# Patient Record
Sex: FEMALE | Race: WHITE | Employment: OTHER | URBAN - NONMETROPOLITAN AREA
[De-identification: names, ages, dates, MRNs, and addresses within clinical notes are randomized per-mention and may not be internally consistent; named-entity substitution may affect disease eponyms.]

---

## 2000-01-01 LAB
BDY SITE: NORMAL
COLLECTED BY:: NORMAL
SAO2 % BLD: 96 % (ref 94–97)

## 2019-09-04 ENCOUNTER — HOSPITAL ENCOUNTER (OUTPATIENT)
Dept: CT IMAGING | Age: 64
Discharge: HOME | End: 2019-09-04

## 2019-09-04 DIAGNOSIS — Z00.6 ENCOUNTER FOR EXAMINATION FOR NORMAL COMPARISON AND CONTROL IN CLINICAL RESEARCH PROGRAM: ICD-10-CM

## 2019-09-04 PROCEDURE — 3209999900 CT COMPARISON OF OUTSIDE FILMS

## 2019-09-09 ENCOUNTER — OFFICE VISIT (OUTPATIENT)
Dept: ONCOLOGY | Age: 64
End: 2019-09-09
Payer: MEDICARE

## 2019-09-09 ENCOUNTER — HOSPITAL ENCOUNTER (OUTPATIENT)
Dept: INFUSION THERAPY | Age: 64
Discharge: HOME | End: 2019-09-09
Payer: MEDICARE

## 2019-09-09 VITALS
BODY MASS INDEX: 31.76 KG/M2 | HEART RATE: 73 BPM | SYSTOLIC BLOOD PRESSURE: 190 MMHG | RESPIRATION RATE: 18 BRPM | HEIGHT: 66 IN | DIASTOLIC BLOOD PRESSURE: 102 MMHG | WEIGHT: 197.6 LBS | TEMPERATURE: 97.8 F | OXYGEN SATURATION: 100 %

## 2019-09-09 DIAGNOSIS — Z51.11 ENCOUNTER FOR CHEMOTHERAPY MANAGEMENT: ICD-10-CM

## 2019-09-09 DIAGNOSIS — I10 HYPERTENSION, UNSPECIFIED TYPE: ICD-10-CM

## 2019-09-09 DIAGNOSIS — C53.9 MALIGNANT NEOPLASM OF CERVIX, UNSPECIFIED SITE (HCC): ICD-10-CM

## 2019-09-09 DIAGNOSIS — C53.9 MALIGNANT NEOPLASM OF CERVIX, UNSPECIFIED SITE (HCC): Primary | ICD-10-CM

## 2019-09-09 LAB
ALBUMIN SERPL-MCNC: 4.5 G/DL (ref 3.5–5.1)
ALP BLD-CCNC: 70 U/L (ref 38–126)
ALT SERPL-CCNC: 15 U/L (ref 11–66)
ANION GAP SERPL CALCULATED.3IONS-SCNC: 18 MEQ/L (ref 8–16)
AST SERPL-CCNC: 16 U/L (ref 5–40)
BASOPHILS # BLD: 0.4 %
BASOPHILS ABSOLUTE: 0 THOU/MM3 (ref 0–0.1)
BILIRUB SERPL-MCNC: 0.4 MG/DL (ref 0.3–1.2)
BILIRUBIN DIRECT: < 0.2 MG/DL (ref 0–0.3)
BUN BLDV-MCNC: 35 MG/DL (ref 7–22)
CALCIUM SERPL-MCNC: 11.3 MG/DL (ref 8.5–10.5)
CHLORIDE BLD-SCNC: 102 MEQ/L (ref 98–111)
CO2: 18 MEQ/L (ref 23–33)
CREAT SERPL-MCNC: 3.8 MG/DL (ref 0.4–1.2)
EOSINOPHIL # BLD: 2.3 %
EOSINOPHILS ABSOLUTE: 0.2 THOU/MM3 (ref 0–0.4)
ERYTHROCYTE [DISTWIDTH] IN BLOOD BY AUTOMATED COUNT: 13.7 % (ref 11.5–14.5)
ERYTHROCYTE [DISTWIDTH] IN BLOOD BY AUTOMATED COUNT: 44.5 FL (ref 35–45)
GFR SERPL CREATININE-BSD FRML MDRD: 12 ML/MIN/1.73M2
GLUCOSE BLD-MCNC: 107 MG/DL (ref 70–108)
HCT VFR BLD CALC: 38.2 % (ref 37–47)
HEMOGLOBIN: 12.4 GM/DL (ref 12–16)
IMMATURE GRANS (ABS): 0.06 THOU/MM3 (ref 0–0.07)
IMMATURE GRANULOCYTES: 1 %
LYMPHOCYTES # BLD: 17.3 %
LYMPHOCYTES ABSOLUTE: 1.9 THOU/MM3 (ref 1–4.8)
MCH RBC QN AUTO: 28.6 PG (ref 26–33)
MCHC RBC AUTO-ENTMCNC: 32.5 GM/DL (ref 32.2–35.5)
MCV RBC AUTO: 88 FL (ref 81–99)
MONOCYTES # BLD: 8.4 %
MONOCYTES ABSOLUTE: 0.9 THOU/MM3 (ref 0.4–1.3)
NUCLEATED RED BLOOD CELLS: 0 /100 WBC
PLATELET # BLD: 281 THOU/MM3 (ref 130–400)
PMV BLD AUTO: 10 FL (ref 9.4–12.4)
POTASSIUM SERPL-SCNC: 3.8 MEQ/L (ref 3.5–5.2)
RBC # BLD: 4.34 MILL/MM3 (ref 4.2–5.4)
SEG NEUTROPHILS: 71 %
SEGMENTED NEUTROPHILS ABSOLUTE COUNT: 7.7 THOU/MM3 (ref 1.8–7.7)
SODIUM BLD-SCNC: 138 MEQ/L (ref 135–145)
TOTAL PROTEIN: 7.6 G/DL (ref 6.1–8)
WBC # BLD: 10.8 THOU/MM3 (ref 4.8–10.8)

## 2019-09-09 PROCEDURE — 99211 OFF/OP EST MAY X REQ PHY/QHP: CPT

## 2019-09-09 PROCEDURE — 80053 COMPREHEN METABOLIC PANEL: CPT

## 2019-09-09 PROCEDURE — G8427 DOCREV CUR MEDS BY ELIG CLIN: HCPCS | Performed by: INTERNAL MEDICINE

## 2019-09-09 PROCEDURE — 1123F ACP DISCUSS/DSCN MKR DOCD: CPT | Performed by: INTERNAL MEDICINE

## 2019-09-09 PROCEDURE — G8400 PT W/DXA NO RESULTS DOC: HCPCS | Performed by: INTERNAL MEDICINE

## 2019-09-09 PROCEDURE — G8417 CALC BMI ABV UP PARAM F/U: HCPCS | Performed by: INTERNAL MEDICINE

## 2019-09-09 PROCEDURE — 85025 COMPLETE CBC W/AUTO DIFF WBC: CPT

## 2019-09-09 PROCEDURE — 3017F COLORECTAL CA SCREEN DOC REV: CPT | Performed by: INTERNAL MEDICINE

## 2019-09-09 PROCEDURE — 82248 BILIRUBIN DIRECT: CPT

## 2019-09-09 PROCEDURE — 1036F TOBACCO NON-USER: CPT | Performed by: INTERNAL MEDICINE

## 2019-09-09 PROCEDURE — 1090F PRES/ABSN URINE INCON ASSESS: CPT | Performed by: INTERNAL MEDICINE

## 2019-09-09 PROCEDURE — 36415 COLL VENOUS BLD VENIPUNCTURE: CPT

## 2019-09-09 PROCEDURE — 4040F PNEUMOC VAC/ADMIN/RCVD: CPT | Performed by: INTERNAL MEDICINE

## 2019-09-09 PROCEDURE — 99205 OFFICE O/P NEW HI 60 MIN: CPT | Performed by: INTERNAL MEDICINE

## 2019-09-09 RX ORDER — MEDROXYPROGESTERONE ACETATE 10 MG/1
20 TABLET ORAL
COMMUNITY
Start: 2019-08-15 | End: 2020-06-24 | Stop reason: ALTCHOICE

## 2019-09-09 RX ORDER — TEMAZEPAM 30 MG/1
CAPSULE ORAL
Refills: 2 | COMMUNITY
Start: 2019-08-20

## 2019-09-09 RX ORDER — ACETAMINOPHEN 325 MG/1
TABLET ORAL
COMMUNITY
Start: 2019-08-13 | End: 2022-06-14

## 2019-09-09 RX ORDER — NIFEDIPINE 60 MG/1
60 TABLET, FILM COATED, EXTENDED RELEASE ORAL 2 TIMES DAILY
Refills: 3 | COMMUNITY
Start: 2019-09-03

## 2019-09-09 RX ORDER — LANOLIN ALCOHOL/MO/W.PET/CERES
CREAM (GRAM) TOPICAL
Refills: 3 | COMMUNITY
Start: 2019-09-03 | End: 2020-06-24 | Stop reason: ALTCHOICE

## 2019-09-09 RX ORDER — SERTRALINE HYDROCHLORIDE 25 MG/1
25 TABLET, FILM COATED ORAL
COMMUNITY
Start: 2019-09-03 | End: 2020-06-17 | Stop reason: ALTCHOICE

## 2019-09-09 RX ORDER — LEVOTHYROXINE SODIUM 0.12 MG/1
125 TABLET ORAL DAILY
COMMUNITY
Start: 2019-08-16

## 2019-09-09 RX ORDER — TRAMADOL HYDROCHLORIDE 50 MG/1
50-100 TABLET ORAL
COMMUNITY
Start: 2019-08-16 | End: 2020-06-17 | Stop reason: ALTCHOICE

## 2019-09-09 NOTE — PROGRESS NOTES
recommendation of combined chemotherapy and radiation therapy treatment. The patient would like to receive treatment closer to home and therefore presents to the medical oncology clinic at the [facility] for further evaluation. She has previously met with radiation oncology here at the [facility]. The plan for the patient is to proceed with external beam radiation therapy followed by intracavitary radiotherapy treatment. She will receive concurrent cisplatin-based chemotherapy treatment during her radiation therapy treatments. She is here to establish medical oncology care and treatment with chemotherapy for her cervical cancer. Of note, the patient has a history of severe hypertension. She is undergoing management and therapy by Dr. Sarita Jones nephrologist at [facility].    Past Medical History  She has a past medical history of Anxiety, Diarrhea, Dryness of periwound skin, Gout, joint, Hypertension, Kidney disease, and Mouth dryness. Surgical History  She reports her surgical history as the gynecological surgery as noted in the history of present illness. Home Medications  She has a current medication list which includes the following prescription(s): acetaminophen, medroxyprogesterone, levothyroxine, sertraline, tramadol, ferrous sulfate, nifedipine, and temazepam.    Allergies  No Known Allergies    Social History  She reports that she has never smoked. She has never used smokeless tobacco. She reports that she drank alcohol. She reports that she does not use drugs. Family History  Her family history includes Cancer in her father; No Known Problems in her brother and sister. Review of Systems  Constitutional: Negative. HENT: Dry mouth. Eyes: Negative. Respiratory: Negative. Cardiovascular: Negative. Gastrointestinal: Diarrhea. Genitourinary: Pelvic pain, vaginal bleeding. Musculoskeletal: Negative. Skin: Negative.

## 2020-06-17 ENCOUNTER — OFFICE VISIT (OUTPATIENT)
Dept: ONCOLOGY | Age: 65
End: 2020-06-17
Payer: MEDICARE

## 2020-06-17 ENCOUNTER — HOSPITAL ENCOUNTER (OUTPATIENT)
Dept: INFUSION THERAPY | Age: 65
Discharge: HOME | End: 2020-06-17
Payer: MEDICARE

## 2020-06-17 VITALS
DIASTOLIC BLOOD PRESSURE: 71 MMHG | RESPIRATION RATE: 18 BRPM | SYSTOLIC BLOOD PRESSURE: 159 MMHG | TEMPERATURE: 99.5 F | HEIGHT: 66 IN | WEIGHT: 169.6 LBS | HEART RATE: 60 BPM | OXYGEN SATURATION: 100 % | BODY MASS INDEX: 27.26 KG/M2

## 2020-06-17 DIAGNOSIS — D64.9 CHRONIC ANEMIA: ICD-10-CM

## 2020-06-17 PROBLEM — N18.4 CHRONIC RENAL INSUFFICIENCY, STAGE 4 (SEVERE) (HCC): Status: ACTIVE | Noted: 2020-06-17

## 2020-06-17 LAB
ABSOLUTE IMMATURE GRANULOCYTE: 0.01 THOU/MM3 (ref 0–0.07)
BASINOPHIL, AUTOMATED: 1 % (ref 0–3)
BASOPHILS ABSOLUTE: 0 THOU/MM3 (ref 0–0.1)
EOSINOPHILS ABSOLUTE: 0.2 THOU/MM3 (ref 0–0.4)
EOSINOPHILS RELATIVE PERCENT: 2 % (ref 0–4)
HCT VFR BLD CALC: 23.3 % (ref 37–47)
HEMOGLOBIN: 7.6 GM/DL (ref 12–16)
IMMATURE GRANULOCYTES: 0 %
LYMPHOCYTES # BLD: 11 % (ref 15–47)
LYMPHOCYTES ABSOLUTE: 0.7 THOU/MM3 (ref 1–4.8)
MCH RBC QN AUTO: 28.9 PG (ref 26–33)
MCHC RBC AUTO-ENTMCNC: 32.6 GM/DL (ref 32.2–35.5)
MCV RBC AUTO: 89 FL (ref 81–99)
MONOCYTES ABSOLUTE: 0.5 THOU/MM3 (ref 0.4–1.3)
MONOCYTES: 7 % (ref 0–12)
PDW BLD-RTO: 13.8 % (ref 11.5–14.5)
PLATELET # BLD: 301 THOU/MM3 (ref 130–400)
PMV BLD AUTO: 9.2 FL (ref 9.4–12.4)
RBC # BLD: 2.63 MILL/MM3 (ref 4.2–5.4)
SEG NEUTROPHILS: 79 % (ref 43–75)
SEGMENTED NEUTROPHILS ABSOLUTE COUNT: 5.2 THOU/MM3 (ref 1.8–7.7)
WBC # BLD: 6.6 THOU/MM3 (ref 4.8–10.8)

## 2020-06-17 PROCEDURE — 3017F COLORECTAL CA SCREEN DOC REV: CPT | Performed by: INTERNAL MEDICINE

## 2020-06-17 PROCEDURE — 85025 COMPLETE CBC W/AUTO DIFF WBC: CPT

## 2020-06-17 PROCEDURE — 4040F PNEUMOC VAC/ADMIN/RCVD: CPT | Performed by: INTERNAL MEDICINE

## 2020-06-17 PROCEDURE — 99214 OFFICE O/P EST MOD 30 MIN: CPT | Performed by: INTERNAL MEDICINE

## 2020-06-17 PROCEDURE — 36415 COLL VENOUS BLD VENIPUNCTURE: CPT

## 2020-06-17 PROCEDURE — 82668 ASSAY OF ERYTHROPOIETIN: CPT

## 2020-06-17 PROCEDURE — G8427 DOCREV CUR MEDS BY ELIG CLIN: HCPCS | Performed by: INTERNAL MEDICINE

## 2020-06-17 PROCEDURE — 1123F ACP DISCUSS/DSCN MKR DOCD: CPT | Performed by: INTERNAL MEDICINE

## 2020-06-17 PROCEDURE — G8417 CALC BMI ABV UP PARAM F/U: HCPCS | Performed by: INTERNAL MEDICINE

## 2020-06-17 PROCEDURE — G8400 PT W/DXA NO RESULTS DOC: HCPCS | Performed by: INTERNAL MEDICINE

## 2020-06-17 PROCEDURE — 1036F TOBACCO NON-USER: CPT | Performed by: INTERNAL MEDICINE

## 2020-06-17 PROCEDURE — 99211 OFF/OP EST MAY X REQ PHY/QHP: CPT

## 2020-06-17 PROCEDURE — 1090F PRES/ABSN URINE INCON ASSESS: CPT | Performed by: INTERNAL MEDICINE

## 2020-06-17 PROCEDURE — 82728 ASSAY OF FERRITIN: CPT

## 2020-06-17 RX ORDER — DOCUSATE SODIUM 100 MG/1
100 CAPSULE, LIQUID FILLED ORAL
COMMUNITY
Start: 2019-08-23 | End: 2021-09-14 | Stop reason: ALTCHOICE

## 2020-06-17 RX ORDER — LABETALOL 200 MG/1
300 TABLET, FILM COATED ORAL 3 TIMES DAILY
COMMUNITY
End: 2021-01-04 | Stop reason: ALTCHOICE

## 2020-06-17 RX ORDER — SPIRONOLACTONE 25 MG/1
12.5 TABLET ORAL DAILY
COMMUNITY
End: 2021-01-04 | Stop reason: ALTCHOICE

## 2020-06-17 NOTE — PROGRESS NOTES
initial plan was to proceed with combined radiation and chemotherapy treatment however the patient was found to have significant renal insufficiency and chemotherapy was aborted. The patient is not a candidate for consideration of cisplatin based chemotherapy. She returns to the clinic today for follow-up regarding her history of anemia. Has had chronic anemia and has received intravenous iron therapy as treatment of her anemia. The patient reports that she has not received any form of Procrit type of therapy. Given her extent of renal insufficiency she likely has erythropoietin deficiency. Her erythropoietin level will be checked today. If her erythropoietin level is low then we would likely proceed with therapy with Procrit. The potential benefits as well as the Penta side effects and toxicity of Procrit therapy were reviewed with the patient today. The patient complains of generalized weakness and fatigue. She does not have evidence of blood loss. Her bowel and bladder habits have been fairly stable. Her ECOG performance status is level 1. PMH, SH, and FH:  I reviewed the patients medication list and allergy list as noted on the electronic medical record. The PMH, SH and FH were also reviewed as noted on the EMR. Review of Systems  Constitutional: Fatigue. HENT: Negative. Eyes: Negative. Respiratory: Negative. Cardiovascular: Negative. Gastrointestinal: Negative. Genitourinary: Negative. Musculoskeletal: Negative. Skin: Negative. Neurological: General weakness. Hematological: Negative. Psychiatric/Behavioral: Negative. Objective:   Physical Exam  Vitals:    06/17/20 1351   BP: (!) 159/71   Site: Left Upper Arm   Position: Sitting   Cuff Size: Medium Adult   Pulse: 60   Resp: 18   Temp: 99.5 °F (37.5 °C)   TempSrc: Oral   SpO2: 100%   Weight: 169 lb 9.6 oz (76.9 kg)   Height: 5' 6\" (1.676 m)   Vitals reviewed and are stable. Constitutional: Elderly, frail.  No

## 2020-06-18 LAB — FERRITIN: 102 NG/ML (ref 10–291)

## 2020-06-19 LAB — ERYTHROPOIETIN: 17 MU/ML (ref 4–27)

## 2020-06-24 ENCOUNTER — HOSPITAL ENCOUNTER (OUTPATIENT)
Dept: INFUSION THERAPY | Age: 65
Discharge: HOME | End: 2020-06-24
Payer: MEDICARE

## 2020-06-24 VITALS
BODY MASS INDEX: 27.28 KG/M2 | SYSTOLIC BLOOD PRESSURE: 140 MMHG | OXYGEN SATURATION: 99 % | HEART RATE: 46 BPM | WEIGHT: 169 LBS | DIASTOLIC BLOOD PRESSURE: 61 MMHG | RESPIRATION RATE: 18 BRPM | TEMPERATURE: 97.6 F

## 2020-06-24 DIAGNOSIS — N18.4 CHRONIC RENAL INSUFFICIENCY, STAGE 4 (SEVERE) (HCC): ICD-10-CM

## 2020-06-24 DIAGNOSIS — D64.9 CHRONIC ANEMIA: Primary | ICD-10-CM

## 2020-06-24 PROCEDURE — 96372 THER/PROPH/DIAG INJ SC/IM: CPT

## 2020-06-24 PROCEDURE — 6360000002 HC RX W HCPCS: Performed by: INTERNAL MEDICINE

## 2020-06-24 RX ADMIN — EPOETIN ALFA-EPBX 10000 UNITS: 10000 INJECTION, SOLUTION INTRAVENOUS; SUBCUTANEOUS at 12:09

## 2020-06-24 ASSESSMENT — PAIN DESCRIPTION - DESCRIPTORS: DESCRIPTORS: SHARP;CRAMPING;BURNING

## 2020-06-24 ASSESSMENT — PAIN DESCRIPTION - ORIENTATION: ORIENTATION: RIGHT;LEFT

## 2020-06-24 ASSESSMENT — PAIN DESCRIPTION - PROGRESSION: CLINICAL_PROGRESSION: GRADUALLY WORSENING

## 2020-06-24 ASSESSMENT — PAIN DESCRIPTION - PAIN TYPE: TYPE: CHRONIC PAIN

## 2020-06-24 ASSESSMENT — PAIN DESCRIPTION - LOCATION: LOCATION: LEG

## 2020-06-24 ASSESSMENT — PAIN DESCRIPTION - ONSET: ONSET: ON-GOING

## 2020-06-24 ASSESSMENT — PAIN DESCRIPTION - FREQUENCY: FREQUENCY: CONTINUOUS

## 2020-06-24 NOTE — PROGRESS NOTES
Pt discharged in stable condition with verbalization of discharge instructions all questions answered and all  belongings sent with patient. Patient tolerated retacrit injection  without any complications or signs of a reaction.

## 2020-06-24 NOTE — PLAN OF CARE
Problem: Musculor/Skeletal Functional Status  Goal: Absence of falls  Note: No falls this admission   Intervention: Fall precautions  Note: Patient aware of fall precautions for here and at home -call light in reach while here       Problem: Intellectual/Education/Knowledge Deficit  Goal: Teaching initiated upon admission  Note:   Reviewed retacrit with patient, patient offered no questions or concerns. Patient verbalized understanding of drug being administered. Goal: Written Disposition Instruction form completed  Note: Discharge instructions given and reviewed with patient. All questions answered. Patient verbalized understanding   Intervention: Verbal/written education provided  Note: Discharge instruction sheets     Problem: Discharge Planning  Goal: Knowledge of discharge instructions  Description: Knowledge of discharge instructions     Note: Patient  able to teach back follow up appointments and when to call the doctor. Patient offers no questions at this time   Intervention: Interaction with patient/family and care team  Note: All ;questions answered and support given  Intervention: Discharge to appropriate level of care  Note: Discharge home   Care plan reviewed with patient and she verbalized understanding of the plan of care and contributed to goal setting.

## 2020-07-01 ENCOUNTER — HOSPITAL ENCOUNTER (OUTPATIENT)
Dept: INFUSION THERAPY | Age: 65
Discharge: HOME | End: 2020-07-01
Payer: MEDICARE

## 2020-07-01 VITALS
BODY MASS INDEX: 27.16 KG/M2 | HEIGHT: 66 IN | OXYGEN SATURATION: 98 % | SYSTOLIC BLOOD PRESSURE: 155 MMHG | HEART RATE: 53 BPM | RESPIRATION RATE: 16 BRPM | WEIGHT: 169 LBS | TEMPERATURE: 97.7 F | DIASTOLIC BLOOD PRESSURE: 74 MMHG

## 2020-07-01 DIAGNOSIS — D64.9 CHRONIC ANEMIA: Primary | ICD-10-CM

## 2020-07-01 DIAGNOSIS — N18.4 CHRONIC RENAL INSUFFICIENCY, STAGE 4 (SEVERE) (HCC): ICD-10-CM

## 2020-07-01 LAB
HCT VFR BLD CALC: 23.1 % (ref 37–47)
HEMOGLOBIN: 7.4 GM/DL (ref 12–16)
MCH RBC QN AUTO: 28.6 PG (ref 26–33)
MCHC RBC AUTO-ENTMCNC: 32 GM/DL (ref 32.2–35.5)
MCV RBC AUTO: 89 FL (ref 81–99)
PDW BLD-RTO: 14.4 % (ref 11.5–14.5)
PLATELET # BLD: 319 THOU/MM3 (ref 130–400)
PMV BLD AUTO: 9.5 FL (ref 9.4–12.4)
RBC # BLD: 2.59 MILL/MM3 (ref 4.2–5.4)
WBC # BLD: 8 THOU/MM3 (ref 4.8–10.8)

## 2020-07-01 PROCEDURE — 96372 THER/PROPH/DIAG INJ SC/IM: CPT

## 2020-07-01 PROCEDURE — 85027 COMPLETE CBC AUTOMATED: CPT

## 2020-07-01 PROCEDURE — 36415 COLL VENOUS BLD VENIPUNCTURE: CPT

## 2020-07-01 PROCEDURE — 6360000002 HC RX W HCPCS: Performed by: INTERNAL MEDICINE

## 2020-07-01 RX ORDER — ASPIRIN 81 MG/1
81 TABLET ORAL DAILY
COMMUNITY

## 2020-07-01 RX ADMIN — EPOETIN ALFA-EPBX 10000 UNITS: 10000 INJECTION, SOLUTION INTRAVENOUS; SUBCUTANEOUS at 11:50

## 2020-07-01 NOTE — PLAN OF CARE
Care plan reviewed with patient. Patient  verbalized understanding of the plan of care and contribute to goal setting. Problem: Intellectual/Education/Knowledge Deficit  Goal: Teaching initiated upon admission  Outcome: Met This Shift  Note: Patient verbalizes understanding to verbal information given on retacrit,action and possible side effects. Aware to call MD if develop complications. Intervention: Verbal/written education provided  Note: Discuss Retacrit     Problem: Discharge Planning  Goal: Knowledge of discharge instructions  Description: Knowledge of discharge instructions     Outcome: Met This Shift  Note: Verbalized understanding of discharge instructions, follow ups and when to call doctor      Problem: Discharge Planning  Goal: Knowledge of discharge instructions  Description: Knowledge of discharge instructions     Outcome: Met This Shift  Note: Verbalized understanding of discharge instructions, follow ups and when to call doctor      Problem: Discharge Planning  Goal: Knowledge of discharge instructions  Description: Knowledge of discharge instructions     Outcome: Met This Shift  Note: Verbalized understanding of discharge instructions, follow ups and when to call doctor   Intervention: Discharge to appropriate level of care  Note: Discuss discharge instructions, follow ups and when to call doctor. Problem: Falls - Risk of:  Goal: Will remain free from falls  Description: Will remain free from falls  Outcome: Met This Shift  Note: Free from falls while in infusion center.  Verbalized understanding of fall prevention and to ask for assistance with ambulation   Intervention: Assess risk factors for falls  Description: Assess risk factors for falls  Note: Assess for fall risk, instruct to ask for assistance with ambulation

## 2020-07-01 NOTE — PROGRESS NOTES
Pt tolerated retacrit injection without any complications. Discharge instructions given to patient. Patient verbalizes understanding. Pt ambulated off unit per self with belongings.

## 2020-07-08 ENCOUNTER — HOSPITAL ENCOUNTER (OUTPATIENT)
Dept: INFUSION THERAPY | Age: 65
Discharge: HOME | End: 2020-07-08
Payer: MEDICARE

## 2020-07-08 ENCOUNTER — TELEPHONE (OUTPATIENT)
Dept: ONCOLOGY | Age: 65
End: 2020-07-08

## 2020-07-08 VITALS
BODY MASS INDEX: 27.64 KG/M2 | SYSTOLIC BLOOD PRESSURE: 158 MMHG | HEART RATE: 58 BPM | RESPIRATION RATE: 16 BRPM | OXYGEN SATURATION: 99 % | TEMPERATURE: 98.4 F | WEIGHT: 172 LBS | DIASTOLIC BLOOD PRESSURE: 73 MMHG | HEIGHT: 66 IN

## 2020-07-08 DIAGNOSIS — N18.4 CHRONIC RENAL INSUFFICIENCY, STAGE 4 (SEVERE) (HCC): ICD-10-CM

## 2020-07-08 DIAGNOSIS — D64.9 CHRONIC ANEMIA: Primary | ICD-10-CM

## 2020-07-08 LAB
ALBUMIN SERPL-MCNC: 4.1 G/DL (ref 3.5–5.1)
ALP BLD-CCNC: 62 U/L (ref 38–126)
ALT SERPL-CCNC: 11 U/L (ref 11–66)
ANION GAP SERPL CALCULATED.3IONS-SCNC: 16 MEQ/L (ref 8–16)
AST SERPL-CCNC: 14 U/L (ref 5–40)
BILIRUB SERPL-MCNC: 0.2 MG/DL (ref 0.3–1.2)
BUN BLDV-MCNC: 61 MG/DL (ref 7–22)
CALCIUM SERPL-MCNC: 10.5 MG/DL (ref 8.5–10.5)
CHLORIDE BLD-SCNC: 102 MEQ/L (ref 98–111)
CO2: 18 MEQ/L (ref 23–33)
CREAT SERPL-MCNC: 5.1 MG/DL (ref 0.4–1.2)
GFR SERPL CREATININE-BSD FRML MDRD: 8 ML/MIN/1.73M2
GLUCOSE BLD-MCNC: 87 MG/DL (ref 70–108)
HCT VFR BLD CALC: 23.1 % (ref 37–47)
HEMOGLOBIN: 7.3 GM/DL (ref 12–16)
MAGNESIUM: 2.6 MG/DL (ref 1.6–2.4)
MCH RBC QN AUTO: 28.2 PG (ref 26–33)
MCHC RBC AUTO-ENTMCNC: 31.6 GM/DL (ref 32.2–35.5)
MCV RBC AUTO: 89 FL (ref 81–99)
PDW BLD-RTO: 15 % (ref 11.5–14.5)
PHOSPHORUS: 8.1 MG/DL (ref 2.4–4.7)
PLATELET # BLD: 265 THOU/MM3 (ref 130–400)
PMV BLD AUTO: 9.5 FL (ref 9.4–12.4)
POTASSIUM SERPL-SCNC: 4.4 MEQ/L (ref 3.5–5.2)
PTH INTACT: 184.2 PG/ML (ref 15–65)
RBC # BLD: 2.59 MILL/MM3 (ref 4.2–5.4)
SODIUM BLD-SCNC: 136 MEQ/L (ref 135–145)
TOTAL PROTEIN: 6.1 G/DL (ref 6.1–8)
WBC # BLD: 6.7 THOU/MM3 (ref 4.8–10.8)

## 2020-07-08 PROCEDURE — 85027 COMPLETE CBC AUTOMATED: CPT

## 2020-07-08 PROCEDURE — 84100 ASSAY OF PHOSPHORUS: CPT

## 2020-07-08 PROCEDURE — 36415 COLL VENOUS BLD VENIPUNCTURE: CPT

## 2020-07-08 PROCEDURE — 83970 ASSAY OF PARATHORMONE: CPT

## 2020-07-08 PROCEDURE — 82652 VIT D 1 25-DIHYDROXY: CPT

## 2020-07-08 PROCEDURE — 6360000002 HC RX W HCPCS: Performed by: INTERNAL MEDICINE

## 2020-07-08 PROCEDURE — 80053 COMPREHEN METABOLIC PANEL: CPT

## 2020-07-08 PROCEDURE — 83735 ASSAY OF MAGNESIUM: CPT

## 2020-07-08 PROCEDURE — 96372 THER/PROPH/DIAG INJ SC/IM: CPT

## 2020-07-08 PROCEDURE — 99211 OFF/OP EST MAY X REQ PHY/QHP: CPT

## 2020-07-08 RX ORDER — CYCLOBENZAPRINE HCL 10 MG
10 TABLET ORAL 2 TIMES DAILY PRN
Qty: 60 TABLET | Refills: 0 | Status: SHIPPED | OUTPATIENT
Start: 2020-07-08 | End: 2020-08-07

## 2020-07-08 RX ADMIN — EPOETIN ALFA-EPBX 10000 UNITS: 10000 INJECTION, SOLUTION INTRAVENOUS; SUBCUTANEOUS at 11:55

## 2020-07-08 ASSESSMENT — PAIN DESCRIPTION - PROGRESSION: CLINICAL_PROGRESSION: GRADUALLY WORSENING

## 2020-07-08 ASSESSMENT — PAIN DESCRIPTION - FREQUENCY: FREQUENCY: CONTINUOUS

## 2020-07-08 ASSESSMENT — PAIN DESCRIPTION - DESCRIPTORS: DESCRIPTORS: ACHING;CRAMPING;SHARP

## 2020-07-08 ASSESSMENT — PAIN DESCRIPTION - ORIENTATION: ORIENTATION: RIGHT;LEFT

## 2020-07-08 ASSESSMENT — PAIN DESCRIPTION - ONSET: ONSET: ON-GOING

## 2020-07-08 ASSESSMENT — PAIN DESCRIPTION - LOCATION: LOCATION: LEG

## 2020-07-08 ASSESSMENT — PAIN DESCRIPTION - PAIN TYPE: TYPE: CHRONIC PAIN

## 2020-07-08 NOTE — PROGRESS NOTES
Patient tolerated retacrit without any complications. Patient verbalizes understanding of discharge instructions, ambulated off unit per self with belongings.

## 2020-07-08 NOTE — PLAN OF CARE
Problem: Pain:  Goal: Control of chronic pain  Description: Control of chronic pain  Outcome: Met This Shift  Note: Patient verbalizes understanding to verbal information given on home pain medication,action and possible side effects. Aware to call MD if develop complications. Problem: Pain:  Intervention: Assess barriers to pain control  Note: Discussed home pain medication and when to call the doctor. Problem: Musculor/Skeletal Functional Status  Goal: Absence of falls  Outcome: Met This Shift  Note: Patient verbalizes understanding of fall precautions. Patient free from falls this visit. Intervention: Fall precautions  Note: Discussed fall precautions, call light within reach. Problem: Intellectual/Education/Knowledge Deficit  Goal: Teaching initiated upon admission  Outcome: Met This Shift  Note: Patient verbalizes understanding to verbal information given on retacrit,action and possible side effects. Aware to call MD if develop complications. Intervention: Verbal/written education provided  Note: Discussed retacrit action, possible side effects and when to call the doctor. Problem: Discharge Planning  Goal: Knowledge of discharge instructions  Description: Knowledge of discharge instructions     Outcome: Met This Shift  Note: Verbalized understanding of discharge instructions, follow-up appointments, and when to call the physician. Intervention: Discharge to appropriate level of care  Note: Discuss discharge instructions, follow ups, and when to call the doctor. Care plan reviewed with patient. Patient verbalizes understanding of the plan of care and contribute to goal setting.

## 2020-07-08 NOTE — TELEPHONE ENCOUNTER
Patient said you were going to call a script in for a muscle relaxor and the pharmacy does not have anything.     Please advise

## 2020-07-10 ENCOUNTER — TELEPHONE (OUTPATIENT)
Dept: ONCOLOGY | Age: 65
End: 2020-07-10

## 2020-07-10 NOTE — TELEPHONE ENCOUNTER
She should not increase dose of muscle relaxant. Patient should see he PCP. This condition is not likely related to her anemia. May need further testing that her PCP can order.

## 2020-07-10 NOTE — TELEPHONE ENCOUNTER
Patient is still having cramping she wants to know if she should double up on the med you sent this week or try something else?     Please advise

## 2020-07-11 LAB — VITAMIN D 1,25-DIHYDROXY: 8.8 PG/ML (ref 19.9–79.3)

## 2020-07-15 ENCOUNTER — OFFICE VISIT (OUTPATIENT)
Dept: ONCOLOGY | Age: 65
End: 2020-07-15
Payer: MEDICARE

## 2020-07-15 ENCOUNTER — HOSPITAL ENCOUNTER (OUTPATIENT)
Dept: INFUSION THERAPY | Age: 65
Discharge: HOME | End: 2020-07-15
Payer: MEDICARE

## 2020-07-15 VITALS
TEMPERATURE: 98.9 F | HEART RATE: 56 BPM | RESPIRATION RATE: 18 BRPM | WEIGHT: 171 LBS | SYSTOLIC BLOOD PRESSURE: 159 MMHG | HEIGHT: 66 IN | DIASTOLIC BLOOD PRESSURE: 69 MMHG | OXYGEN SATURATION: 99 % | BODY MASS INDEX: 27.48 KG/M2

## 2020-07-15 VITALS
SYSTOLIC BLOOD PRESSURE: 159 MMHG | BODY MASS INDEX: 27.48 KG/M2 | DIASTOLIC BLOOD PRESSURE: 69 MMHG | HEART RATE: 56 BPM | RESPIRATION RATE: 18 BRPM | HEIGHT: 66 IN | WEIGHT: 171 LBS | OXYGEN SATURATION: 99 % | TEMPERATURE: 98.9 F

## 2020-07-15 DIAGNOSIS — N18.4 CHRONIC RENAL INSUFFICIENCY, STAGE 4 (SEVERE) (HCC): ICD-10-CM

## 2020-07-15 DIAGNOSIS — D64.9 CHRONIC ANEMIA: Primary | ICD-10-CM

## 2020-07-15 LAB
HCT VFR BLD CALC: 24.8 % (ref 37–47)
HEMOGLOBIN: 7.9 GM/DL (ref 12–16)
MCH RBC QN AUTO: 28.5 PG (ref 26–33)
MCHC RBC AUTO-ENTMCNC: 31.9 GM/DL (ref 32.2–35.5)
MCV RBC AUTO: 90 FL (ref 81–99)
PDW BLD-RTO: 15.1 % (ref 11.5–14.5)
PLATELET # BLD: 273 THOU/MM3 (ref 130–400)
PMV BLD AUTO: 9 FL (ref 9.4–12.4)
RBC # BLD: 2.77 MILL/MM3 (ref 4.2–5.4)
WBC # BLD: 5.3 THOU/MM3 (ref 4.8–10.8)

## 2020-07-15 PROCEDURE — 6360000002 HC RX W HCPCS: Performed by: INTERNAL MEDICINE

## 2020-07-15 PROCEDURE — 1036F TOBACCO NON-USER: CPT | Performed by: INTERNAL MEDICINE

## 2020-07-15 PROCEDURE — 1090F PRES/ABSN URINE INCON ASSESS: CPT | Performed by: INTERNAL MEDICINE

## 2020-07-15 PROCEDURE — 96372 THER/PROPH/DIAG INJ SC/IM: CPT

## 2020-07-15 PROCEDURE — 3017F COLORECTAL CA SCREEN DOC REV: CPT | Performed by: INTERNAL MEDICINE

## 2020-07-15 PROCEDURE — 4040F PNEUMOC VAC/ADMIN/RCVD: CPT | Performed by: INTERNAL MEDICINE

## 2020-07-15 PROCEDURE — G8427 DOCREV CUR MEDS BY ELIG CLIN: HCPCS | Performed by: INTERNAL MEDICINE

## 2020-07-15 PROCEDURE — G8400 PT W/DXA NO RESULTS DOC: HCPCS | Performed by: INTERNAL MEDICINE

## 2020-07-15 PROCEDURE — 1123F ACP DISCUSS/DSCN MKR DOCD: CPT | Performed by: INTERNAL MEDICINE

## 2020-07-15 PROCEDURE — 99211 OFF/OP EST MAY X REQ PHY/QHP: CPT

## 2020-07-15 PROCEDURE — 85027 COMPLETE CBC AUTOMATED: CPT

## 2020-07-15 PROCEDURE — G8417 CALC BMI ABV UP PARAM F/U: HCPCS | Performed by: INTERNAL MEDICINE

## 2020-07-15 PROCEDURE — 99215 OFFICE O/P EST HI 40 MIN: CPT | Performed by: INTERNAL MEDICINE

## 2020-07-15 PROCEDURE — 36415 COLL VENOUS BLD VENIPUNCTURE: CPT

## 2020-07-15 RX ADMIN — EPOETIN ALFA-EPBX 10000 UNITS: 10000 INJECTION, SOLUTION INTRAVENOUS; SUBCUTANEOUS at 14:43

## 2020-07-15 NOTE — PROGRESS NOTES
[facility]  CANCER NETWORK OF [facility]  [facility] [address] [facility]  [address]  [address]  Dept: [phone]  Dept Fax: [phone]  Loc: [phone]    Subjective:      Chief Complaint:  Curtis Mchugh is a 71 y.o. female with cervical cancer. In June 2019, the patient began to develop a vague lower abdominal pain. This is associated sign and symptom progressively became worse to the point that it was severe. In July 2019 the patient developed vaginal bleeding which she describes also severe. This prompted her to present to the emergency department at [facility] in [place]. A pelvic ultrasound was completed which found fullness of the lower uterine segment. A follow-up CT scan of the abdomen pelvis did not show any area of lymphadenopathy or other intra-abdominal or pelvic findings. Eventually an MRI scan of the pelvis was completed which displayed a 5 cm cervical neoplasm with invasion of the parametrial and upper vagina region. There was noted obstruction of the endocervical canal with dilatation of the endometrial cavity. In context to this condition, the patient was then referred to [facility] gynecological oncology for further evaluation. Modifying factor affecting this condition was that on October 26, 2019 the patient underwent biopsy of the exocervix, endocervix, and endometrium. Surgical pathology confirmed squamous cell carcinoma. A follow-up PET scan then was completed which found intense abnormal metabolic activity associated with the primary malignancy but no evidence of metastatic spread of malignancy. 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 HPI:   Mario Brown is here today for follow-up regarding her history of anemia. She also has a history of cervical cancer.   The patient is on therapy with reticulocyte crit which she is tolerating well. She has no side effects or toxicity related to this treatment. Her hemoglobin has modestly improved. The patient also has had a modest improvement in her clinical condition. She has less fatigue and has more energy. The patient has not had fever, cough, shortness of breath or other signs of infection. Her bowel and bladder habits have been stable. She does not have evidence of blood loss. She has not seen blood in her stool or urine. The patient continues to follow-up with her nephrologist regarding her chronic kidney disease. Her ECOG performance status remains level 1. PMH, SH, and FH:  I reviewed the patients medication list and allergy list as noted on the electronic medical record. The PMH, SH and FH were also reviewed as noted on the EMR. Review of Systems  Constitutional: Fatigue. HENT: Negative. Eyes: Negative. Respiratory: Negative. Cardiovascular: Negative. Gastrointestinal: Negative. Genitourinary: Negative. Musculoskeletal: Negative. Skin: Negative. Neurological: General weakness. Hematological: Negative. Psychiatric/Behavioral: Negative. Objective:   Physical Exam  Vitals:    07/15/20 1351   BP: (!) 159/69   Site: Left Upper Arm   Position: Sitting   Cuff Size: Medium Adult   Pulse: 56   Resp: 18   Temp: 98.9 °F (37.2 °C)   TempSrc: Oral   SpO2: 99%   Weight: 171 lb (77.6 kg)   Height: 5' 6\" (1.676 m)   Vitals reviewed and are stable. Constitutional: Well-developed and well-nourished. No acute distress. HENT: Normocephalic and atraumatic. Eyes: Pupils are equal and reactive. No scleral icterus. Neck: Overall appearance is symmetrical. No identifiable masses. Chest: Inspection and palpation of chest is normal.  Pulmonary: Effort normal. No respiratory distress. Cardiovascular: RRR. No edema in any of the four extremities. Abdominal: Soft. No hepatomegaly or splenomegaly.    Musculoskeletal: Gait is normal. Muscle strength and tone good. Neurological: Alert and oriented to person, place, and time. Judgment and thought content normal.  Skin: Skin is warm and dry. No rash. Psychiatric: Mood and affect appropriate for the clinical situation. Behavior is normal.      Data Analysis: The following studies were reviewed with the patient today:    Hematology 7/29/2020 7/22/2020 7/15/2020   WBC 8.1 7.9 5.3   RBC 3.06 (L) 2.96 (L) 2.77 (L)   HGB 8.8 (L) 8.4 (L) 7.9 (L)   HCT 27.6 (L) 26.9 (L) 24.8 (L)   MCV 90 91 90   RDW 15.3 (H) 15.7 (H) 15.1 (H)    302 273   Ferritin        Assessment:   1. 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 Hypertension. 3.  Renal failure. 4.  Cervical cancer. Plan:   1. Continue weekly Procrit therapy. 2.  Continue weekly CBC. 3.  Monitor hemoglobin/hematocrit and for any signs of blood loss. 4.  Monitor for side effects and toxicity from Procrit. 5.  Monitor for recurrence of malignancy. Angélica Carroll M.D. Medical Director: [facility]  Cancer Network [address] [facility], [address], [address] [place] [address], [address] [place] [address] of the [facility] at the [facility]      **This report has been created using voice recognition software. It may contain minor errors which are inherent in voice recognition technology. **

## 2020-07-15 NOTE — PLAN OF CARE
Problem: Musculor/Skeletal Functional Status  Goal: Absence of falls  Outcome: Met This Shift  Note: Patient verbalizes understanding of fall precautions. Patient free from falls this visit. Intervention: Fall precautions  Note: Discussed fall precautions, call light within reach. Problem: Intellectual/Education/Knowledge Deficit  Goal: Teaching initiated upon admission  Outcome: Met This Shift  Note: Patient verbalizes understanding to verbal information given on retacrit,action and possible side effects. Aware to call MD if develop complications. Intervention: Verbal/written education provided  Note: Discussed retacrit action, possible side effects and when to call the doctor. Problem: Discharge Planning  Goal: Knowledge of discharge instructions  Description: Knowledge of discharge instructions  Outcome: Met This Shift  Note: Verbalized understanding of discharge instructions, follow-up appointments, and when to call the physician. Intervention: Discharge to appropriate level of care  Note: Discuss discharge instructions, follow ups, and when to call the doctor. Care plan reviewed with patient. Patient verbalizes understanding of the plan of care and contribute to goal setting.

## 2020-07-22 ENCOUNTER — HOSPITAL ENCOUNTER (OUTPATIENT)
Dept: INFUSION THERAPY | Age: 65
Discharge: HOME | End: 2020-07-22
Payer: MEDICARE

## 2020-07-22 VITALS
OXYGEN SATURATION: 99 % | TEMPERATURE: 97.3 F | WEIGHT: 169.4 LBS | BODY MASS INDEX: 27.23 KG/M2 | RESPIRATION RATE: 18 BRPM | SYSTOLIC BLOOD PRESSURE: 194 MMHG | HEIGHT: 66 IN | HEART RATE: 54 BPM | DIASTOLIC BLOOD PRESSURE: 90 MMHG

## 2020-07-22 DIAGNOSIS — D64.9 CHRONIC ANEMIA: Primary | ICD-10-CM

## 2020-07-22 DIAGNOSIS — N18.4 CHRONIC RENAL INSUFFICIENCY, STAGE 4 (SEVERE) (HCC): ICD-10-CM

## 2020-07-22 LAB
HCT VFR BLD CALC: 26.9 % (ref 37–47)
HEMOGLOBIN: 8.4 GM/DL (ref 12–16)
MCH RBC QN AUTO: 28.4 PG (ref 26–33)
MCHC RBC AUTO-ENTMCNC: 31.2 GM/DL (ref 32.2–35.5)
MCV RBC AUTO: 91 FL (ref 81–99)
PDW BLD-RTO: 15.7 % (ref 11.5–14.5)
PLATELET # BLD: 302 THOU/MM3 (ref 130–400)
PMV BLD AUTO: 9 FL (ref 9.4–12.4)
RBC # BLD: 2.96 MILL/MM3 (ref 4.2–5.4)
WBC # BLD: 7.9 THOU/MM3 (ref 4.8–10.8)

## 2020-07-22 PROCEDURE — 85027 COMPLETE CBC AUTOMATED: CPT

## 2020-07-22 PROCEDURE — 96372 THER/PROPH/DIAG INJ SC/IM: CPT

## 2020-07-22 PROCEDURE — 36415 COLL VENOUS BLD VENIPUNCTURE: CPT

## 2020-07-22 PROCEDURE — 6360000002 HC RX W HCPCS: Performed by: INTERNAL MEDICINE

## 2020-07-22 RX ADMIN — EPOETIN ALFA-EPBX 10000 UNITS: 10000 INJECTION, SOLUTION INTRAVENOUS; SUBCUTANEOUS at 09:45

## 2020-07-22 ASSESSMENT — PAIN DESCRIPTION - DESCRIPTORS: DESCRIPTORS: ACHING;CRAMPING

## 2020-07-22 ASSESSMENT — PAIN SCALES - GENERAL: PAINLEVEL_OUTOF10: 6

## 2020-07-22 ASSESSMENT — PAIN DESCRIPTION - LOCATION: LOCATION: LEG

## 2020-07-22 ASSESSMENT — PAIN DESCRIPTION - PAIN TYPE: TYPE: CHRONIC PAIN

## 2020-07-22 NOTE — PLAN OF CARE
Care plan reviewed with patient. Patient verbalized understanding of the plan of care and contribute to goal setting. Problem: Intellectual/Education/Knowledge Deficit  Goal: Teaching initiated upon admission  Outcome: Met This Shift  Note: Patient verbalizes understanding to verbal information given on retacrit,action and possible side effects. Aware to call MD if develop complications. Intervention: Verbal/written education provided  Note: Discuss retacrit     Problem: Discharge Planning  Goal: Knowledge of discharge instructions  Description: Knowledge of discharge instructions  Outcome: Met This Shift  Note: Verbalized understanding of discharge instructions, follow ups and when to call doctor   Intervention: Discharge to appropriate level of care  Note: Discuss discharge instructions, follow ups and when to call doctor. Problem: Falls - Risk of:  Goal: Will remain free from falls  Description: Will remain free from falls  Outcome: Met This Shift  Note: Free from falls while in infusion center.  Verbalized understanding of fall prevention and to ask for assistance with ambulation   Intervention: Assess risk factors for falls  Description: Assess risk factors for falls  Note: Assess for fall risk, instruct to ask for assistance with ambulation

## 2020-07-22 NOTE — PROGRESS NOTES
Retacrit given as charted, tolerated well. discharged in satisfactory condition. Ambulated off unit per self.

## 2020-07-29 ENCOUNTER — HOSPITAL ENCOUNTER (OUTPATIENT)
Dept: INFUSION THERAPY | Age: 65
Discharge: HOME | End: 2020-07-29
Payer: MEDICARE

## 2020-07-29 VITALS
BODY MASS INDEX: 27 KG/M2 | WEIGHT: 168 LBS | HEART RATE: 57 BPM | SYSTOLIC BLOOD PRESSURE: 144 MMHG | TEMPERATURE: 97.3 F | DIASTOLIC BLOOD PRESSURE: 72 MMHG | RESPIRATION RATE: 18 BRPM | OXYGEN SATURATION: 100 % | HEIGHT: 66 IN

## 2020-07-29 DIAGNOSIS — D64.9 CHRONIC ANEMIA: Primary | ICD-10-CM

## 2020-07-29 DIAGNOSIS — N18.4 CHRONIC RENAL INSUFFICIENCY, STAGE 4 (SEVERE) (HCC): ICD-10-CM

## 2020-07-29 DIAGNOSIS — C53.9 MALIGNANT NEOPLASM OF CERVIX, UNSPECIFIED SITE (HCC): ICD-10-CM

## 2020-07-29 DIAGNOSIS — Z51.11 ENCOUNTER FOR CHEMOTHERAPY MANAGEMENT: ICD-10-CM

## 2020-07-29 LAB
HCT VFR BLD CALC: 27.6 % (ref 37–47)
HEMOGLOBIN: 8.8 GM/DL (ref 12–16)
MCH RBC QN AUTO: 28.8 PG (ref 26–33)
MCHC RBC AUTO-ENTMCNC: 31.9 GM/DL (ref 32.2–35.5)
MCV RBC AUTO: 90 FL (ref 81–99)
PDW BLD-RTO: 15.3 % (ref 11.5–14.5)
PLATELET # BLD: 292 THOU/MM3 (ref 130–400)
PMV BLD AUTO: 8.5 FL (ref 9.4–12.4)
RBC # BLD: 3.06 MILL/MM3 (ref 4.2–5.4)
WBC # BLD: 8.1 THOU/MM3 (ref 4.8–10.8)

## 2020-07-29 PROCEDURE — 6360000002 HC RX W HCPCS: Performed by: INTERNAL MEDICINE

## 2020-07-29 PROCEDURE — 85027 COMPLETE CBC AUTOMATED: CPT

## 2020-07-29 PROCEDURE — 36415 COLL VENOUS BLD VENIPUNCTURE: CPT

## 2020-07-29 PROCEDURE — 96372 THER/PROPH/DIAG INJ SC/IM: CPT

## 2020-07-29 RX ORDER — TRAMADOL HYDROCHLORIDE 50 MG/1
50 TABLET ORAL EVERY 6 HOURS PRN
COMMUNITY
End: 2022-06-14

## 2020-07-29 RX ADMIN — EPOETIN ALFA-EPBX 10000 UNITS: 10000 INJECTION, SOLUTION INTRAVENOUS; SUBCUTANEOUS at 10:06

## 2020-07-29 ASSESSMENT — PAIN DESCRIPTION - ONSET: ONSET: ON-GOING

## 2020-07-29 ASSESSMENT — PAIN DESCRIPTION - PAIN TYPE: TYPE: CHRONIC PAIN

## 2020-07-29 ASSESSMENT — PAIN DESCRIPTION - DESCRIPTORS: DESCRIPTORS: DULL

## 2020-07-29 ASSESSMENT — PAIN DESCRIPTION - PROGRESSION: CLINICAL_PROGRESSION: GRADUALLY WORSENING

## 2020-07-29 ASSESSMENT — PAIN DESCRIPTION - FREQUENCY: FREQUENCY: CONTINUOUS

## 2020-07-29 ASSESSMENT — PAIN - FUNCTIONAL ASSESSMENT: PAIN_FUNCTIONAL_ASSESSMENT: PREVENTS OR INTERFERES SOME ACTIVE ACTIVITIES AND ADLS

## 2020-07-29 ASSESSMENT — PAIN SCALES - GENERAL: PAINLEVEL_OUTOF10: 4

## 2020-07-29 NOTE — PLAN OF CARE
Problem: Musculor/Skeletal Functional Status  Goal: Absence of falls  Outcome: Met This Shift  Note: No falls this admission   Intervention: Fall precautions  Note: Patient aware of fall precautions for here and at home -call light in reach while here       Problem: Intellectual/Education/Knowledge Deficit  Goal: Teaching initiated upon admission  Outcome: Met This Shift  Note: Reviewed retacrit with patient, patient offered no questions or concerns. Patient verbalized understanding of drug being administered. Goal: Written Disposition Instruction form completed  Outcome: Met This Shift  Note: Discharge instructions given and reviewed with patient. All questions answered. Patient verbalized understanding   Intervention: Verbal/written education provided  Note: Discharge instruction sheets     Problem: Discharge Planning  Goal: Knowledge of discharge instructions  Description: Knowledge of discharge instructions  Outcome: Met This Shift  Note: Patient  able to teach back follow up appointments and when to call the doctor. Patient offers no questions at this time   Intervention: Interaction with patient/family and care team  Note: All questions answered and support given   Intervention: Discharge to appropriate level of care  Note: Discharge home    Care plan reviewed with patient and she verbalized understanding of the plan of care and contributed to goal setting.

## 2020-08-05 ENCOUNTER — HOSPITAL ENCOUNTER (OUTPATIENT)
Dept: INFUSION THERAPY | Age: 65
Discharge: HOME | End: 2020-08-05
Payer: MEDICARE

## 2020-08-05 VITALS
BODY MASS INDEX: 27.1 KG/M2 | WEIGHT: 168.6 LBS | OXYGEN SATURATION: 100 % | SYSTOLIC BLOOD PRESSURE: 176 MMHG | DIASTOLIC BLOOD PRESSURE: 77 MMHG | HEART RATE: 44 BPM | TEMPERATURE: 97.8 F | RESPIRATION RATE: 18 BRPM | HEIGHT: 66 IN

## 2020-08-05 DIAGNOSIS — N18.4 CHRONIC RENAL INSUFFICIENCY, STAGE 4 (SEVERE) (HCC): ICD-10-CM

## 2020-08-05 DIAGNOSIS — C53.9 MALIGNANT NEOPLASM OF CERVIX, UNSPECIFIED SITE (HCC): ICD-10-CM

## 2020-08-05 DIAGNOSIS — Z51.11 ENCOUNTER FOR CHEMOTHERAPY MANAGEMENT: ICD-10-CM

## 2020-08-05 DIAGNOSIS — D64.9 CHRONIC ANEMIA: Primary | ICD-10-CM

## 2020-08-05 LAB
ALBUMIN SERPL-MCNC: 3.7 G/DL (ref 3.5–5.1)
ANION GAP SERPL CALCULATED.3IONS-SCNC: 17 MEQ/L (ref 8–16)
BUN BLDV-MCNC: 64 MG/DL (ref 7–22)
CALCIUM SERPL-MCNC: 10 MG/DL (ref 8.5–10.5)
CHLORIDE BLD-SCNC: 107 MEQ/L (ref 98–111)
CHOLESTEROL, TOTAL: 190 MG/DL (ref 100–199)
CO2: 16 MEQ/L (ref 23–33)
CREAT SERPL-MCNC: 5.4 MG/DL (ref 0.4–1.2)
CREATININE URINE: 79.3 MG/DL
GFR SERPL CREATININE-BSD FRML MDRD: 8 ML/MIN/1.73M2
GLUCOSE BLD-MCNC: 93 MG/DL (ref 70–108)
HCT VFR BLD CALC: 26.1 % (ref 37–47)
HDLC SERPL-MCNC: 66 MG/DL
HEMOGLOBIN: 8.4 GM/DL (ref 12–16)
LDL CHOLESTEROL CALCULATED: 99 MG/DL
MCH RBC QN AUTO: 28.5 PG (ref 26–33)
MCHC RBC AUTO-ENTMCNC: 32.2 GM/DL (ref 32.2–35.5)
MCV RBC AUTO: 89 FL (ref 81–99)
PDW BLD-RTO: 15.4 % (ref 11.5–14.5)
PHOSPHORUS: 8 MG/DL (ref 2.4–4.7)
PLATELET # BLD: 296 THOU/MM3 (ref 130–400)
PMV BLD AUTO: 9 FL (ref 9.4–12.4)
POTASSIUM SERPL-SCNC: 4.4 MEQ/L (ref 3.5–5.2)
PROT/CREAT RATIO, UR: 5.65
PROTEIN, URINE: 448 MG/DL
RBC # BLD: 2.95 MILL/MM3 (ref 4.2–5.4)
SODIUM BLD-SCNC: 140 MEQ/L (ref 135–145)
TRIGL SERPL-MCNC: 123 MG/DL (ref 0–199)
WBC # BLD: 6.7 THOU/MM3 (ref 4.8–10.8)

## 2020-08-05 PROCEDURE — 80048 BASIC METABOLIC PNL TOTAL CA: CPT

## 2020-08-05 PROCEDURE — 96372 THER/PROPH/DIAG INJ SC/IM: CPT

## 2020-08-05 PROCEDURE — 80061 LIPID PANEL: CPT

## 2020-08-05 PROCEDURE — 82040 ASSAY OF SERUM ALBUMIN: CPT

## 2020-08-05 PROCEDURE — 36415 COLL VENOUS BLD VENIPUNCTURE: CPT

## 2020-08-05 PROCEDURE — 84156 ASSAY OF PROTEIN URINE: CPT

## 2020-08-05 PROCEDURE — 84100 ASSAY OF PHOSPHORUS: CPT

## 2020-08-05 PROCEDURE — 85027 COMPLETE CBC AUTOMATED: CPT

## 2020-08-05 PROCEDURE — 82570 ASSAY OF URINE CREATININE: CPT

## 2020-08-05 PROCEDURE — 6360000002 HC RX W HCPCS: Performed by: INTERNAL MEDICINE

## 2020-08-05 RX ADMIN — EPOETIN ALFA-EPBX 10000 UNITS: 10000 INJECTION, SOLUTION INTRAVENOUS; SUBCUTANEOUS at 10:25

## 2020-08-05 ASSESSMENT — PAIN DESCRIPTION - LOCATION: LOCATION: LEG

## 2020-08-05 ASSESSMENT — PAIN DESCRIPTION - PAIN TYPE: TYPE: CHRONIC PAIN

## 2020-08-05 ASSESSMENT — PAIN SCALES - GENERAL: PAINLEVEL_OUTOF10: 4

## 2020-08-05 ASSESSMENT — PAIN DESCRIPTION - ORIENTATION: ORIENTATION: RIGHT;LEFT

## 2020-08-05 NOTE — PLAN OF CARE
Problem: Musculor/Skeletal Functional Status  Goal: Absence of falls  Description: No falls this admission     Outcome: Met This Shift  Note: No falls this admission   Intervention: Fall precautions  Note: Patient aware of fall precautions for here and at home -call light in reach while here       Problem: Intellectual/Education/Knowledge Deficit  Goal: Teaching initiated upon admission  Outcome: Met This Shift  Note: Reviewed retacrit with patient, patient offered no questions or concerns. Patient verbalized understanding of drug being administered. Goal: Written Disposition Instruction form completed  Outcome: Met This Shift  Note: Discharge instructions given and reviewed with patient. All questions answered. Patient verbalized understanding   Intervention: Verbal/written education provided  Note: Discharge instruction sheets      Problem: Discharge Planning  Intervention: Interaction with patient/family and care team  Note: No concerns addressed -anxious about surgery tomorrow support given  Intervention: Discharge to appropriate level of care  Note: Discharge home   Care plan reviewed with patient and she verbalized understanding of the plan of care and contributed to goal setting.

## 2020-08-05 NOTE — PROGRESS NOTES
Pt discharged in stable condition with verbalization of discharge instructions all questions answered and all  belongings sent with patient. Patient tolerated retacrit without any complications or signs of a reaction.

## 2020-08-12 ENCOUNTER — HOSPITAL ENCOUNTER (OUTPATIENT)
Dept: INFUSION THERAPY | Age: 65
Discharge: HOME | End: 2020-08-12
Payer: MEDICARE

## 2020-08-12 VITALS
BODY MASS INDEX: 28.12 KG/M2 | SYSTOLIC BLOOD PRESSURE: 149 MMHG | OXYGEN SATURATION: 99 % | HEIGHT: 66 IN | WEIGHT: 175 LBS | DIASTOLIC BLOOD PRESSURE: 68 MMHG | HEART RATE: 84 BPM | RESPIRATION RATE: 18 BRPM | TEMPERATURE: 97.4 F

## 2020-08-12 DIAGNOSIS — N18.4 CHRONIC RENAL INSUFFICIENCY, STAGE 4 (SEVERE) (HCC): ICD-10-CM

## 2020-08-12 DIAGNOSIS — Z51.11 ENCOUNTER FOR CHEMOTHERAPY MANAGEMENT: ICD-10-CM

## 2020-08-12 DIAGNOSIS — D64.9 CHRONIC ANEMIA: Primary | ICD-10-CM

## 2020-08-12 DIAGNOSIS — C53.9 MALIGNANT NEOPLASM OF CERVIX, UNSPECIFIED SITE (HCC): ICD-10-CM

## 2020-08-12 LAB
HCT VFR BLD CALC: 24.9 % (ref 37–47)
HEMOGLOBIN: 7.8 GM/DL (ref 12–16)
MCH RBC QN AUTO: 28.6 PG (ref 26–33)
MCHC RBC AUTO-ENTMCNC: 31.3 GM/DL (ref 32.2–35.5)
MCV RBC AUTO: 91 FL (ref 81–99)
PDW BLD-RTO: 15.4 % (ref 11.5–14.5)
PLATELET # BLD: 278 THOU/MM3 (ref 130–400)
PMV BLD AUTO: 9.1 FL (ref 9.4–12.4)
RBC # BLD: 2.73 MILL/MM3 (ref 4.2–5.4)
WBC # BLD: 8.3 THOU/MM3 (ref 4.8–10.8)

## 2020-08-12 PROCEDURE — 36415 COLL VENOUS BLD VENIPUNCTURE: CPT

## 2020-08-12 PROCEDURE — 96372 THER/PROPH/DIAG INJ SC/IM: CPT

## 2020-08-12 PROCEDURE — 85027 COMPLETE CBC AUTOMATED: CPT

## 2020-08-12 PROCEDURE — 99211 OFF/OP EST MAY X REQ PHY/QHP: CPT

## 2020-08-12 PROCEDURE — 6360000002 HC RX W HCPCS: Performed by: INTERNAL MEDICINE

## 2020-08-12 RX ADMIN — EPOETIN ALFA-EPBX 10000 UNITS: 10000 INJECTION, SOLUTION INTRAVENOUS; SUBCUTANEOUS at 10:17

## 2020-08-12 NOTE — PLAN OF CARE
Problem: Musculor/Skeletal Functional Status  Goal: Absence of falls  Outcome: Met This Shift  Note: No falls this admission   Intervention: Fall precautions  Note: Patient aware of fall precautions for here and at home -call light in reach while here       Problem: Intellectual/Education/Knowledge Deficit  Goal: Teaching initiated upon admission  Outcome: Met This Shift  Note: Reviewed retacrit injection  Goal: Written Disposition Instruction form completed  Outcome: Met This Shift  Note: Discharge instructions given and reviewed with patient. All questions answered. Patient verbalized understanding   Intervention: Verbal/written education provided  Note: Discharge instructions sheets      Problem: Discharge Planning  Goal: Knowledge of discharge instructions  Description: Knowledge of discharge instructions  Outcome: Met This Shift  Note: Patient able to teach back follow up appointments and when to call the doctor.  Patient offers no questions at this time   Intervention: Interaction with patient/family and care team  Note: No concerns  Intervention: Discharge to appropriate level of care  Note: Discharge home

## 2020-08-12 NOTE — PROGRESS NOTES
Patient informed that doctor wants to wait and see what hgb is next week since had fistula was placed last week and that could account for blood loss.  Patient agreeable to this and verbalized understanding of signs and symptoms of increasing anemia and when to call the doctor

## 2020-08-19 ENCOUNTER — HOSPITAL ENCOUNTER (OUTPATIENT)
Dept: INFUSION THERAPY | Age: 65
Discharge: HOME | End: 2020-08-19
Payer: MEDICARE

## 2020-08-19 VITALS
SYSTOLIC BLOOD PRESSURE: 158 MMHG | RESPIRATION RATE: 18 BRPM | BODY MASS INDEX: 27.87 KG/M2 | TEMPERATURE: 97.5 F | OXYGEN SATURATION: 98 % | WEIGHT: 173.4 LBS | HEART RATE: 52 BPM | DIASTOLIC BLOOD PRESSURE: 70 MMHG | HEIGHT: 66 IN

## 2020-08-19 DIAGNOSIS — D64.9 CHRONIC ANEMIA: Primary | ICD-10-CM

## 2020-08-19 DIAGNOSIS — N18.4 CHRONIC RENAL INSUFFICIENCY, STAGE 4 (SEVERE) (HCC): ICD-10-CM

## 2020-08-19 LAB
HCT VFR BLD CALC: 23.2 % (ref 37–47)
HEMOGLOBIN: 7.4 GM/DL (ref 12–16)
MCH RBC QN AUTO: 28.8 PG (ref 26–33)
MCHC RBC AUTO-ENTMCNC: 31.9 GM/DL (ref 32.2–35.5)
MCV RBC AUTO: 90 FL (ref 81–99)
PDW BLD-RTO: 15.4 % (ref 11.5–14.5)
PLATELET # BLD: 295 THOU/MM3 (ref 130–400)
PMV BLD AUTO: 9.2 FL (ref 9.4–12.4)
RBC # BLD: 2.57 MILL/MM3 (ref 4.2–5.4)
WBC # BLD: 7.8 THOU/MM3 (ref 4.8–10.8)

## 2020-08-19 PROCEDURE — 36415 COLL VENOUS BLD VENIPUNCTURE: CPT

## 2020-08-19 PROCEDURE — 6360000002 HC RX W HCPCS: Performed by: INTERNAL MEDICINE

## 2020-08-19 PROCEDURE — 99211 OFF/OP EST MAY X REQ PHY/QHP: CPT

## 2020-08-19 PROCEDURE — 96372 THER/PROPH/DIAG INJ SC/IM: CPT

## 2020-08-19 PROCEDURE — 85027 COMPLETE CBC AUTOMATED: CPT

## 2020-08-19 RX ADMIN — EPOETIN ALFA-EPBX 20000 UNITS: 40000 INJECTION, SOLUTION INTRAVENOUS; SUBCUTANEOUS at 10:08

## 2020-08-19 NOTE — PLAN OF CARE
Care plan reviewed with patient. Patient  verbalized understanding of the plan of care and contribute to goal setting. Problem: Intellectual/Education/Knowledge Deficit  Goal: Teaching initiated upon admission  8/19/2020 0950 by Clementina Caicedo RN  Outcome: Met This Shift  8/19/2020 0949 by Clementina Caicedo RN  Outcome: Met This Shift  Note: Patient verbalizes understanding to verbal information given on Retacrit,action and possible side effects. Aware to call MD if develop complications. Intervention: Verbal/written education provided  Note: Discuss retacrit     Problem: Falls - Risk of:  Goal: Will remain free from falls  Description: Will remain free from falls  8/19/2020 0950 by Clementina Caicedo RN  Outcome: Met This Shift  8/19/2020 0949 by Clementina Caicedo RN  Outcome: Met This Shift  Note: Free from falls while in infusion center.  Verbalized understanding of fall prevention and to ask for assistance with ambulation   Intervention: Assess risk factors for falls  Description: Assess risk factors for falls  Note: Assess for fall risk, instruct to ask for assistance with ambulation

## 2020-08-19 NOTE — PROGRESS NOTES
Labs and orders discussed with patient, verbalized understanding. Tolerated Retacrit shot well. Discharged in satisfactory condition.  Ambulated off unit per self

## 2020-08-26 ENCOUNTER — HOSPITAL ENCOUNTER (OUTPATIENT)
Dept: INFUSION THERAPY | Age: 65
Discharge: HOME | End: 2020-08-26
Payer: MEDICARE

## 2020-08-26 VITALS
HEART RATE: 55 BPM | SYSTOLIC BLOOD PRESSURE: 159 MMHG | RESPIRATION RATE: 18 BRPM | BODY MASS INDEX: 27.97 KG/M2 | WEIGHT: 174 LBS | DIASTOLIC BLOOD PRESSURE: 73 MMHG | TEMPERATURE: 97.5 F | HEIGHT: 66 IN

## 2020-08-26 DIAGNOSIS — D64.9 CHRONIC ANEMIA: Primary | ICD-10-CM

## 2020-08-26 DIAGNOSIS — N18.4 CHRONIC RENAL INSUFFICIENCY, STAGE 4 (SEVERE) (HCC): ICD-10-CM

## 2020-08-26 LAB
HCT VFR BLD CALC: 25.2 % (ref 37–47)
HEMOGLOBIN: 7.8 GM/DL (ref 12–16)
MCH RBC QN AUTO: 28.5 PG (ref 26–33)
MCHC RBC AUTO-ENTMCNC: 31 GM/DL (ref 32.2–35.5)
MCV RBC AUTO: 92 FL (ref 81–99)
PDW BLD-RTO: 15.5 % (ref 11.5–14.5)
PLATELET # BLD: 289 THOU/MM3 (ref 130–400)
PMV BLD AUTO: 9.5 FL (ref 9.4–12.4)
RBC # BLD: 2.74 MILL/MM3 (ref 4.2–5.4)
WBC # BLD: 6.8 THOU/MM3 (ref 4.8–10.8)

## 2020-08-26 PROCEDURE — 6360000002 HC RX W HCPCS: Performed by: INTERNAL MEDICINE

## 2020-08-26 PROCEDURE — 96372 THER/PROPH/DIAG INJ SC/IM: CPT

## 2020-08-26 PROCEDURE — 36415 COLL VENOUS BLD VENIPUNCTURE: CPT

## 2020-08-26 PROCEDURE — 85027 COMPLETE CBC AUTOMATED: CPT

## 2020-08-26 RX ORDER — ERGOCALCIFEROL 1.25 MG/1
50000 CAPSULE ORAL WEEKLY
COMMUNITY
End: 2022-06-14

## 2020-08-26 RX ORDER — SODIUM BICARBONATE 325 MG/1
325 TABLET ORAL 2 TIMES DAILY
COMMUNITY
End: 2021-01-19 | Stop reason: ALTCHOICE

## 2020-08-26 RX ADMIN — EPOETIN ALFA-EPBX 20000 UNITS: 40000 INJECTION, SOLUTION INTRAVENOUS; SUBCUTANEOUS at 09:59

## 2020-08-26 NOTE — PLAN OF CARE
Care plan reviewed with patient. Patient verbalized understanding of the plan of care and contribute to goal setting. Problem: Intellectual/Education/Knowledge Deficit  Goal: Teaching initiated upon admission  Outcome: Met This Shift  Note: Patient verbalizes understanding to verbal information given on avastin,action and possible side effects. Aware to call MD if develop complications. Intervention: Verbal/written education provided  Note: Chemotherapy Teaching     What is Chemotherapy   Drug action [x]   Method of Administration [x]   Handouts given []     Side Effects  Nausea/vomiting [x]   Diarrhea [x]   Fatigue [x]   Signs / Symptoms of infection [x]   Neutropenia [x]   Thrombocytopenia [x]   Alopecia [x]   neuropathy [x]   Fleischmanns diet &  the importance of fluids [x]       Micellaneous  Importance of nutrition [x]   Importance of oral hygiene [x]   When to call the MD [x]   Monitoring labs [x]   Use of supportive services []     Explanation of Drug Regimen / Frequency  avastin     Comments  Verbalized understanding to drug,action,side effects and when to call MD         Problem: Discharge Planning  Goal: Knowledge of discharge instructions  Description: Knowledge of discharge instructions  Outcome: Met This Shift  Note: Verbalized understanding of discharge instructions, follow ups and when to call doctor   Intervention: Discharge to appropriate level of care  Note: Discuss discharge instructions, follow ups and when to call doctor. Problem: Falls - Risk of:  Goal: Will remain free from falls  Description: Will remain free from falls  Outcome: Met This Shift  Note: Free from falls while in infusion center.  Verbalized understanding of fall prevention and to ask for assistance with ambulation   Intervention: Assess risk factors for falls  Description: Assess risk factors for falls  Note: Assess for fall risk, instruct to ask for assistance with ambulation

## 2020-08-26 NOTE — PROGRESS NOTES
Retacrit given as charted. Tolerated well. discharged in satisfactory condition.  Ambulated off unit per self

## 2020-08-28 NOTE — PROGRESS NOTES
[facility] CANCER CENTER  CANCER NETWORK OF [facility]  [facility] [address] [facility]  [address]  [address]  Dept: [phone]  Dept Fax: [phone]  Loc: [phone]    Subjective:      Chief Complaint:  Ninoska Ocasio is a 71 y.o. female with cervical cancer. In June 2019, the patient began to develop a vague lower abdominal pain. This is associated sign and symptom progressively became worse to the point that it was severe. In July 2019 the patient developed vaginal bleeding which she describes also severe. This prompted her to present to the emergency department at [facility] in [place]. A pelvic ultrasound was completed which found fullness of the lower uterine segment. A follow-up CT scan of the abdomen pelvis did not show any area of lymphadenopathy or other intra-abdominal or pelvic findings. Eventually an MRI scan of the pelvis was completed which displayed a 5 cm cervical neoplasm with invasion of the parametrial and upper vagina region. There was noted obstruction of the endocervical canal with dilatation of the endometrial cavity. In context to this condition, the patient was then referred to [facility] gynecological oncology for further evaluation. Modifying factor affecting this condition was that on October 26, 2019 the patient underwent biopsy of the exocervix, endocervix, and endometrium. Surgical pathology confirmed squamous cell carcinoma. A follow-up PET scan then was completed which found intense abnormal metabolic activity associated with the primary malignancy but no evidence of metastatic spread of malignancy. 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 HPI:   The patient is here today for follow-up regarding her history of anemia. Her anemia secondary to a low erythropoietin production from renal failure.   She has been on Procrit therapy with modest improvement in her hemoglobin hematocrit. We are going to increase her dose of Procrit therapy to 40,000 units weekly to see if we can get a better response and help with the patient generally feel better. Her only specific complaint on review of systems today is fatigue. She continues to be monitored by her nephrologist for her renal disease. The patient does have a history of cervical cancer and is received radiation therapy treatment with surgical intervention. She did not receive chemotherapy treatment secondary to her kidney disease. The patient has no specific signs or symptoms that be suggestive recurrence of malignancy. She will continue to follow with her gynecologist for further evaluation of her cervical cancer. Patient's ECOG performance status today is level 1. PMH, SH, and FH:  I reviewed the patients medication list and allergy list as noted on the electronic medical record. The PMH, SH and FH were also reviewed as noted on the EMR. Review of Systems  Constitutional: Fatigue. HENT: Negative. Eyes: Negative. Respiratory: Negative. Cardiovascular: Negative. Gastrointestinal: Negative. Genitourinary: Negative. Musculoskeletal: Negative. Skin: Negative. Neurological: Negative. Hematological: Negative. Psychiatric/Behavioral: Negative. Objective:   Physical Exam  Vitals:    09/01/20 1323   BP: (!) 180/78   Pulse: 63   Resp: 18   Temp: 97.3 °F (36.3 °C)   SpO2: 98%   Vitals reviewed and are stable. Constitutional: Well-developed and well-nourished. No acute distress. HENT: Normocephalic and atraumatic. Eyes: Pupils are equal and reactive. No scleral icterus. Neck: Overall appearance is symmetrical. No identifiable masses. Pulmonary: Effort normal. No respiratory distress. Cardiovascular: RRR. No edema in any of the four extremities. Abdominal: Soft. No hepatomegaly or splenomegaly.    Musculoskeletal: Gait is normal. Muscle strength and tone good. Neurological: Alert and oriented to person, place, and time. Judgment and thought content normal.   Psychiatric: Mood and affect appropriate for the clinical situation. Behavior is normal.      Data Analysis: The following studies were reviewed with the patient today:    Hematology 9/1/2020 8/26/2020 8/19/2020   WBC 6.6 6.8 7.8   RBC 2.69 (L) 2.74 (L) 2.57 (L)   HGB 7.5 (L) 7.8 (L) 7.4 (L)   HCT 24.5 (L) 25.2 (L) 23.2 (L)   MCV 91 92 90   RDW 15.3 (H) 15.5 (H) 15.4 (H)    289 295     Assessment:   1. 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 Hypertension. 3.  Renal failure. 4.  Cervical cancer. Plan:   1. Increase dose of weekly Procrit therapy. 2.  Continue weekly CBC. 3.  Monitor hemoglobin/hematocrit and for any signs of blood loss. 4.  Monitor for side effects and toxicity from Procrit. 5.  Monitor for recurrence of malignancy. Shalini Fairchild M.D. Medical Director: [facility]  Cancer Network [address] [employer] [facility], [address] [place] [address] of the [facility] at the [facility]      **This report has been created using voice recognition software. It may contain minor errors which are inherent in voice recognition technology. **

## 2020-09-01 ENCOUNTER — HOSPITAL ENCOUNTER (OUTPATIENT)
Dept: INFUSION THERAPY | Age: 65
Discharge: HOME | End: 2020-09-01
Payer: MEDICARE

## 2020-09-01 ENCOUNTER — OFFICE VISIT (OUTPATIENT)
Dept: ONCOLOGY | Age: 65
End: 2020-09-01
Payer: MEDICARE

## 2020-09-01 VITALS
TEMPERATURE: 97.3 F | BODY MASS INDEX: 27.64 KG/M2 | HEART RATE: 63 BPM | RESPIRATION RATE: 18 BRPM | WEIGHT: 172 LBS | HEIGHT: 66 IN | SYSTOLIC BLOOD PRESSURE: 180 MMHG | DIASTOLIC BLOOD PRESSURE: 78 MMHG | OXYGEN SATURATION: 98 %

## 2020-09-01 VITALS
OXYGEN SATURATION: 99 % | RESPIRATION RATE: 16 BRPM | HEIGHT: 66 IN | SYSTOLIC BLOOD PRESSURE: 162 MMHG | WEIGHT: 172 LBS | BODY MASS INDEX: 27.64 KG/M2 | DIASTOLIC BLOOD PRESSURE: 76 MMHG | TEMPERATURE: 97.3 F | HEART RATE: 50 BPM

## 2020-09-01 DIAGNOSIS — D64.9 CHRONIC ANEMIA: Primary | ICD-10-CM

## 2020-09-01 DIAGNOSIS — N18.4 CHRONIC RENAL INSUFFICIENCY, STAGE 4 (SEVERE) (HCC): ICD-10-CM

## 2020-09-01 LAB
HCT VFR BLD CALC: 24.5 % (ref 37–47)
HEMOGLOBIN: 7.5 GM/DL (ref 12–16)
MCH RBC QN AUTO: 27.9 PG (ref 26–33)
MCHC RBC AUTO-ENTMCNC: 30.6 GM/DL (ref 32.2–35.5)
MCV RBC AUTO: 91 FL (ref 81–99)
PDW BLD-RTO: 15.3 % (ref 11.5–14.5)
PLATELET # BLD: 304 THOU/MM3 (ref 130–400)
PMV BLD AUTO: 9.6 FL (ref 9.4–12.4)
RBC # BLD: 2.69 MILL/MM3 (ref 4.2–5.4)
WBC # BLD: 6.6 THOU/MM3 (ref 4.8–10.8)

## 2020-09-01 PROCEDURE — G8417 CALC BMI ABV UP PARAM F/U: HCPCS | Performed by: INTERNAL MEDICINE

## 2020-09-01 PROCEDURE — G8400 PT W/DXA NO RESULTS DOC: HCPCS | Performed by: INTERNAL MEDICINE

## 2020-09-01 PROCEDURE — 4040F PNEUMOC VAC/ADMIN/RCVD: CPT | Performed by: INTERNAL MEDICINE

## 2020-09-01 PROCEDURE — 99214 OFFICE O/P EST MOD 30 MIN: CPT | Performed by: INTERNAL MEDICINE

## 2020-09-01 PROCEDURE — 96372 THER/PROPH/DIAG INJ SC/IM: CPT

## 2020-09-01 PROCEDURE — 3017F COLORECTAL CA SCREEN DOC REV: CPT | Performed by: INTERNAL MEDICINE

## 2020-09-01 PROCEDURE — G8427 DOCREV CUR MEDS BY ELIG CLIN: HCPCS | Performed by: INTERNAL MEDICINE

## 2020-09-01 PROCEDURE — 1090F PRES/ABSN URINE INCON ASSESS: CPT | Performed by: INTERNAL MEDICINE

## 2020-09-01 PROCEDURE — 1036F TOBACCO NON-USER: CPT | Performed by: INTERNAL MEDICINE

## 2020-09-01 PROCEDURE — 99211 OFF/OP EST MAY X REQ PHY/QHP: CPT

## 2020-09-01 PROCEDURE — 85027 COMPLETE CBC AUTOMATED: CPT

## 2020-09-01 PROCEDURE — 36415 COLL VENOUS BLD VENIPUNCTURE: CPT

## 2020-09-01 PROCEDURE — 1123F ACP DISCUSS/DSCN MKR DOCD: CPT | Performed by: INTERNAL MEDICINE

## 2020-09-01 PROCEDURE — 6360000002 HC RX W HCPCS: Performed by: INTERNAL MEDICINE

## 2020-09-01 RX ORDER — HYDROXYZINE 50 MG/1
50 TABLET, FILM COATED ORAL EVERY 8 HOURS PRN
Qty: 30 TABLET | Refills: 5 | Status: SHIPPED | OUTPATIENT
Start: 2020-09-01 | End: 2020-09-11

## 2020-09-01 RX ADMIN — EPOETIN ALFA-EPBX 40000 UNITS: 40000 INJECTION, SOLUTION INTRAVENOUS; SUBCUTANEOUS at 14:05

## 2020-09-01 ASSESSMENT — PAIN SCALES - GENERAL: PAINLEVEL_OUTOF10: 4

## 2020-09-01 ASSESSMENT — PAIN DESCRIPTION - LOCATION: LOCATION: GENERALIZED

## 2020-09-01 ASSESSMENT — PAIN DESCRIPTION - DESCRIPTORS: DESCRIPTORS: DULL;ACHING

## 2020-09-01 ASSESSMENT — PAIN DESCRIPTION - PAIN TYPE: TYPE: CHRONIC PAIN

## 2020-09-01 NOTE — PLAN OF CARE
Problem: Intellectual/Education/Knowledge Deficit  Goal: Teaching initiated upon admission  Outcome: Met This Shift  Note: Patient verbalizes understanding to verbal information given on retacrit, including action and possible side effects. Aware to call MD if develop complications. Intervention: Verbal/written education provided  Note: Verbal education provided on retacrit injection prior to administration. Problem: Discharge Planning  Goal: Knowledge of discharge instructions  Description: Knowledge of discharge instructions  Outcome: Met This Shift  Note: Patient verbalizes understanding of discharge instructions, follow up appointment, and when to call physician if needed   Intervention: Discharge to appropriate level of care  Note: Discharge instructions given to pt and reviewed. Follow up appointments discussed. Problem: Falls - Risk of:  Goal: Will remain free from falls  Description: Will remain free from falls  Outcome: Met This Shift  Note: Patient free of falls this visit. Intervention: Assess risk factors for falls  Note: Fall risks assessed. Precautions discussed. Call light within reach during visit. Care plan reviewed with patient. Patient verbalizes understanding of the plan of care and contributes to goal setting.

## 2020-09-09 ENCOUNTER — HOSPITAL ENCOUNTER (OUTPATIENT)
Dept: INFUSION THERAPY | Age: 65
Discharge: HOME | End: 2020-09-09
Payer: MEDICARE

## 2020-09-09 VITALS
SYSTOLIC BLOOD PRESSURE: 139 MMHG | OXYGEN SATURATION: 96 % | DIASTOLIC BLOOD PRESSURE: 65 MMHG | HEART RATE: 52 BPM | BODY MASS INDEX: 28.21 KG/M2 | WEIGHT: 174.8 LBS | RESPIRATION RATE: 18 BRPM | TEMPERATURE: 97.5 F

## 2020-09-09 DIAGNOSIS — N18.4 CHRONIC RENAL INSUFFICIENCY, STAGE 4 (SEVERE) (HCC): ICD-10-CM

## 2020-09-09 DIAGNOSIS — D64.9 CHRONIC ANEMIA: Primary | ICD-10-CM

## 2020-09-09 DIAGNOSIS — Z51.11 ENCOUNTER FOR CHEMOTHERAPY MANAGEMENT: ICD-10-CM

## 2020-09-09 DIAGNOSIS — C53.9 MALIGNANT NEOPLASM OF CERVIX, UNSPECIFIED SITE (HCC): ICD-10-CM

## 2020-09-09 LAB
HCT VFR BLD CALC: 25.1 % (ref 37–47)
HEMOGLOBIN: 7.8 GM/DL (ref 12–16)
MCH RBC QN AUTO: 28.1 PG (ref 26–33)
MCHC RBC AUTO-ENTMCNC: 31.1 GM/DL (ref 32.2–35.5)
MCV RBC AUTO: 90 FL (ref 81–99)
PDW BLD-RTO: 15 % (ref 11.5–14.5)
PLATELET # BLD: 337 THOU/MM3 (ref 130–400)
PMV BLD AUTO: 9.4 FL (ref 9.4–12.4)
RBC # BLD: 2.78 MILL/MM3 (ref 4.2–5.4)
WBC # BLD: 7.6 THOU/MM3 (ref 4.8–10.8)

## 2020-09-09 PROCEDURE — 6360000002 HC RX W HCPCS: Performed by: INTERNAL MEDICINE

## 2020-09-09 PROCEDURE — 96372 THER/PROPH/DIAG INJ SC/IM: CPT

## 2020-09-09 PROCEDURE — 99211 OFF/OP EST MAY X REQ PHY/QHP: CPT

## 2020-09-09 PROCEDURE — 85027 COMPLETE CBC AUTOMATED: CPT

## 2020-09-09 PROCEDURE — 36415 COLL VENOUS BLD VENIPUNCTURE: CPT

## 2020-09-09 RX ADMIN — EPOETIN ALFA-EPBX 40000 UNITS: 40000 INJECTION, SOLUTION INTRAVENOUS; SUBCUTANEOUS at 09:51

## 2020-09-09 NOTE — PROGRESS NOTES
Patient informed Dr Rohit Campo would like to continue retacrit and does not feel that that issues last week was due to retacrit since had before and didn't have issues . He would like patient to contact kidney doctor due high creatine levels that she has had recently (drawn other facility) and these issues could be from that. She is agreeable to plan of care for today. She will go to the ER if develops symptoms again .

## 2020-09-16 ENCOUNTER — HOSPITAL ENCOUNTER (OUTPATIENT)
Dept: INFUSION THERAPY | Age: 65
Discharge: HOME | End: 2020-09-16
Payer: MEDICARE

## 2020-09-16 VITALS
HEIGHT: 66 IN | DIASTOLIC BLOOD PRESSURE: 71 MMHG | RESPIRATION RATE: 18 BRPM | WEIGHT: 169.6 LBS | BODY MASS INDEX: 27.26 KG/M2 | SYSTOLIC BLOOD PRESSURE: 154 MMHG | HEART RATE: 57 BPM | OXYGEN SATURATION: 98 % | TEMPERATURE: 97.5 F

## 2020-09-16 DIAGNOSIS — N18.4 CHRONIC RENAL INSUFFICIENCY, STAGE 4 (SEVERE) (HCC): ICD-10-CM

## 2020-09-16 DIAGNOSIS — D64.9 CHRONIC ANEMIA: Primary | ICD-10-CM

## 2020-09-16 LAB
HCT VFR BLD CALC: 26.9 % (ref 37–47)
HEMOGLOBIN: 8.4 GM/DL (ref 12–16)
MCH RBC QN AUTO: 27.3 PG (ref 26–33)
MCHC RBC AUTO-ENTMCNC: 31.2 GM/DL (ref 32.2–35.5)
MCV RBC AUTO: 87 FL (ref 81–99)
PDW BLD-RTO: 15.2 % (ref 11.5–14.5)
PLATELET # BLD: 334 THOU/MM3 (ref 130–400)
PMV BLD AUTO: 8.6 FL (ref 9.4–12.4)
RBC # BLD: 3.08 MILL/MM3 (ref 4.2–5.4)
WBC # BLD: 7.8 THOU/MM3 (ref 4.8–10.8)

## 2020-09-16 PROCEDURE — 6360000002 HC RX W HCPCS: Performed by: INTERNAL MEDICINE

## 2020-09-16 PROCEDURE — 96372 THER/PROPH/DIAG INJ SC/IM: CPT

## 2020-09-16 PROCEDURE — 36415 COLL VENOUS BLD VENIPUNCTURE: CPT

## 2020-09-16 PROCEDURE — 85027 COMPLETE CBC AUTOMATED: CPT

## 2020-09-16 RX ADMIN — EPOETIN ALFA-EPBX 40000 UNITS: 40000 INJECTION, SOLUTION INTRAVENOUS; SUBCUTANEOUS at 09:11

## 2020-09-16 NOTE — PLAN OF CARE
Problem: Musculor/Skeletal Functional Status  Goal: Absence of falls  Outcome: Met This Shift  Note: Patient free of falls this visit. Intervention: Fall precautions  Note: Fall risks assessed. Precautions discussed. Call light within reach during visit. Problem: Intellectual/Education/Knowledge Deficit  Goal: Teaching initiated upon admission  Outcome: Met This Shift  Note: Patient verbalizes understanding to verbal information given on retacrit injection, including action and possible side effects. Aware to call MD if develop complications. Intervention: Verbal/written education provided  Note: Verbal education provided on retacrit injection prior to administration. Problem: Discharge Planning  Goal: Knowledge of discharge instructions  Description: Knowledge of discharge instructions  Outcome: Met This Shift  Note: Patient verbalizes understanding of discharge instructions, follow up appointment, and when to call physician if needed   Intervention: Interaction with patient/family and care team  Note: Discharge instructions given to pt and reviewed. Follow up appointments discussed. Care plan reviewed with patient. Patient verbalizes understanding of the plan of care and contributes to goal setting.

## 2020-09-23 ENCOUNTER — HOSPITAL ENCOUNTER (OUTPATIENT)
Dept: INFUSION THERAPY | Age: 65
Discharge: HOME | End: 2020-09-23
Payer: MEDICARE

## 2020-09-23 VITALS
SYSTOLIC BLOOD PRESSURE: 162 MMHG | RESPIRATION RATE: 18 BRPM | OXYGEN SATURATION: 99 % | BODY MASS INDEX: 27.15 KG/M2 | DIASTOLIC BLOOD PRESSURE: 76 MMHG | HEART RATE: 60 BPM | TEMPERATURE: 97.5 F | WEIGHT: 168.2 LBS

## 2020-09-23 DIAGNOSIS — D64.9 CHRONIC ANEMIA: Primary | ICD-10-CM

## 2020-09-23 DIAGNOSIS — N18.4 CHRONIC RENAL INSUFFICIENCY, STAGE 4 (SEVERE) (HCC): ICD-10-CM

## 2020-09-23 LAB
ANION GAP SERPL CALCULATED.3IONS-SCNC: 17 MEQ/L (ref 8–16)
BUN BLDV-MCNC: 70 MG/DL (ref 7–22)
CALCIUM SERPL-MCNC: 10.4 MG/DL (ref 8.5–10.5)
CHLORIDE BLD-SCNC: 104 MEQ/L (ref 98–111)
CO2: 17 MEQ/L (ref 23–33)
CREAT SERPL-MCNC: 6.8 MG/DL (ref 0.4–1.2)
GFR SERPL CREATININE-BSD FRML MDRD: 6 ML/MIN/1.73M2
GLUCOSE BLD-MCNC: 88 MG/DL (ref 70–108)
HCT VFR BLD CALC: 27.4 % (ref 37–47)
HEMOGLOBIN: 8.6 GM/DL (ref 12–16)
MCH RBC QN AUTO: 27 PG (ref 26–33)
MCHC RBC AUTO-ENTMCNC: 31.4 GM/DL (ref 32.2–35.5)
MCV RBC AUTO: 86 FL (ref 81–99)
PDW BLD-RTO: 15.4 % (ref 11.5–14.5)
PLATELET # BLD: 329 THOU/MM3 (ref 130–400)
PMV BLD AUTO: 9.4 FL (ref 9.4–12.4)
POTASSIUM SERPL-SCNC: 4.8 MEQ/L (ref 3.5–5.2)
RBC # BLD: 3.18 MILL/MM3 (ref 4.2–5.4)
SODIUM BLD-SCNC: 138 MEQ/L (ref 135–145)
WBC # BLD: 8.3 THOU/MM3 (ref 4.8–10.8)

## 2020-09-23 PROCEDURE — 36415 COLL VENOUS BLD VENIPUNCTURE: CPT

## 2020-09-23 PROCEDURE — 80048 BASIC METABOLIC PNL TOTAL CA: CPT

## 2020-09-23 PROCEDURE — 85027 COMPLETE CBC AUTOMATED: CPT

## 2020-09-23 PROCEDURE — 6360000002 HC RX W HCPCS: Performed by: INTERNAL MEDICINE

## 2020-09-23 PROCEDURE — 96372 THER/PROPH/DIAG INJ SC/IM: CPT

## 2020-09-23 RX ADMIN — EPOETIN ALFA-EPBX 40000 UNITS: 40000 INJECTION, SOLUTION INTRAVENOUS; SUBCUTANEOUS at 09:17

## 2020-09-23 NOTE — PLAN OF CARE
Problem: Musculor/Skeletal Functional Status  Goal: Absence of falls  Outcome: Met This Shift  Note: Patient free of falls this visit. Intervention: Fall precautions  Note: Fall risks assessed. Precautions discussed. Call light within reach during visit. Problem: Intellectual/Education/Knowledge Deficit  Goal: Teaching initiated upon admission  Outcome: Met This Shift  Note: Patient verbalizes understanding to verbal information given on retacrit injection, including action and possible side effects. Aware to call MD if develop complications. Intervention: Verbal/written education provided  Note: Verbal education provided on retacrit injection prior to administration. Problem: Discharge Planning  Goal: Knowledge of discharge instructions  Description: Knowledge of discharge instructions  Outcome: Met This Shift  Note: Patient verbalizes understanding of discharge instructions, follow up appointment and when to call physician if needed   Intervention: Interaction with patient/family and care team  Note: Discharge instructions given to pt and reviewed. Follow up appointments discussed. Care plan reviewed with patient. Patient verbalizes understanding of the plan of care and contributes to goal setting.

## 2020-09-30 ENCOUNTER — HOSPITAL ENCOUNTER (OUTPATIENT)
Dept: INFUSION THERAPY | Age: 65
Discharge: HOME | End: 2020-09-30
Payer: MEDICARE

## 2020-09-30 VITALS
DIASTOLIC BLOOD PRESSURE: 87 MMHG | OXYGEN SATURATION: 100 % | SYSTOLIC BLOOD PRESSURE: 176 MMHG | HEART RATE: 55 BPM | BODY MASS INDEX: 27 KG/M2 | RESPIRATION RATE: 18 BRPM | HEIGHT: 66 IN | WEIGHT: 168 LBS | TEMPERATURE: 97.5 F

## 2020-09-30 DIAGNOSIS — N18.4 CHRONIC RENAL INSUFFICIENCY, STAGE 4 (SEVERE) (HCC): ICD-10-CM

## 2020-09-30 DIAGNOSIS — D64.9 CHRONIC ANEMIA: Primary | ICD-10-CM

## 2020-09-30 LAB
HAV IGM SER IA-ACNC: NEGATIVE
HCT VFR BLD CALC: 28.1 % (ref 37–47)
HEMOGLOBIN: 8.6 GM/DL (ref 12–16)
HEPATITIS B CORE IGM ANTIBODY: NEGATIVE
HEPATITIS B SURFACE ANTIGEN: NEGATIVE
HEPATITIS C ANTIBODY: NEGATIVE
MCH RBC QN AUTO: 26.3 PG (ref 26–33)
MCHC RBC AUTO-ENTMCNC: 30.6 GM/DL (ref 32.2–35.5)
MCV RBC AUTO: 86 FL (ref 81–99)
PDW BLD-RTO: 15.6 % (ref 11.5–14.5)
PLATELET # BLD: 334 THOU/MM3 (ref 130–400)
PMV BLD AUTO: 9 FL (ref 9.4–12.4)
RBC # BLD: 3.27 MILL/MM3 (ref 4.2–5.4)
WBC # BLD: 6.7 THOU/MM3 (ref 4.8–10.8)

## 2020-09-30 PROCEDURE — 6360000002 HC RX W HCPCS: Performed by: INTERNAL MEDICINE

## 2020-09-30 PROCEDURE — 36415 COLL VENOUS BLD VENIPUNCTURE: CPT

## 2020-09-30 PROCEDURE — 96372 THER/PROPH/DIAG INJ SC/IM: CPT

## 2020-09-30 PROCEDURE — 80074 ACUTE HEPATITIS PANEL: CPT

## 2020-09-30 PROCEDURE — 85027 COMPLETE CBC AUTOMATED: CPT

## 2020-09-30 RX ADMIN — EPOETIN ALFA-EPBX 40000 UNITS: 40000 INJECTION, SOLUTION INTRAVENOUS; SUBCUTANEOUS at 09:17

## 2020-09-30 NOTE — PLAN OF CARE
Care plan reviewed with patient. Patient  verbalize understanding of the plan of care and contribute to goal setting. Problem: Intellectual/Education/Knowledge Deficit  Goal: Teaching initiated upon admission  Outcome: Met This Shift  Note: Patient verbalizes understanding to verbal information given on retacrit,action and possible side effects. Aware to call MD if develop complications. Intervention: Verbal/written education provided  Note: Discuss retacrit     Problem: Discharge Planning  Goal: Knowledge of discharge instructions  Description: Knowledge of discharge instructions  Outcome: Met This Shift  Note: Verbalized understanding of discharge instructions, follow ups and when to call doctor   Intervention: Discharge to appropriate level of care  Note: Discuss discharge instructions, follow ups and when to call doctor. Problem: Falls - Risk of:  Goal: Will remain free from falls  Description: Will remain free from falls  Outcome: Met This Shift  Note: Free from falls while in infusion center.  Verbalized understanding of fall prevention and to ask for assistance with ambulation   Intervention: Assess risk factors for falls  Description: Assess risk factors for falls  Note: Assess for fall risk, instruct to ask for assistance with ambulation

## 2020-10-07 ENCOUNTER — HOSPITAL ENCOUNTER (OUTPATIENT)
Dept: INFUSION THERAPY | Age: 65
Discharge: HOME | End: 2020-10-07
Payer: MEDICARE

## 2020-10-07 ENCOUNTER — TELEPHONE (OUTPATIENT)
Dept: ONCOLOGY | Age: 65
End: 2020-10-07

## 2020-10-07 VITALS
RESPIRATION RATE: 18 BRPM | HEART RATE: 57 BPM | BODY MASS INDEX: 27.03 KG/M2 | HEIGHT: 66 IN | SYSTOLIC BLOOD PRESSURE: 166 MMHG | OXYGEN SATURATION: 97 % | WEIGHT: 168.2 LBS | DIASTOLIC BLOOD PRESSURE: 79 MMHG | TEMPERATURE: 97.5 F

## 2020-10-07 DIAGNOSIS — D64.9 CHRONIC ANEMIA: Primary | ICD-10-CM

## 2020-10-07 DIAGNOSIS — N18.4 CHRONIC RENAL INSUFFICIENCY, STAGE 4 (SEVERE) (HCC): ICD-10-CM

## 2020-10-07 LAB
HCT VFR BLD CALC: 26.4 % (ref 37–47)
HEMOGLOBIN: 8.1 GM/DL (ref 12–16)
MCH RBC QN AUTO: 25.9 PG (ref 26–33)
MCHC RBC AUTO-ENTMCNC: 30.7 GM/DL (ref 32.2–35.5)
MCV RBC AUTO: 84 FL (ref 81–99)
PDW BLD-RTO: 15.4 % (ref 11.5–14.5)
PLATELET # BLD: 298 THOU/MM3 (ref 130–400)
PMV BLD AUTO: 9.1 FL (ref 9.4–12.4)
RBC # BLD: 3.13 MILL/MM3 (ref 4.2–5.4)
WBC # BLD: 7.8 THOU/MM3 (ref 4.8–10.8)

## 2020-10-07 PROCEDURE — 36415 COLL VENOUS BLD VENIPUNCTURE: CPT

## 2020-10-07 PROCEDURE — 96372 THER/PROPH/DIAG INJ SC/IM: CPT

## 2020-10-07 PROCEDURE — 85027 COMPLETE CBC AUTOMATED: CPT

## 2020-10-07 PROCEDURE — 6360000002 HC RX W HCPCS: Performed by: INTERNAL MEDICINE

## 2020-10-07 PROCEDURE — 99211 OFF/OP EST MAY X REQ PHY/QHP: CPT

## 2020-10-07 RX ADMIN — EPOETIN ALFA-EPBX 40000 UNITS: 40000 INJECTION, SOLUTION INTRAVENOUS; SUBCUTANEOUS at 09:27

## 2020-10-07 ASSESSMENT — PAIN DESCRIPTION - ORIENTATION: ORIENTATION: LEFT

## 2020-10-07 ASSESSMENT — PAIN DESCRIPTION - DESCRIPTORS: DESCRIPTORS: ACHING;DULL;SHARP

## 2020-10-07 ASSESSMENT — PAIN SCALES - GENERAL: PAINLEVEL_OUTOF10: 7

## 2020-10-07 ASSESSMENT — PAIN DESCRIPTION - LOCATION: LOCATION: ARM

## 2020-10-07 ASSESSMENT — PAIN DESCRIPTION - PAIN TYPE: TYPE: ACUTE PAIN

## 2020-10-07 NOTE — PROGRESS NOTES
Patient states she is to start dialysis later today after catheter placed this morning. They were unable to start last week because could not access fistula in left arm. Left arm with swelling and bruising. Called and spoke with Nohemy Mayfield at [address] in [place] regarding patient receives retacrit in this clinic, question if continue while on dialysis. Nohemy Mayfield states their office uses [facility] and will check with nephrologist to see whether their office will take over anemia care while on dialysis. Nohemy Mayfield states would be ok for retacrit today as patient has not started dialysis. Updated Dr Shaye Vallecillo who states ok for retacrit today.

## 2020-10-07 NOTE — TELEPHONE ENCOUNTER
Dr Kristina Carroll patients doctor for dialysis said they would like to manage the patient's anemia.

## 2020-10-07 NOTE — PLAN OF CARE
Problem: Pain:  Goal: Control of acute pain  Description: Control of acute pain  Outcome: Ongoing  Note: Patient having pain in left arm due to failed dialysis attempt with fistula - pain present using over the counter medication - not really controlling pain   Intervention: Assess barriers to pain control  Note: Patient to discuss with surgeon putting in central line catheter and dialysis center for additional pain medication      Problem: Musculor/Skeletal Functional Status  Goal: Absence of falls  Outcome: Met This Shift  Note: No falls this admission   Intervention: Fall precautions  Note: Patient aware of fall precautions for here and at home -call light in reach while here       Problem: Intellectual/Education/Knowledge Deficit  Goal: Teaching initiated upon admission  Outcome: Met This Shift  Note: Reviewed retacrit with patient, patient offered no questions or concerns. Patient verbalized understanding of drug being administered. Goal: Written Disposition Instruction form completed  Outcome: Met This Shift  Note: Discharge instructions given and reviewed with patient. All questions answered. Patient verbalized understanding   Intervention: Verbal/written education provided  Note: Discharge instructions given and reviewed with patient. All questions answered. Patient verbalized understanding      Problem: Discharge Planning  Goal: Knowledge of discharge instructions  Description: Knowledge of discharge instructions  Outcome: Met This Shift  Note: Patient  able to teach back follow up appointments and when to call the doctor. Patient offers no questions at this time  Intervention: Interaction with patient/family and care team  Note: All questions address  Intervention: Discharge to appropriate level of care  Note: Discharge home   Care plan reviewed with patient and she verbalized understanding of the plan of care and contributed to goal setting.

## 2020-10-14 ENCOUNTER — HOSPITAL ENCOUNTER (OUTPATIENT)
Dept: INFUSION THERAPY | Age: 65
Discharge: HOME | End: 2020-10-14
Payer: MEDICARE

## 2020-10-27 ENCOUNTER — TELEPHONE (OUTPATIENT)
Dept: ONCOLOGY | Age: 65
End: 2020-10-27

## 2020-12-15 ENCOUNTER — OFFICE VISIT (OUTPATIENT)
Dept: ONCOLOGY | Age: 65
End: 2020-12-15
Payer: MEDICARE

## 2020-12-15 ENCOUNTER — HOSPITAL ENCOUNTER (OUTPATIENT)
Dept: INFUSION THERAPY | Age: 65
Discharge: HOME | End: 2020-12-15
Payer: MEDICARE

## 2020-12-15 VITALS
TEMPERATURE: 97.3 F | RESPIRATION RATE: 18 BRPM | HEIGHT: 66 IN | HEART RATE: 68 BPM | BODY MASS INDEX: 26.2 KG/M2 | OXYGEN SATURATION: 100 % | DIASTOLIC BLOOD PRESSURE: 80 MMHG | SYSTOLIC BLOOD PRESSURE: 192 MMHG | WEIGHT: 163 LBS

## 2020-12-15 PROCEDURE — G8400 PT W/DXA NO RESULTS DOC: HCPCS | Performed by: INTERNAL MEDICINE

## 2020-12-15 PROCEDURE — 1090F PRES/ABSN URINE INCON ASSESS: CPT | Performed by: INTERNAL MEDICINE

## 2020-12-15 PROCEDURE — G8427 DOCREV CUR MEDS BY ELIG CLIN: HCPCS | Performed by: INTERNAL MEDICINE

## 2020-12-15 PROCEDURE — G8417 CALC BMI ABV UP PARAM F/U: HCPCS | Performed by: INTERNAL MEDICINE

## 2020-12-15 PROCEDURE — 99214 OFFICE O/P EST MOD 30 MIN: CPT | Performed by: INTERNAL MEDICINE

## 2020-12-15 PROCEDURE — 99211 OFF/OP EST MAY X REQ PHY/QHP: CPT

## 2020-12-15 PROCEDURE — 4040F PNEUMOC VAC/ADMIN/RCVD: CPT | Performed by: INTERNAL MEDICINE

## 2020-12-15 PROCEDURE — 1123F ACP DISCUSS/DSCN MKR DOCD: CPT | Performed by: INTERNAL MEDICINE

## 2020-12-15 PROCEDURE — G8484 FLU IMMUNIZE NO ADMIN: HCPCS | Performed by: INTERNAL MEDICINE

## 2020-12-15 PROCEDURE — 1036F TOBACCO NON-USER: CPT | Performed by: INTERNAL MEDICINE

## 2020-12-15 PROCEDURE — 3017F COLORECTAL CA SCREEN DOC REV: CPT | Performed by: INTERNAL MEDICINE

## 2020-12-15 RX ORDER — HYDROXYZINE 50 MG/1
50 TABLET, FILM COATED ORAL EVERY 8 HOURS PRN
Qty: 30 TABLET | Refills: 5 | Status: CANCELLED | OUTPATIENT
Start: 2020-12-15 | End: 2020-12-25

## 2020-12-15 RX ORDER — HYDRALAZINE HYDROCHLORIDE 100 MG/1
100 TABLET, FILM COATED ORAL 3 TIMES DAILY
COMMUNITY

## 2020-12-15 NOTE — PROGRESS NOTES
[facility] CANCER CENTER  CANCER NETWORK OF [facility]  [facility] [address] [facility]  [address]  [address]  Dept: [phone]  Dept Fax: [phone]  Loc: [phone]    Subjective:      Chief Complaint:  Luisito Oreilly is a 76 y.o. female with cervical cancer. In June 2019, the patient began to develop a vague lower abdominal pain. This is associated sign and symptom progressively became worse to the point that it was severe. In July 2019 the patient developed vaginal bleeding which she describes also severe. This prompted her to present to the emergency department at [facility] in [place]. A pelvic ultrasound was completed which found fullness of the lower uterine segment. A follow-up CT scan of the abdomen pelvis did not show any area of lymphadenopathy or other intra-abdominal or pelvic findings. Eventually an MRI scan of the pelvis was completed which displayed a 5 cm cervical neoplasm with invasion of the parametrial and upper vagina region. There was noted obstruction of the endocervical canal with dilatation of the endometrial cavity. In context to this condition, the patient was then referred to [facility] gynecological oncology for further evaluation. Modifying factor affecting this condition was that on October 26, 2019 the patient underwent biopsy of the exocervix, endocervix, and endometrium. Surgical pathology confirmed squamous cell carcinoma. A follow-up PET scan then was completed which found intense abnormal metabolic activity associated with the primary malignancy but no evidence of metastatic spread of malignancy. 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 HPI:   Thelma Rios returns to the hematology clinic today for evaluation. She has a history of anemia associated with chronic renal failure. The patient has been on Procrit therapy without significant improvement in her hemoglobin and/or hematocrit. Received Procrit therapy here at our office but now is receiving erythropoietin stimulation therapy with dialysis by her nephrologist.  The patient is also noted to have a mild hypercalcemia. Her most recent total calcium was 10.6 corrected. She complains of generalized weakness and fatigue but is unclear if this is related to her mild hypercalcemia. The patient does have a history of cervical cancer and has received radiation therapy and surgical evaluation. I recommended she continue to follow-up with her gynecologic oncologist for further evaluation of her malignancy. The patient's ECOG performance status today is level 1-2. She has not had fever, cough, shortness of breath or other signs of infection. The patient denies any recent history of COVID-19 exposure. Her bowel bladder habits have been normal.  She has not had any evidence of vaginal blood loss. PMH, SH, and FH:  I reviewed the patients medication list and allergy list as noted on the electronic medical record. The PMH, SH and FH were also reviewed as noted on the EMR. Review of Systems  Constitutional: Fatigue. HENT: Negative. Eyes: Negative. Respiratory: Negative. Cardiovascular: Negative. Gastrointestinal: Negative. Genitourinary: Negative. Musculoskeletal: Negative. Skin: Negative. Neurological: General weakness. Hematological: Negative. Psychiatric/Behavioral: Negative.       Objective:   Physical Exam  Vitals:    12/15/20 1357 12/15/20 1358   BP: (!) 194/90 (!) 192/80   Site: Right Lower Arm Right Upper Arm   Position: Sitting    Cuff Size: Medium Adult    Pulse: 68    Resp: 18    Temp: 97.3 °F (36.3 °C)    TempSrc: Infrared    SpO2: 100% Weight: 163 lb (73.9 kg)    Height: 5' 6\" (1.676 m)      Assessment:   1. 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 Hypertension. 3.  Renal failure. 4.  Hypercalcemia. Plan:   1. Monitor hemoglobin hematocrit and for any of the blood loss. 2.  Continue erythropoietin stimulation therapy as outlined by her nephrologist with dialysis. 3.  Follow-up with nephrology for continued management of renal failure. 4.  Monitor total calcium level. 5.  Follow-up with gynecology oncology for further evaluation regarding her history of malignancy. Amaury Boudreaux M.D. Medical Director: [facility]  Cancer Network [address] [employer] [facility], [address] [place] [address], [address] [place] [address], [address] [place] [address] of the [facility] at [facility]      **This report has been created using voice recognition software. It may contain minor errors which are inherent in voice recognition technology. **

## 2020-12-18 ENCOUNTER — TELEPHONE (OUTPATIENT)
Dept: ONCOLOGY | Age: 65
End: 2020-12-18

## 2020-12-18 NOTE — TELEPHONE ENCOUNTER
Dr. Rupa Caicedo from [facility] called in regards to patient. She said she needs to speak to you urgently. Please advise.   [phone]

## 2021-01-04 ENCOUNTER — HOSPITAL ENCOUNTER (OUTPATIENT)
Dept: INFUSION THERAPY | Age: 66
Discharge: HOME | End: 2021-01-04
Payer: MEDICARE

## 2021-01-04 ENCOUNTER — OFFICE VISIT (OUTPATIENT)
Dept: ONCOLOGY | Age: 66
End: 2021-01-04
Payer: MEDICARE

## 2021-01-04 VITALS
SYSTOLIC BLOOD PRESSURE: 181 MMHG | DIASTOLIC BLOOD PRESSURE: 71 MMHG | HEART RATE: 71 BPM | WEIGHT: 150 LBS | TEMPERATURE: 98.1 F | HEIGHT: 66 IN | OXYGEN SATURATION: 100 % | RESPIRATION RATE: 18 BRPM | BODY MASS INDEX: 24.11 KG/M2

## 2021-01-04 DIAGNOSIS — C53.9 MALIGNANT NEOPLASM OF CERVIX, UNSPECIFIED SITE (HCC): ICD-10-CM

## 2021-01-04 DIAGNOSIS — E83.52 HYPERCALCEMIA: ICD-10-CM

## 2021-01-04 DIAGNOSIS — N18.4 CHRONIC RENAL INSUFFICIENCY, STAGE 4 (SEVERE) (HCC): ICD-10-CM

## 2021-01-04 DIAGNOSIS — D64.9 CHRONIC ANEMIA: Primary | ICD-10-CM

## 2021-01-04 DIAGNOSIS — D64.9 CHRONIC ANEMIA: ICD-10-CM

## 2021-01-04 LAB
ABSOLUTE IMMATURE GRANULOCYTE: 0.04 THOU/MM3 (ref 0–0.07)
ABSOLUTE RETIC #: 66 THOU/MM3 (ref 20–115)
ALBUMIN SERPL-MCNC: 4.5 G/DL (ref 3.5–5.1)
ALP BLD-CCNC: 82 U/L (ref 38–126)
ALT SERPL-CCNC: 10 U/L (ref 11–66)
AST SERPL-CCNC: 17 U/L (ref 5–40)
BASINOPHIL, AUTOMATED: 1 % (ref 0–3)
BASOPHILS ABSOLUTE: 0 THOU/MM3 (ref 0–0.1)
BILIRUB SERPL-MCNC: 0.4 MG/DL (ref 0.3–1.2)
BILIRUBIN DIRECT: < 0.2 MG/DL (ref 0–0.3)
BUN, WHOLE BLOOD: 16 MG/DL (ref 8–26)
CALCIUM SERPL-MCNC: 13.1 MG/DL (ref 8.5–10.5)
CHLORIDE, WHOLE BLOOD: 98 MEQ/L (ref 98–109)
CREATININE, WHOLE BLOOD: 5.6 MG/DL (ref 0.5–1.2)
EOSINOPHILS ABSOLUTE: 0 THOU/MM3 (ref 0–0.4)
EOSINOPHILS RELATIVE PERCENT: 1 % (ref 0–4)
FERRITIN: 1466 NG/ML (ref 10–291)
FOLATE: > 20 NG/ML (ref 4.8–24.2)
GFR, ESTIMATED ,CON: 8 ML/MIN/1.73M2
GLUCOSE, WHOLE BLOOD: 123 MG/DL (ref 70–108)
HCT VFR BLD CALC: 27.2 % (ref 37–47)
HEMOGLOBIN: 9.2 GM/DL (ref 12–16)
IMMATURE GRANULOCYTES: 1 %
IMMATURE RETIC FRACT: 8 % (ref 3–15.9)
IONIZED CALCIUM, WHOLE BLOOD: 1.39 MMOL/L (ref 1.12–1.32)
IRON: 61 UG/DL (ref 50–170)
LD: 213 U/L (ref 100–190)
LYMPHOCYTES # BLD: 15 % (ref 15–47)
LYMPHOCYTES ABSOLUTE: 0.7 THOU/MM3 (ref 1–4.8)
MAGNESIUM: 2.1 MG/DL (ref 1.6–2.4)
MCH RBC QN AUTO: 29.6 PG (ref 26–33)
MCHC RBC AUTO-ENTMCNC: 33.8 GM/DL (ref 32.2–35.5)
MCV RBC AUTO: 88 FL (ref 81–99)
MONOCYTES ABSOLUTE: 0.5 THOU/MM3 (ref 0.4–1.3)
MONOCYTES: 11 % (ref 0–12)
PDW BLD-RTO: 15.4 % (ref 11.5–14.5)
PLATELET # BLD: 319 THOU/MM3 (ref 130–400)
PMV BLD AUTO: 9.6 FL (ref 9.4–12.4)
POTASSIUM, WHOLE BLOOD: 2.8 MEQ/L (ref 3.5–4.9)
PTH INTACT: 154.7 PG/ML (ref 15–65)
RBC # BLD: 3.11 MILL/MM3 (ref 4.2–5.4)
RETIC HEMOGLOBIN: 35 PG (ref 28.2–35.7)
RETICULOCYTE ABSOLUTE COUNT: 2.1 % (ref 0.5–2)
SEDIMENTATION RATE, ERYTHROCYTE: 26 MM/HR (ref 0–20)
SEG NEUTROPHILS: 72 % (ref 43–75)
SEGMENTED NEUTROPHILS ABSOLUTE COUNT: 3.1 THOU/MM3 (ref 1.8–7.7)
SODIUM, WHOLE BLOOD: 134 MEQ/L (ref 138–146)
TOTAL CO2, WHOLE BLOOD: 20 MEQ/L (ref 23–33)
TOTAL IRON BINDING CAPACITY: 224 UG/DL (ref 171–450)
TOTAL PROTEIN: 6.9 G/DL (ref 6.1–8)
URIC ACID: 4.3 MG/DL (ref 2.4–5.7)
VITAMIN B-12: 987 PG/ML (ref 211–911)
WBC # BLD: 4.3 THOU/MM3 (ref 4.8–10.8)

## 2021-01-04 PROCEDURE — 83540 ASSAY OF IRON: CPT

## 2021-01-04 PROCEDURE — 80047 BASIC METABLC PNL IONIZED CA: CPT

## 2021-01-04 PROCEDURE — 82607 VITAMIN B-12: CPT

## 2021-01-04 PROCEDURE — 86334 IMMUNOFIX E-PHORESIS SERUM: CPT

## 2021-01-04 PROCEDURE — 82784 ASSAY IGA/IGD/IGG/IGM EACH: CPT

## 2021-01-04 PROCEDURE — 80076 HEPATIC FUNCTION PANEL: CPT

## 2021-01-04 PROCEDURE — 83735 ASSAY OF MAGNESIUM: CPT

## 2021-01-04 PROCEDURE — 83970 ASSAY OF PARATHORMONE: CPT

## 2021-01-04 PROCEDURE — 83520 IMMUNOASSAY QUANT NOS NONAB: CPT

## 2021-01-04 PROCEDURE — 83883 ASSAY NEPHELOMETRY NOT SPEC: CPT

## 2021-01-04 PROCEDURE — 3017F COLORECTAL CA SCREEN DOC REV: CPT | Performed by: INTERNAL MEDICINE

## 2021-01-04 PROCEDURE — 99214 OFFICE O/P EST MOD 30 MIN: CPT | Performed by: INTERNAL MEDICINE

## 2021-01-04 PROCEDURE — 1036F TOBACCO NON-USER: CPT | Performed by: INTERNAL MEDICINE

## 2021-01-04 PROCEDURE — 1090F PRES/ABSN URINE INCON ASSESS: CPT | Performed by: INTERNAL MEDICINE

## 2021-01-04 PROCEDURE — 82746 ASSAY OF FOLIC ACID SERUM: CPT

## 2021-01-04 PROCEDURE — 99211 OFF/OP EST MAY X REQ PHY/QHP: CPT

## 2021-01-04 PROCEDURE — 83519 RIA NONANTIBODY: CPT

## 2021-01-04 PROCEDURE — G8420 CALC BMI NORM PARAMETERS: HCPCS | Performed by: INTERNAL MEDICINE

## 2021-01-04 PROCEDURE — 84165 PROTEIN E-PHORESIS SERUM: CPT

## 2021-01-04 PROCEDURE — 1123F ACP DISCUSS/DSCN MKR DOCD: CPT | Performed by: INTERNAL MEDICINE

## 2021-01-04 PROCEDURE — 82310 ASSAY OF CALCIUM: CPT

## 2021-01-04 PROCEDURE — 83550 IRON BINDING TEST: CPT

## 2021-01-04 PROCEDURE — 4040F PNEUMOC VAC/ADMIN/RCVD: CPT | Performed by: INTERNAL MEDICINE

## 2021-01-04 PROCEDURE — 82668 ASSAY OF ERYTHROPOIETIN: CPT

## 2021-01-04 PROCEDURE — G8427 DOCREV CUR MEDS BY ELIG CLIN: HCPCS | Performed by: INTERNAL MEDICINE

## 2021-01-04 PROCEDURE — 36415 COLL VENOUS BLD VENIPUNCTURE: CPT

## 2021-01-04 PROCEDURE — 85651 RBC SED RATE NONAUTOMATED: CPT

## 2021-01-04 PROCEDURE — 82728 ASSAY OF FERRITIN: CPT

## 2021-01-04 PROCEDURE — 84155 ASSAY OF PROTEIN SERUM: CPT

## 2021-01-04 PROCEDURE — 85046 RETICYTE/HGB CONCENTRATE: CPT

## 2021-01-04 PROCEDURE — G8484 FLU IMMUNIZE NO ADMIN: HCPCS | Performed by: INTERNAL MEDICINE

## 2021-01-04 PROCEDURE — G8400 PT W/DXA NO RESULTS DOC: HCPCS | Performed by: INTERNAL MEDICINE

## 2021-01-04 PROCEDURE — 83010 ASSAY OF HAPTOGLOBIN QUANT: CPT

## 2021-01-04 PROCEDURE — 85025 COMPLETE CBC W/AUTO DIFF WBC: CPT

## 2021-01-04 PROCEDURE — 84550 ASSAY OF BLOOD/URIC ACID: CPT

## 2021-01-04 PROCEDURE — 83615 LACTATE (LD) (LDH) ENZYME: CPT

## 2021-01-04 NOTE — PATIENT INSTRUCTIONS
1. Labs today. 2.  Schedule CT scans at [facility] in [facility]. 3.  Return to clinic to review the results of CT scans.

## 2021-01-06 LAB
ERYTHROPOIETIN: 478 MU/ML (ref 4–27)
HAPTOGLOBIN: 43 MG/DL (ref 30–200)

## 2021-01-07 LAB — KAPPA/LAMBDA FREE LIGHT CHAINS: NORMAL

## 2021-01-08 LAB — MISC. #1 REFERENCE GROUP TEST: ABNORMAL

## 2021-01-10 LAB — PTH RELATED PEPTIDE: 6.7 PMOL/L (ref 0–3.4)

## 2021-01-11 NOTE — PROGRESS NOTES
[facility] CANCER CENTER  CANCER NETWORK OF [facility]  [facility] [address] [facility] 98  [address]  [address]  Dept: [phone]  Dept Fax: [phone]  Loc: [phone]    Subjective:      Chief Complaint:  Stacy Dunn is a 76 y.o. female with cervical cancer. In June 2019, the patient began to develop a vague lower abdominal pain. This is associated sign and symptom progressively became worse to the point that it was severe. In July 2019 the patient developed vaginal bleeding which she describes also severe. This prompted her to present to the emergency department at [facility] in [place]. A pelvic ultrasound was completed which found fullness of the lower uterine segment. A follow-up CT scan of the abdomen pelvis did not show any area of lymphadenopathy or other intra-abdominal or pelvic findings. Eventually an MRI scan of the pelvis was completed which displayed a 5 cm cervical neoplasm with invasion of the parametrial and upper vagina region. There was noted obstruction of the endocervical canal with dilatation of the endometrial cavity. In context to this condition, the patient was then referred to [facility] gynecological oncology for further evaluation. Modifying factor affecting this condition was that on October 26, 2019 the patient underwent biopsy of the exocervix, endocervix, and endometrium. Surgical pathology confirmed squamous cell carcinoma. A follow-up PET scan then was completed which found intense abnormal metabolic activity associated with the primary malignancy but no evidence of metastatic spread of malignancy. 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 HPI:   The patient is here today for follow-up evaluation.   She has a history of multiple medical problems including cervical cancer, chronic renal failure, recent initiation of dialysis, chronic anemia, and hypercalcemia. The patient has previously received therapy with Procrit for erythropoietin deficiency with no specific response. The etiology of her hypercalcemia is not clearly identified. She complains of generalized weakness and fatigue but does not complain of any neurological complaints. The patient has not noticed any changes in her mentation. In regards to her past cervical cancer history she received radiation therapy treatment. She does not report any evidence of vaginal bleeding or blood loss. The patient has not follow-up with gynecological oncology as she has difficulty with transportation issues. She has not had fever, cough, shortness of breath or other signs of infection. The patient has an ECOG performance status of level 1. PMH, SH, and FH:  I reviewed the patients medication list and allergy list as noted on the electronic medical record. The PMH, SH and FH were also reviewed as noted on the EMR. Review of Systems  She complains of fatigue and generalized weakness. Objective:   Physical Exam  Vitals:    01/04/21 1154   BP: (!) 181/71   Site: Right Upper Arm   Position: Sitting   Cuff Size: Medium Adult   Pulse: 71   Resp: 18   Temp: 98.1 °F (36.7 °C)   TempSrc: Oral   SpO2: 100%   Weight: 150 lb (68 kg)   Height: 5' 6\" (1.676 m)   Vitals reviewed and are stable. Constitutional: Well-developed. No acute distress. HENT: Normocephalic and atraumatic. Eyes: Pupils appear equal and reactive. Neck: Overall appearance is symmetrical. No identifiable masses. Pulmonary: Effort normal. No respiratory distress. .  Neurological: Alert and oriented to person, place, and time. Judgment and thought content normal.  Skin: Skin is warm and dry. No rash. Psychiatric: Mood and affect appropriate for the clinical situation. Assessment:   1. 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 Hypertension. 3.  Renal failure.   4. Hypercalcemia. Plan:   1. Obtain the following laboratory studies today:   CBC Auto Differential    POC PANEL BMP W/IOCA    Hepatic Function Panel    [place]/Lambda Free Lt Chains, Serum Quant    Ferritin    Iron and TIBC    Vitamin B12 & Folate    Reticulocytes    Haptoglobin    Erythropoietin    Sedimentation Rate    Calcium    Magnesium    Uric Acid    Lactate Dehydrogenase    PTH, Intact    PTH-Related Peptide   2. Schedule CT scans of chest, abdomen and pelvis at [facility] in [place]. 3.  Continue erythropoietin stimulation therapy as outlined by her nephrologist with dialysis. 4.  Follow-up with nephrology for continued management of renal failure. .  Monitor total calcium level. 5.  Monitor hemoglobin/hematocrit and for any signs of blood loss. 6.  Monitor calcium level. Xena White M.D. Medical Director: [facility]  Cancer [address] [place] [facility], [address] [place] [address], [address] [place] [address], [address] [place] [address] of the [facility] at the [facility]      **This report has been created using voice recognition software. It may contain minor errors which are inherent in voice recognition technology. **

## 2021-01-13 ENCOUNTER — HOSPITAL ENCOUNTER (OUTPATIENT)
Dept: INFUSION THERAPY | Age: 66
Discharge: HOME | End: 2021-01-13
Payer: MEDICARE

## 2021-01-13 ENCOUNTER — OFFICE VISIT (OUTPATIENT)
Dept: ONCOLOGY | Age: 66
End: 2021-01-13
Payer: MEDICARE

## 2021-01-13 VITALS
HEIGHT: 66 IN | SYSTOLIC BLOOD PRESSURE: 226 MMHG | BODY MASS INDEX: 23.33 KG/M2 | OXYGEN SATURATION: 99 % | DIASTOLIC BLOOD PRESSURE: 94 MMHG | TEMPERATURE: 98.8 F | HEART RATE: 70 BPM | RESPIRATION RATE: 20 BRPM | WEIGHT: 145.2 LBS

## 2021-01-13 DIAGNOSIS — E83.52 HYPERCALCEMIA: ICD-10-CM

## 2021-01-13 DIAGNOSIS — N18.4 CHRONIC RENAL INSUFFICIENCY, STAGE 4 (SEVERE) (HCC): ICD-10-CM

## 2021-01-13 DIAGNOSIS — C53.9 MALIGNANT NEOPLASM OF CERVIX, UNSPECIFIED SITE (HCC): ICD-10-CM

## 2021-01-13 DIAGNOSIS — D64.9 CHRONIC ANEMIA: Primary | ICD-10-CM

## 2021-01-13 PROCEDURE — 99215 OFFICE O/P EST HI 40 MIN: CPT | Performed by: INTERNAL MEDICINE

## 2021-01-13 PROCEDURE — 4040F PNEUMOC VAC/ADMIN/RCVD: CPT | Performed by: INTERNAL MEDICINE

## 2021-01-13 PROCEDURE — G8427 DOCREV CUR MEDS BY ELIG CLIN: HCPCS | Performed by: INTERNAL MEDICINE

## 2021-01-13 PROCEDURE — G8484 FLU IMMUNIZE NO ADMIN: HCPCS | Performed by: INTERNAL MEDICINE

## 2021-01-13 PROCEDURE — 1036F TOBACCO NON-USER: CPT | Performed by: INTERNAL MEDICINE

## 2021-01-13 PROCEDURE — G8420 CALC BMI NORM PARAMETERS: HCPCS | Performed by: INTERNAL MEDICINE

## 2021-01-13 PROCEDURE — G8400 PT W/DXA NO RESULTS DOC: HCPCS | Performed by: INTERNAL MEDICINE

## 2021-01-13 PROCEDURE — 1090F PRES/ABSN URINE INCON ASSESS: CPT | Performed by: INTERNAL MEDICINE

## 2021-01-13 PROCEDURE — 1123F ACP DISCUSS/DSCN MKR DOCD: CPT | Performed by: INTERNAL MEDICINE

## 2021-01-13 PROCEDURE — 99211 OFF/OP EST MAY X REQ PHY/QHP: CPT

## 2021-01-13 PROCEDURE — 3017F COLORECTAL CA SCREEN DOC REV: CPT | Performed by: INTERNAL MEDICINE

## 2021-01-13 NOTE — PATIENT INSTRUCTIONS
1.  Schedule patient for 1 dose of therapy with Xgeva once pre-CERT has been obtained. 2.  Return to clinic to see me approximately 2 weeks after therapy with Xgeva  3. Labs on the day that she gets Xgeva as well as on day return to clinic.

## 2021-01-13 NOTE — PROGRESS NOTES
[facility] CANCER CENTER  CANCER NETWORK OF [facility]  [facility] [address] [facility] 98  [address]  [address]  Dept: [phone]  Dept Fax: [phone]  Loc: [phone]    Subjective:      Chief Complaint:  Miguel Ángel Gómez is a 76 y.o. female with cervical cancer. In June 2019, the patient began to develop a vague lower abdominal pain. This is associated sign and symptom progressively became worse to the point that it was severe. In July 2019 the patient developed vaginal bleeding which she describes also severe. This prompted her to present to the emergency department at [facility] in [place]. A pelvic ultrasound was completed which found fullness of the lower uterine segment. A follow-up CT scan of the abdomen pelvis did not show any area of lymphadenopathy or other intra-abdominal or pelvic findings. Eventually an MRI scan of the pelvis was completed which displayed a 5 cm cervical neoplasm with invasion of the parametrial and upper vagina region. There was noted obstruction of the endocervical canal with dilatation of the endometrial cavity. In context to this condition, the patient was then referred to [facility] gynecological oncology for further evaluation. Modifying factor affecting this condition was that on October 26, 2019 the patient underwent biopsy of the exocervix, endocervix, and endometrium. Surgical pathology confirmed squamous cell carcinoma. A follow-up PET scan then was completed which found intense abnormal metabolic activity associated with the primary malignancy but no evidence of metastatic spread of malignancy. 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     HPI:   Sarah Smith is here today for follow-up regarding multiple medical issues including a history of cervical cancer, chronic renal failure, chronic anemia, and hypercalcemia. The patient's hypercalcemia is not easily explained as there is no clear etiology. I have recommended to the patient that she obtain further radiographic studies to evaluate for malignancy given her history of cervical cancer and she does not want to proceed with that at this time. In addition, I have recommended therapy with Alessio Iglesias as a way to attempt to try to control her hypercalcemia that has become more elevated. She is agreeable to proceed with this plan of care. The patient complains of generalized weakness and fatigue. She continues on dialysis 3 times a week. Her hemoglobin hematocrit remain low but stable. She likely has at least in part anemia related to her hematological disease. Laboratory studies do not indicate any other clear etiology to her anemia. The patient denies seeing any evidence of blood loss. She has not had fever, cough, shortness of breath or other signs of infection. The patient's ECOG performance status is level 1-2. PMH, SH, and FH:  I reviewed the patients medication list and allergy list as noted on the electronic medical record. The PMH, SH and FH were also reviewed as noted on the EMR. Review of Syste ms  Constitutional: Fatigue. HENT: Negative. Eyes: Negative. Respiratory: Negative. Cardiovascular: Negative. Gastrointestinal: Negative. Genitourinary: Negative. Musculoskeletal: Negative. Skin: Negative. Neurological: General weakness. Hematological: Negative. Psychiatric/Behavioral: Negative. Objective:   Physical Exam  Vitals:    01/13/21 1136 01/13/21 1137   BP: (!) 224/105 (!) 226/94   Site: Right Upper Arm Right Upper Arm   Position: Sitting Sitting   Cuff Size: Medium Adult Small Adult   Pulse: 70    Resp: 20    Temp: 98.8 °F (37.1 °C)    TempSrc: Oral    SpO2: 99%    Weight: 145 lb 3.2 oz (65.9 kg)    Height: 5' 6\" (1.676 m)    Vitals reviewed and are stable. Constitutional: Appears older then stated age.  No acute distress. HENT: Normocephalic and atraumatic. Eyes: Pupils appear equal. No scleral icterus. Neck: Bilateral appearance is symmetrical. No identifiable masses. Chest: Inspection and palpation of chest is normal.  Pulmonary: Effort normal. No respiratory distress. Cardiovascular: Nn edema in any of the four extremities. Abdominal: Soft. Bowel sounds present. No hepatomegaly or splenomegaly. Musculoskeletal: Gait is abnormal. Muscle strength and tone grossly decreased. Neurological: Alert and oriented to person, place, and time. Judgment and thought content normal.  Skin: Scattered ecchymosis noted on bilateral upper forearms. Psychiatric: Mood and affect appropriate for the clinical situation. Data Analysis: The following laboratory studies were reviewed with the patient today:    Hematology 1/19/2021 1/4/2021   WBC 3.6 (L) 4.3 (L)   RBC 3.72 (L) 3.11 (L)   HGB 10.9 (L) 9.2 (L)   HCT 34.2 (L) 27.2 (L)   MCV 92 88   RDW 15.8 (H) 15.4 (H)    319   ESR  26 (H)   Ferritin  1,466 (H)   Iron  61   TIBC  224     Assessment:   1. 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 Hypertension. 3.  Renal failure. 4.  Hypercalcemia. Plan:   1.  Schedule patient for 1 dose of therapy with Xgeva once pre-CERT has been obtained. 2.  Return to clinic to see me approximately 2 weeks after therapy with Xgeva  3. Labs on the day that she gets Xgeva as well as on day return to clinic. 4.   Continue erythropoietin stimulation therapy as outlined by her nephrologist with dialysis. 5.  Follow-up with nephrology for continued management of renal failure. 6.  Monitor hemoglobin/hematocrit and for any signs of blood loss. 7.  Monitor calcium level. Louise Hurtado M.D.                                                                          Medical Director: [facility]  Cancer Network of [facility] [address] [place] [address], [address] [place] [address], [address] [place] [address], [address] of the [facility] Adriana DAN at the [facility]      **This report has been created using voice recognition software. It may contain minor errors which are inherent in voice recognition technology. **

## 2021-01-19 ENCOUNTER — HOSPITAL ENCOUNTER (OUTPATIENT)
Dept: INFUSION THERAPY | Age: 66
Discharge: HOME | End: 2021-01-19
Payer: MEDICARE

## 2021-01-19 VITALS
HEART RATE: 73 BPM | WEIGHT: 142.8 LBS | BODY MASS INDEX: 23.05 KG/M2 | OXYGEN SATURATION: 97 % | DIASTOLIC BLOOD PRESSURE: 76 MMHG | SYSTOLIC BLOOD PRESSURE: 167 MMHG | TEMPERATURE: 97.8 F | RESPIRATION RATE: 18 BRPM

## 2021-01-19 DIAGNOSIS — Z51.11 ENCOUNTER FOR CHEMOTHERAPY MANAGEMENT: ICD-10-CM

## 2021-01-19 DIAGNOSIS — E83.52 HYPERCALCEMIA: Primary | ICD-10-CM

## 2021-01-19 DIAGNOSIS — D64.9 CHRONIC ANEMIA: ICD-10-CM

## 2021-01-19 DIAGNOSIS — C53.9 MALIGNANT NEOPLASM OF CERVIX, UNSPECIFIED SITE (HCC): ICD-10-CM

## 2021-01-19 LAB
BUN, WHOLE BLOOD: 8 MG/DL (ref 8–26)
CHLORIDE, WHOLE BLOOD: 101 MEQ/L (ref 98–109)
CREATININE, WHOLE BLOOD: 4.2 MG/DL (ref 0.5–1.2)
GFR, ESTIMATED ,CON: 11 ML/MIN/1.73M2
GLUCOSE, WHOLE BLOOD: 91 MG/DL (ref 70–108)
HCT VFR BLD CALC: 34.2 % (ref 37–47)
HEMOGLOBIN: 10.9 GM/DL (ref 12–16)
IONIZED CALCIUM, WHOLE BLOOD: 1.01 MMOL/L (ref 1.12–1.32)
MCH RBC QN AUTO: 29.3 PG (ref 26–33)
MCHC RBC AUTO-ENTMCNC: 31.9 GM/DL (ref 32.2–35.5)
MCV RBC AUTO: 92 FL (ref 81–99)
PDW BLD-RTO: 15.8 % (ref 11.5–14.5)
PLATELET # BLD: 287 THOU/MM3 (ref 130–400)
PMV BLD AUTO: 9.1 FL (ref 9.4–12.4)
POTASSIUM, WHOLE BLOOD: 3.6 MEQ/L (ref 3.5–4.9)
RBC # BLD: 3.72 MILL/MM3 (ref 4.2–5.4)
SODIUM, WHOLE BLOOD: 133 MEQ/L (ref 138–146)
TOTAL CO2, WHOLE BLOOD: 22 MEQ/L (ref 23–33)
WBC # BLD: 3.6 THOU/MM3 (ref 4.8–10.8)

## 2021-01-19 PROCEDURE — 85027 COMPLETE CBC AUTOMATED: CPT

## 2021-01-19 PROCEDURE — 82306 VITAMIN D 25 HYDROXY: CPT

## 2021-01-19 PROCEDURE — 96372 THER/PROPH/DIAG INJ SC/IM: CPT

## 2021-01-19 PROCEDURE — 80047 BASIC METABLC PNL IONIZED CA: CPT

## 2021-01-19 PROCEDURE — 6360000002 HC RX W HCPCS: Performed by: INTERNAL MEDICINE

## 2021-01-19 PROCEDURE — 83735 ASSAY OF MAGNESIUM: CPT

## 2021-01-19 PROCEDURE — 36415 COLL VENOUS BLD VENIPUNCTURE: CPT

## 2021-01-19 PROCEDURE — 82310 ASSAY OF CALCIUM: CPT

## 2021-01-19 RX ORDER — HYDROXYZINE HYDROCHLORIDE 25 MG/1
50 TABLET, FILM COATED ORAL 3 TIMES DAILY PRN
COMMUNITY
End: 2022-06-14

## 2021-01-19 RX ORDER — VITAMIN B COMPLEX
1 CAPSULE ORAL DAILY
COMMUNITY
End: 2022-06-14

## 2021-01-19 RX ADMIN — DENOSUMAB 120 MG: 120 INJECTION SUBCUTANEOUS at 13:42

## 2021-01-19 NOTE — PLAN OF CARE
Problem: Musculor/Skeletal Functional Status  Goal: Absence of falls  Outcome: Met This Shift  Note: Patient verbalizes understanding of fall precautions. Patient free from falls this visit. Intervention: Fall precautions  Note: Discussed fall precautions, call light within reach. Problem: Intellectual/Education/Knowledge Deficit  Goal: Teaching initiated upon admission  Outcome: Met This Shift  Note: Patient verbalizes understanding to verbal information given on xgeva,action and possible side effects. Aware to call MD if develop complications. Intervention: Verbal/written education provided  Note: Discussed xgeva action, possible side effects and when to call the doctor. Problem: Discharge Planning  Goal: Knowledge of discharge instructions  Description: Knowledge of discharge instructions  Outcome: Met This Shift  Note: Verbalized understanding of discharge instructions, follow-up appointments, and when to call the physician. Intervention: Discharge to appropriate level of care  Note: Discuss discharge instructions, follow ups, and when to call the doctor. Care plan reviewed with patient. Patient verbalizes understanding of the plan of care and contribute to goal setting.

## 2021-01-20 LAB
CALCIUM SERPL-MCNC: 11.2 MG/DL (ref 8.5–10.5)
MAGNESIUM: 2.4 MG/DL (ref 1.6–2.4)
VITAMIN D 25-HYDROXY: 39 NG/ML (ref 30–100)

## 2021-02-02 ENCOUNTER — OFFICE VISIT (OUTPATIENT)
Dept: ONCOLOGY | Age: 66
End: 2021-02-02
Payer: MEDICARE

## 2021-02-02 ENCOUNTER — HOSPITAL ENCOUNTER (OUTPATIENT)
Dept: INFUSION THERAPY | Age: 66
Discharge: HOME | End: 2021-02-02
Payer: MEDICARE

## 2021-02-02 VITALS
SYSTOLIC BLOOD PRESSURE: 168 MMHG | HEIGHT: 66 IN | DIASTOLIC BLOOD PRESSURE: 90 MMHG | WEIGHT: 140.2 LBS | OXYGEN SATURATION: 98 % | TEMPERATURE: 98.4 F | RESPIRATION RATE: 18 BRPM | BODY MASS INDEX: 22.53 KG/M2 | HEART RATE: 82 BPM

## 2021-02-02 DIAGNOSIS — D64.9 CHRONIC ANEMIA: ICD-10-CM

## 2021-02-02 DIAGNOSIS — C53.9 MALIGNANT NEOPLASM OF CERVIX, UNSPECIFIED SITE (HCC): Primary | ICD-10-CM

## 2021-02-02 DIAGNOSIS — N18.4 CHRONIC RENAL INSUFFICIENCY, STAGE 4 (SEVERE) (HCC): ICD-10-CM

## 2021-02-02 DIAGNOSIS — E83.52 HYPERCALCEMIA: ICD-10-CM

## 2021-02-02 DIAGNOSIS — C53.9 MALIGNANT NEOPLASM OF CERVIX, UNSPECIFIED SITE (HCC): ICD-10-CM

## 2021-02-02 LAB
ABSOLUTE IMMATURE GRANULOCYTE: 0.06 THOU/MM3 (ref 0–0.07)
BASINOPHIL, AUTOMATED: 1 % (ref 0–3)
BASOPHILS ABSOLUTE: 0 THOU/MM3 (ref 0–0.1)
BUN, WHOLE BLOOD: 12 MG/DL (ref 8–26)
CHLORIDE, WHOLE BLOOD: 100 MEQ/L (ref 98–109)
CREATININE, WHOLE BLOOD: 3.9 MG/DL (ref 0.5–1.2)
EOSINOPHILS ABSOLUTE: 0.1 THOU/MM3 (ref 0–0.4)
EOSINOPHILS RELATIVE PERCENT: 2 % (ref 0–4)
GFR, ESTIMATED ,CON: 12 ML/MIN/1.73M2
GLUCOSE, WHOLE BLOOD: 121 MG/DL (ref 70–108)
HCT VFR BLD CALC: 37.7 % (ref 37–47)
HEMOGLOBIN: 12.1 GM/DL (ref 12–16)
IMMATURE GRANULOCYTES: 1 %
IONIZED CALCIUM, WHOLE BLOOD: 0.85 MMOL/L (ref 1.12–1.32)
LYMPHOCYTES # BLD: 11 % (ref 15–47)
LYMPHOCYTES ABSOLUTE: 0.5 THOU/MM3 (ref 1–4.8)
MCH RBC QN AUTO: 28.5 PG (ref 26–33)
MCHC RBC AUTO-ENTMCNC: 32.1 GM/DL (ref 32.2–35.5)
MCV RBC AUTO: 89 FL (ref 81–99)
MONOCYTES ABSOLUTE: 0.6 THOU/MM3 (ref 0.4–1.3)
MONOCYTES: 12 % (ref 0–12)
PDW BLD-RTO: 15.5 % (ref 11.5–14.5)
PLATELET # BLD: 304 THOU/MM3 (ref 130–400)
PMV BLD AUTO: 9.3 FL (ref 9.4–12.4)
POTASSIUM, WHOLE BLOOD: 3.3 MEQ/L (ref 3.5–4.9)
RBC # BLD: 4.25 MILL/MM3 (ref 4.2–5.4)
SEG NEUTROPHILS: 75 % (ref 43–75)
SEGMENTED NEUTROPHILS ABSOLUTE COUNT: 3.5 THOU/MM3 (ref 1.8–7.7)
SODIUM, WHOLE BLOOD: 136 MEQ/L (ref 138–146)
TOTAL CO2, WHOLE BLOOD: 26 MEQ/L (ref 23–33)
WBC # BLD: 4.7 THOU/MM3 (ref 4.8–10.8)

## 2021-02-02 PROCEDURE — 85025 COMPLETE CBC W/AUTO DIFF WBC: CPT

## 2021-02-02 PROCEDURE — G8400 PT W/DXA NO RESULTS DOC: HCPCS | Performed by: INTERNAL MEDICINE

## 2021-02-02 PROCEDURE — 82310 ASSAY OF CALCIUM: CPT

## 2021-02-02 PROCEDURE — 99211 OFF/OP EST MAY X REQ PHY/QHP: CPT

## 2021-02-02 PROCEDURE — 1036F TOBACCO NON-USER: CPT | Performed by: INTERNAL MEDICINE

## 2021-02-02 PROCEDURE — 1123F ACP DISCUSS/DSCN MKR DOCD: CPT | Performed by: INTERNAL MEDICINE

## 2021-02-02 PROCEDURE — 36415 COLL VENOUS BLD VENIPUNCTURE: CPT

## 2021-02-02 PROCEDURE — 99214 OFFICE O/P EST MOD 30 MIN: CPT | Performed by: INTERNAL MEDICINE

## 2021-02-02 PROCEDURE — 3017F COLORECTAL CA SCREEN DOC REV: CPT | Performed by: INTERNAL MEDICINE

## 2021-02-02 PROCEDURE — 4040F PNEUMOC VAC/ADMIN/RCVD: CPT | Performed by: INTERNAL MEDICINE

## 2021-02-02 PROCEDURE — 80076 HEPATIC FUNCTION PANEL: CPT

## 2021-02-02 PROCEDURE — 80047 BASIC METABLC PNL IONIZED CA: CPT

## 2021-02-02 PROCEDURE — G8420 CALC BMI NORM PARAMETERS: HCPCS | Performed by: INTERNAL MEDICINE

## 2021-02-02 PROCEDURE — G8484 FLU IMMUNIZE NO ADMIN: HCPCS | Performed by: INTERNAL MEDICINE

## 2021-02-02 PROCEDURE — G8427 DOCREV CUR MEDS BY ELIG CLIN: HCPCS | Performed by: INTERNAL MEDICINE

## 2021-02-02 PROCEDURE — 1090F PRES/ABSN URINE INCON ASSESS: CPT | Performed by: INTERNAL MEDICINE

## 2021-02-02 RX ORDER — CALCITRIOL 0.5 UG/1
1 CAPSULE, LIQUID FILLED ORAL DAILY
COMMUNITY
End: 2022-06-14

## 2021-02-03 LAB
ALBUMIN SERPL-MCNC: 4.2 G/DL (ref 3.5–5.1)
ALP BLD-CCNC: 82 U/L (ref 38–126)
ALT SERPL-CCNC: 11 U/L (ref 11–66)
AST SERPL-CCNC: 18 U/L (ref 5–40)
BILIRUB SERPL-MCNC: 0.3 MG/DL (ref 0.3–1.2)
BILIRUBIN DIRECT: < 0.2 MG/DL (ref 0–0.3)
CALCIUM SERPL-MCNC: 8 MG/DL (ref 8.5–10.5)
TOTAL PROTEIN: 6.9 G/DL (ref 6.1–8)

## 2021-03-23 ENCOUNTER — HOSPITAL ENCOUNTER (OUTPATIENT)
Dept: INFUSION THERAPY | Age: 66
Discharge: HOME | End: 2021-03-23
Payer: MEDICARE

## 2021-03-23 ENCOUNTER — OFFICE VISIT (OUTPATIENT)
Dept: ONCOLOGY | Age: 66
End: 2021-03-23
Payer: MEDICARE

## 2021-03-23 VITALS
SYSTOLIC BLOOD PRESSURE: 170 MMHG | OXYGEN SATURATION: 97 % | RESPIRATION RATE: 16 BRPM | BODY MASS INDEX: 21.89 KG/M2 | HEIGHT: 66 IN | HEART RATE: 81 BPM | DIASTOLIC BLOOD PRESSURE: 60 MMHG | WEIGHT: 136.2 LBS | TEMPERATURE: 97.3 F

## 2021-03-23 DIAGNOSIS — D64.9 CHRONIC ANEMIA: ICD-10-CM

## 2021-03-23 DIAGNOSIS — N18.4 CHRONIC RENAL INSUFFICIENCY, STAGE 4 (SEVERE) (HCC): ICD-10-CM

## 2021-03-23 DIAGNOSIS — C53.9 MALIGNANT NEOPLASM OF CERVIX, UNSPECIFIED SITE (HCC): Primary | ICD-10-CM

## 2021-03-23 DIAGNOSIS — E83.52 HYPERCALCEMIA: ICD-10-CM

## 2021-03-23 LAB
HCT VFR BLD CALC: 37.6 % (ref 37–47)
HEMOGLOBIN: 12 GM/DL (ref 12–16)
MCH RBC QN AUTO: 28.4 PG (ref 26–33)
MCHC RBC AUTO-ENTMCNC: 31.9 GM/DL (ref 32.2–35.5)
MCV RBC AUTO: 89 FL (ref 81–99)
PDW BLD-RTO: 17.2 % (ref 11.5–14.5)
PLATELET # BLD: 202 THOU/MM3 (ref 130–400)
PMV BLD AUTO: 8.6 FL (ref 9.4–12.4)
RBC # BLD: 4.23 MILL/MM3 (ref 4.2–5.4)
WBC # BLD: 5.4 THOU/MM3 (ref 4.8–10.8)

## 2021-03-23 PROCEDURE — 99211 OFF/OP EST MAY X REQ PHY/QHP: CPT

## 2021-03-23 PROCEDURE — 85027 COMPLETE CBC AUTOMATED: CPT

## 2021-03-23 PROCEDURE — 1090F PRES/ABSN URINE INCON ASSESS: CPT | Performed by: INTERNAL MEDICINE

## 2021-03-23 PROCEDURE — 36415 COLL VENOUS BLD VENIPUNCTURE: CPT

## 2021-03-23 PROCEDURE — G8427 DOCREV CUR MEDS BY ELIG CLIN: HCPCS | Performed by: INTERNAL MEDICINE

## 2021-03-23 PROCEDURE — G8484 FLU IMMUNIZE NO ADMIN: HCPCS | Performed by: INTERNAL MEDICINE

## 2021-03-23 PROCEDURE — 1036F TOBACCO NON-USER: CPT | Performed by: INTERNAL MEDICINE

## 2021-03-23 PROCEDURE — G8420 CALC BMI NORM PARAMETERS: HCPCS | Performed by: INTERNAL MEDICINE

## 2021-03-23 PROCEDURE — 3017F COLORECTAL CA SCREEN DOC REV: CPT | Performed by: INTERNAL MEDICINE

## 2021-03-23 PROCEDURE — 4040F PNEUMOC VAC/ADMIN/RCVD: CPT | Performed by: INTERNAL MEDICINE

## 2021-03-23 PROCEDURE — 99213 OFFICE O/P EST LOW 20 MIN: CPT | Performed by: INTERNAL MEDICINE

## 2021-03-23 PROCEDURE — 1123F ACP DISCUSS/DSCN MKR DOCD: CPT | Performed by: INTERNAL MEDICINE

## 2021-03-23 PROCEDURE — G8400 PT W/DXA NO RESULTS DOC: HCPCS | Performed by: INTERNAL MEDICINE

## 2021-03-23 NOTE — PROGRESS NOTES
[facility] CANCER CENTER  CANCER NETWORK OF [facility]  [facility] [address] [facility]  [address]  [address]  Dept: [phone]  Dept Fax: [phone]  Loc: [phone]    Subjective:      Chief Complaint:  René Dickey is a 76 y.o. female with cervical cancer. In June 2019, the patient began to develop a vague lower abdominal pain. This is associated sign and symptom progressively became worse to the point that it was severe. In July 2019 the patient developed vaginal bleeding which she describes also severe. This prompted her to present to the emergency department at [facility] in [place]. A pelvic ultrasound was completed which found fullness of the lower uterine segment. A follow-up CT scan of the abdomen pelvis did not show any area of lymphadenopathy or other intra-abdominal or pelvic findings. Eventually an MRI scan of the pelvis was completed which displayed a 5 cm cervical neoplasm with invasion of the parametrial and upper vagina region. There was noted obstruction of the endocervical canal with dilatation of the endometrial cavity. In context to this condition, the patient was then referred to [facility] gynecological oncology for further evaluation. Modifying factor affecting this condition was that on October 26, 2019 the patient underwent biopsy of the exocervix, endocervix, and endometrium. Surgical pathology confirmed squamous cell carcinoma. A follow-up PET scan then was completed which found intense abnormal metabolic activity associated with the primary malignancy but no evidence of metastatic spread of malignancy. 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 HPI:   Noemi Escobar is here today for follow-up regarding her history of hypercalcemia, cervical cancer, chronic renal failure, and chronic anemia.   On today's evaluation she states that she feels well and states that this is the best that she has felt for several months. Her anemia has improved and her hemoglobin is maintaining above 12. The patient has been receiving Procrit therapy at the time of her dialysis. In addition her hypercalcemia has remained normal since receiving a dose of Xgeva in January 2020. The patient has not had fever, cough, shortness of breath or other signs of infection. She complains of mild fatigue and generalized weakness that she relates to her dialysis. Her bowel habits have been normal without any blood in her stool. The patient is active with an ECOG performance status of level 0. PMH, SH, and FH:  I reviewed the patients medication list and allergy list as noted on the electronic medical record. The PMH, SH and FH were also reviewed as noted on the EMR. Review of Systems  Constitutional: Fatigue. HENT: Negative. Eyes: Negative. Respiratory: Negative. Cardiovascular: Negative. Gastrointestinal: Negative. Genitourinary: Negative. Musculoskeletal: Negative. Skin: Negative. Neurological: General weakness. Hematological: Negative. Psychiatric/Behavioral: Negative. Objective:   Physical Exam  Vitals:    03/23/21 1323   BP: (!) 170/60   Site: Right Upper Arm   Position: Sitting   Cuff Size: Medium Adult   Pulse: 81   Resp: 16   Temp: 97.3 °F (36.3 °C)   TempSrc: Tympanic   SpO2: 97%   Weight: 136 lb 3.2 oz (61.8 kg)   Height: 5' 5.98\" (1.676 m)   Vitals reviewed and are stable. Constitutional: Well-developed. No acute distress. HENT: Normocephalic and atraumatic. Eyes: PERRL. No scleral icterus. Neck: Overall appearance is symmetrical. No identifiable mass. Chest: Inspection and palpation of chest is normal.  Pulmonary: Effort normal. No respiratory distress. Cardiovascular: RRR. No edema in any of the four extremities. Abdominal: No hepatomegaly or splenomegaly.    Musculoskeletal: Gait is normal. Muscle strength and tone appear normal.  Neurological: Alert and oriented to person, place, and time. Judgment and thought content normal.  Skin: Skin appears warm and dry. Psychiatric: Mood and affect appropriate for the clinical situation. Data Analysis: The following laboratory studies were reviewed with the patient today:    Hematology 3/23/2021 2/2/2021 1/19/2021   WBC 5.4 4.7 (L) 3.6 (L)   RBC 4.23 4.25 3.72 (L)   HGB 12.0 12.1 10.9 (L)   HCT 37.6 37.7 34.2 (L)   MCV 89 89 92   RDW 17.2 (H) 15.5 (H) 15.8 (H)    304 287     Assessment:   1. 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 Hypercalcemia. 3.  Renal failure. 4.  Chronic anemia. Plan:   1. Monitor for recurrence of malignancy. 2.  Monitor hemoglobin/hematocrit and for any signs of blood loss. 3.  Monitor calcium level. 4.  Continue erythropoietin therapy as outlined by her nephrologist with dialysis. 5.  Follow-up with nephrology for continued management. Jocelyn Lane M.D. Medical Director: [facility]  Cancer Network [address] [facility], [address] [place] [address], [address] [place] [address], [address] [place] [address] of the [facility] at the [facility]      **This report has been created using voice recognition software. It may contain minor errors which are inherent in voice recognition technology. **

## 2021-07-13 ENCOUNTER — TELEPHONE (OUTPATIENT)
Dept: ONCOLOGY | Age: 66
End: 2021-07-13

## 2021-07-16 ENCOUNTER — HOSPITAL ENCOUNTER (OUTPATIENT)
Age: 66
Setting detail: OBSERVATION
LOS: 1 days | Discharge: HOSPICE | End: 2021-07-17
Attending: FAMILY MEDICINE | Admitting: FAMILY MEDICINE
Payer: MEDICARE

## 2021-07-16 DIAGNOSIS — R68.83 CHILLS: Primary | ICD-10-CM

## 2021-07-16 DIAGNOSIS — E83.52 HYPERCALCEMIA: ICD-10-CM

## 2021-07-16 PROBLEM — R73.9 HYPERGLYCEMIA: Status: ACTIVE | Noted: 2021-07-16

## 2021-07-16 LAB
ALBUMIN SERPL-MCNC: 4.1 G/DL (ref 3.5–5.1)
ALP BLD-CCNC: 79 U/L (ref 38–126)
ALT SERPL-CCNC: 12 U/L (ref 11–66)
ANION GAP SERPL CALCULATED.3IONS-SCNC: 10 MEQ/L (ref 8–16)
AST SERPL-CCNC: 19 U/L (ref 5–40)
BASOPHILS # BLD: 0.4 %
BASOPHILS ABSOLUTE: 0 THOU/MM3 (ref 0–0.1)
BILIRUB SERPL-MCNC: 0.5 MG/DL (ref 0.3–1.2)
BILIRUBIN DIRECT: < 0.2 MG/DL (ref 0–0.3)
BUN BLDV-MCNC: 7 MG/DL (ref 7–22)
CALCIUM SERPL-MCNC: 11.8 MG/DL (ref 8.5–10.5)
CHLORIDE BLD-SCNC: 96 MEQ/L (ref 98–111)
CO2: 29 MEQ/L (ref 23–33)
CREAT SERPL-MCNC: 3 MG/DL (ref 0.4–1.2)
EOSINOPHIL # BLD: 1.3 %
EOSINOPHILS ABSOLUTE: 0.1 THOU/MM3 (ref 0–0.4)
ERYTHROCYTE [DISTWIDTH] IN BLOOD BY AUTOMATED COUNT: 15.4 % (ref 11.5–14.5)
ERYTHROCYTE [DISTWIDTH] IN BLOOD BY AUTOMATED COUNT: 51.2 FL (ref 35–45)
GFR SERPL CREATININE-BSD FRML MDRD: 16 ML/MIN/1.73M2
GLUCOSE BLD-MCNC: 114 MG/DL (ref 70–108)
HCT VFR BLD CALC: 31.7 % (ref 37–47)
HEMOGLOBIN: 9.9 GM/DL (ref 12–16)
IMMATURE GRANS (ABS): 0.04 THOU/MM3 (ref 0–0.07)
IMMATURE GRANULOCYTES: 0.8 %
LYMPHOCYTES # BLD: 8.8 %
LYMPHOCYTES ABSOLUTE: 0.5 THOU/MM3 (ref 1–4.8)
MAGNESIUM: 2.1 MG/DL (ref 1.6–2.4)
MCH RBC QN AUTO: 29.3 PG (ref 26–33)
MCHC RBC AUTO-ENTMCNC: 31.2 GM/DL (ref 32.2–35.5)
MCV RBC AUTO: 93.8 FL (ref 81–99)
MONOCYTES # BLD: 13.1 %
MONOCYTES ABSOLUTE: 0.7 THOU/MM3 (ref 0.4–1.3)
NUCLEATED RED BLOOD CELLS: 0 /100 WBC
OSMOLALITY CALCULATION: 268.9 MOSMOL/KG (ref 275–300)
PHOSPHORUS: 3.4 MG/DL (ref 2.4–4.7)
PLATELET # BLD: 267 THOU/MM3 (ref 130–400)
PMV BLD AUTO: 9.9 FL (ref 9.4–12.4)
POTASSIUM SERPL-SCNC: 3.7 MEQ/L (ref 3.5–5.2)
PTH INTACT: 262.1 PG/ML (ref 15–65)
RBC # BLD: 3.38 MILL/MM3 (ref 4.2–5.4)
SEG NEUTROPHILS: 75.6 %
SEGMENTED NEUTROPHILS ABSOLUTE COUNT: 4 THOU/MM3 (ref 1.8–7.7)
SODIUM BLD-SCNC: 135 MEQ/L (ref 135–145)
TOTAL PROTEIN: 6.4 G/DL (ref 6.1–8)
TROPONIN T: 0.07 NG/ML
TROPONIN T: 0.08 NG/ML
TSH SERPL DL<=0.05 MIU/L-ACNC: 3.33 UIU/ML (ref 0.4–4.2)
URIC ACID: 3 MG/DL (ref 2.4–5.7)
VITAMIN D 25-HYDROXY: 46 NG/ML (ref 30–100)
WBC # BLD: 5.3 THOU/MM3 (ref 4.8–10.8)

## 2021-07-16 PROCEDURE — 85025 COMPLETE CBC W/AUTO DIFF WBC: CPT

## 2021-07-16 PROCEDURE — 93005 ELECTROCARDIOGRAM TRACING: CPT | Performed by: STUDENT IN AN ORGANIZED HEALTH CARE EDUCATION/TRAINING PROGRAM

## 2021-07-16 PROCEDURE — 82248 BILIRUBIN DIRECT: CPT

## 2021-07-16 PROCEDURE — G0378 HOSPITAL OBSERVATION PER HR: HCPCS

## 2021-07-16 PROCEDURE — 84550 ASSAY OF BLOOD/URIC ACID: CPT

## 2021-07-16 PROCEDURE — 99284 EMERGENCY DEPT VISIT MOD MDM: CPT

## 2021-07-16 PROCEDURE — 84443 ASSAY THYROID STIM HORMONE: CPT

## 2021-07-16 PROCEDURE — 84484 ASSAY OF TROPONIN QUANT: CPT

## 2021-07-16 PROCEDURE — 83970 ASSAY OF PARATHORMONE: CPT

## 2021-07-16 PROCEDURE — 83735 ASSAY OF MAGNESIUM: CPT

## 2021-07-16 PROCEDURE — 2580000003 HC RX 258: Performed by: STUDENT IN AN ORGANIZED HEALTH CARE EDUCATION/TRAINING PROGRAM

## 2021-07-16 PROCEDURE — 82306 VITAMIN D 25 HYDROXY: CPT

## 2021-07-16 PROCEDURE — 6370000000 HC RX 637 (ALT 250 FOR IP): Performed by: STUDENT IN AN ORGANIZED HEALTH CARE EDUCATION/TRAINING PROGRAM

## 2021-07-16 PROCEDURE — 80053 COMPREHEN METABOLIC PANEL: CPT

## 2021-07-16 PROCEDURE — 84100 ASSAY OF PHOSPHORUS: CPT

## 2021-07-16 PROCEDURE — 99219 PR INITIAL OBSERVATION CARE/DAY 50 MINUTES: CPT | Performed by: FAMILY MEDICINE

## 2021-07-16 PROCEDURE — 36415 COLL VENOUS BLD VENIPUNCTURE: CPT

## 2021-07-16 RX ORDER — M-VIT,TX,IRON,MINS/CALC/FOLIC 27MG-0.4MG
1 TABLET ORAL DAILY
Status: DISCONTINUED | OUTPATIENT
Start: 2021-07-17 | End: 2021-07-17 | Stop reason: HOSPADM

## 2021-07-16 RX ORDER — POLYETHYLENE GLYCOL 3350 17 G/17G
17 POWDER, FOR SOLUTION ORAL DAILY PRN
Status: DISCONTINUED | OUTPATIENT
Start: 2021-07-16 | End: 2021-07-17 | Stop reason: HOSPADM

## 2021-07-16 RX ORDER — TRAMADOL HYDROCHLORIDE 50 MG/1
50 TABLET ORAL EVERY 6 HOURS PRN
Status: DISCONTINUED | OUTPATIENT
Start: 2021-07-16 | End: 2021-07-17 | Stop reason: HOSPADM

## 2021-07-16 RX ORDER — HEPARIN SODIUM 5000 [USP'U]/ML
5000 INJECTION, SOLUTION INTRAVENOUS; SUBCUTANEOUS EVERY 8 HOURS
Status: DISCONTINUED | OUTPATIENT
Start: 2021-07-16 | End: 2021-07-17 | Stop reason: HOSPADM

## 2021-07-16 RX ORDER — ONDANSETRON 2 MG/ML
4 INJECTION INTRAMUSCULAR; INTRAVENOUS EVERY 6 HOURS PRN
Status: DISCONTINUED | OUTPATIENT
Start: 2021-07-16 | End: 2021-07-17 | Stop reason: HOSPADM

## 2021-07-16 RX ORDER — CINACALCET 30 MG/1
30 TABLET, FILM COATED ORAL DAILY
COMMUNITY
End: 2021-09-14 | Stop reason: ALTCHOICE

## 2021-07-16 RX ORDER — HYDRALAZINE HYDROCHLORIDE 50 MG/1
50 TABLET, FILM COATED ORAL 3 TIMES DAILY
Status: DISCONTINUED | OUTPATIENT
Start: 2021-07-16 | End: 2021-07-17 | Stop reason: HOSPADM

## 2021-07-16 RX ORDER — CALCITRIOL 0.25 UG/1
1 CAPSULE, LIQUID FILLED ORAL DAILY
Status: DISCONTINUED | OUTPATIENT
Start: 2021-07-17 | End: 2021-07-17

## 2021-07-16 RX ORDER — ALPRAZOLAM 0.5 MG/1
0.5 TABLET ORAL
COMMUNITY
End: 2022-06-14

## 2021-07-16 RX ORDER — ACETAMINOPHEN 650 MG/1
650 SUPPOSITORY RECTAL EVERY 6 HOURS PRN
Status: DISCONTINUED | OUTPATIENT
Start: 2021-07-16 | End: 2021-07-17 | Stop reason: HOSPADM

## 2021-07-16 RX ORDER — SODIUM CHLORIDE 0.9 % (FLUSH) 0.9 %
5-40 SYRINGE (ML) INJECTION EVERY 12 HOURS SCHEDULED
Status: DISCONTINUED | OUTPATIENT
Start: 2021-07-16 | End: 2021-07-17 | Stop reason: HOSPADM

## 2021-07-16 RX ORDER — MINOXIDIL 2.5 MG/1
2.5 TABLET ORAL 2 TIMES DAILY
COMMUNITY

## 2021-07-16 RX ORDER — ONDANSETRON 4 MG/1
4 TABLET, ORALLY DISINTEGRATING ORAL EVERY 8 HOURS PRN
Status: DISCONTINUED | OUTPATIENT
Start: 2021-07-16 | End: 2021-07-17 | Stop reason: HOSPADM

## 2021-07-16 RX ORDER — NIFEDIPINE 30 MG/1
30 TABLET, EXTENDED RELEASE ORAL DAILY
Status: DISCONTINUED | OUTPATIENT
Start: 2021-07-17 | End: 2021-07-17 | Stop reason: HOSPADM

## 2021-07-16 RX ORDER — ACETAMINOPHEN 325 MG/1
650 TABLET ORAL EVERY 6 HOURS PRN
Status: DISCONTINUED | OUTPATIENT
Start: 2021-07-16 | End: 2021-07-17 | Stop reason: HOSPADM

## 2021-07-16 RX ORDER — SODIUM CHLORIDE 9 MG/ML
25 INJECTION, SOLUTION INTRAVENOUS PRN
Status: DISCONTINUED | OUTPATIENT
Start: 2021-07-16 | End: 2021-07-17 | Stop reason: HOSPADM

## 2021-07-16 RX ORDER — HYDROXYZINE HYDROCHLORIDE 25 MG/1
25 TABLET, FILM COATED ORAL 3 TIMES DAILY PRN
Status: DISCONTINUED | OUTPATIENT
Start: 2021-07-16 | End: 2021-07-17 | Stop reason: HOSPADM

## 2021-07-16 RX ORDER — LEVOTHYROXINE SODIUM 0.12 MG/1
125 TABLET ORAL DAILY
Status: DISCONTINUED | OUTPATIENT
Start: 2021-07-17 | End: 2021-07-17 | Stop reason: HOSPADM

## 2021-07-16 RX ORDER — ASPIRIN 81 MG/1
81 TABLET ORAL DAILY
Status: DISCONTINUED | OUTPATIENT
Start: 2021-07-17 | End: 2021-07-17 | Stop reason: HOSPADM

## 2021-07-16 RX ORDER — SODIUM CHLORIDE 0.9 % (FLUSH) 0.9 %
5-40 SYRINGE (ML) INJECTION PRN
Status: DISCONTINUED | OUTPATIENT
Start: 2021-07-16 | End: 2021-07-17 | Stop reason: HOSPADM

## 2021-07-16 RX ORDER — 0.9 % SODIUM CHLORIDE 0.9 %
500 INTRAVENOUS SOLUTION INTRAVENOUS ONCE
Status: COMPLETED | OUTPATIENT
Start: 2021-07-16 | End: 2021-07-16

## 2021-07-16 RX ADMIN — TRAMADOL HYDROCHLORIDE 50 MG: 50 TABLET, FILM COATED ORAL at 23:29

## 2021-07-16 RX ADMIN — SODIUM CHLORIDE 500 ML: 9 INJECTION, SOLUTION INTRAVENOUS at 20:13

## 2021-07-16 ASSESSMENT — ENCOUNTER SYMPTOMS
ABDOMINAL PAIN: 1
SHORTNESS OF BREATH: 0
VOMITING: 1
NAUSEA: 1
DIARRHEA: 0
BACK PAIN: 1
CONSTIPATION: 1
COUGH: 0
EYE PAIN: 0

## 2021-07-16 ASSESSMENT — PAIN DESCRIPTION - PAIN TYPE
TYPE: CHRONIC PAIN
TYPE: CHRONIC PAIN

## 2021-07-16 ASSESSMENT — PAIN SCALES - GENERAL
PAINLEVEL_OUTOF10: 4
PAINLEVEL_OUTOF10: 9

## 2021-07-16 ASSESSMENT — PAIN DESCRIPTION - DESCRIPTORS
DESCRIPTORS: ACHING;CONSTANT
DESCRIPTORS: ACHING;CONSTANT

## 2021-07-16 ASSESSMENT — PAIN DESCRIPTION - LOCATION
LOCATION: GENERALIZED
LOCATION: GENERALIZED

## 2021-07-16 NOTE — Clinical Note
Patient Class: Inpatient [101]   REQUIRED: Diagnosis: Hyperglycemia [038727]   Estimated Length of Stay: Estimated stay of more than 2 midnights   Admitting Provider: Reji Navarrete [[id number]]   Telemetry/Cardiac Monitoring Required?: Yes

## 2021-07-16 NOTE — ED PROVIDER NOTES
[facility] ENCOUNTER          Pt Name: Jesus Hathaway  MRN: [id number]  [place] 1955  Date of evaluation: 7/16/2021  Treating Resident Physician: Deuce Samuel MD  Supervising Physician: Dr. Yoav Xiong       Chief Complaint   Patient presents with    Generalized Body Aches    Fatigue    Abnormal Test Results     high calcium     History obtained from chart review and the patient. HISTORY OF PRESENT ILLNESS    HPI  Jesus Hathaway is a 76 y.o. female who presents to the emergency department for evaluation of hypercalcemia. Was noted to be 12.6 on labs today and told to go to ER for further evaluation. Is dialysis patient  2/2 cervical cancer. MWF dialysis and did complete today. Does have fistula. Says fatigue, chills, and diffuse myalgias for 5 weeks now. Was taking oral medications, appears to be calcitriol, but had worsening of nausea from this. Did have daily vomiting. Denying any fevers, headaches, shortness of air, chest pain, or diarrhea or signs of infection around fistula. Per Dr. Shasha Galvan note, to get bisphosp injection (xgeva). Dr. Bhavna Samaniego reportedly asked her to come for further eval.  He has reportedly been managing and investigating the hypercalcemia. The patient has no other acute complaints at this time. REVIEW OF SYSTEMS   Review of Systems   Constitutional: Positive for appetite change. Negative for fever and unexpected weight change. HENT: Negative for congestion and hearing loss. Eyes: Negative for pain and visual disturbance. Respiratory: Negative for cough and shortness of breath. Cardiovascular: Negative for chest pain and leg swelling. Gastrointestinal: Positive for abdominal pain, constipation, nausea and vomiting. Negative for diarrhea. Genitourinary: Positive for difficulty urinating. Negative for flank pain.    Musculoskeletal: Positive for arthralgias, back pain and myalgias. Neurological: Positive for dizziness, weakness and headaches. Negative for numbness. Psychiatric/Behavioral: Positive for behavioral problems, decreased concentration and sleep disturbance. Negative for suicidal ideas.          PAST MEDICAL AND SURGICAL HISTORY     Past Medical History:   Diagnosis Date    Anxiety     Diarrhea     Dryness of periwound skin     Gout, joint     Hypertension     Kidney disease     Mouth dryness      Past Surgical History:   Procedure Laterality Date    AV FISTULA CREATION Left     URETER STENT PLACEMENT  11/2019         MEDICATIONS     Current Facility-Administered Medications:     0.9 % sodium chloride bolus, 500 mL, Intravenous, Once, Emily Erickson MD    Current Outpatient Medications:     calcitRIOL (ROCALTROL) 0.5 MCG capsule, Take 1 mcg by mouth daily, Disp: , Rfl:     b complex vitamins capsule, Take 1 capsule by mouth daily, Disp: , Rfl:     hydrOXYzine (ATARAX) 25 MG tablet, Take 25 mg by mouth 3 times daily as needed for Itching, Disp: , Rfl:     hydrALAZINE (APRESOLINE) 100 MG tablet, Take 50 mg by mouth 3 times daily , Disp: , Rfl:     vitamin D (ERGOCALCIFEROL) 1.25 MG (95408 UT) CAPS capsule, Take 50,000 Units by mouth once a week, Disp: , Rfl:     traMADol (ULTRAM) 50 MG tablet, Take 50 mg by mouth every 6 hours as needed for Pain., Disp: , Rfl:     aspirin 81 MG EC tablet, Take 81 mg by mouth daily, Disp: , Rfl:     docusate sodium (COLACE) 100 MG capsule, Take 100 mg by mouth, Disp: , Rfl:     acetaminophen (TYLENOL) 325 MG tablet, Take by mouth, Disp: , Rfl:     NIFEdipine (ADALAT CC) 30 MG extended release tablet, TAKE 1 TABLET BY MOUTH ONCE DAILY FOR 2 DAYS ON AN EMPTY STOMACH THEN INCREASE TO TWICE DAILY, Disp: , Rfl: 3    levothyroxine (SYNTHROID) 125 MCG tablet, Take 125 mcg by mouth, Disp: , Rfl:     temazepam (RESTORIL) 30 MG capsule, TAKE 1 CAPSULE BY MOUTH ONCE DAILY AT BEDTIME AS NEEDED FOR SLEEP, Disp: , Rfl: 2      SOCIAL HISTORY     Social History     Social History Narrative    Not on file     Social History     Tobacco Use    Smoking status: Never Smoker    Smokeless tobacco: Never Used   Substance Use Topics    Alcohol use: Not Currently    Drug use: Never         ALLERGIES     Allergies   Allergen Reactions    Dye [Iodides] Swelling and Rash         FAMILY HISTORY     Family History   Problem Relation Age of Onset    Cancer Father     No Known Problems Sister     No Known Problems Brother          PREVIOUS RECORDS   Previous records reviewed: reviewed and relevant as in HPI. PHYSICAL EXAM     ED Triage Vitals   BP Temp Temp src Pulse Resp SpO2 Height Weight   07/16/21 1730 -- -- 07/16/21 1728 07/16/21 1728 07/16/21 1728 07/16/21 1728 07/16/21 1728   (!) 178/72   70 16 97 % 5' 6\" (1.676 m) 130 lb (59 kg)     Initial vital signs and nursing assessment reviewed and abnormal from hypertensive to 654 systolic, otherwise normal. Pulsoximetry is normal per my interpretation. Additional Vital Signs:  Vitals:    07/16/21 1939   BP: (!) 174/67   Pulse: 66   Resp: 16   SpO2: 94%       Physical Exam  Vitals and nursing note reviewed. Constitutional:       General: She is not in acute distress. HENT:      Head: Normocephalic and atraumatic. Nose: Nose normal.      Mouth/Throat:      Mouth: Mucous membranes are moist.      Pharynx: Oropharynx is clear. Eyes:      Extraocular Movements: Extraocular movements intact. Pupils: Pupils are equal, round, and reactive to light. Cardiovascular:      Rate and Rhythm: Normal rate and regular rhythm. Pulses: Normal pulses. Heart sounds: Normal heart sounds. Pulmonary:      Effort: Pulmonary effort is normal.      Breath sounds: Normal breath sounds. Abdominal:      Palpations: Abdomen is soft. Tenderness: There is no abdominal tenderness. Musculoskeletal:         General: No signs of injury. Normal range of motion.       Cervical back: Normal range of motion and neck supple. Right lower leg: Edema present. Left lower leg: Edema present. Skin:     General: Skin is warm and dry. Capillary Refill: Capillary refill takes less than 2 seconds. Neurological:      General: No focal deficit present. Mental Status: She is alert and oriented to person, place, and time. Mental status is at baseline. Motor: Weakness present. Psychiatric:         Mood and Affect: Mood normal.         Behavior: Behavior normal.             MEDICAL DECISION MAKING   Initial Assessment: Seen and evaluate in ER for diffuse muscle aches and elevated calcium. Was in no acute distress and vitals were stable. Was interacting well, but was slightly slow to respond. AOx3. With history of hypercalcemia and dialysis patient with these symptoms, will likely need admission for further treatment. No current signs of ACS, infection, or stroke.     Plan: CBC, CMP, ionized calcium, EKG, troponin, PTH, vit D, mag, phos, and give small fluid bolus and then reevaluate         ED RESULTS   Laboratory results:  Labs Reviewed   CBC WITH AUTO DIFFERENTIAL - Abnormal; Notable for the following components:       Result Value    RBC 3.38 (*)     Hemoglobin 9.9 (*)     Hematocrit 31.7 (*)     MCHC 31.2 (*)     RDW-CV 15.4 (*)     RDW-SD 51.2 (*)     Lymphocytes Absolute 0.5 (*)     All other components within normal limits   BASIC METABOLIC PANEL - Abnormal; Notable for the following components:    Chloride 96 (*)     Glucose 114 (*)     CREATININE 3.0 (*)     Calcium 11.8 (*)     All other components within normal limits   GLOMERULAR FILTRATION RATE, ESTIMATED - Abnormal; Notable for the following components:    Est, Glom Filt Rate 16 (*)     All other components within normal limits   OSMOLALITY - Abnormal; Notable for the following components:    Osmolality Calc 268.9 (*)     All other components within normal limits   ANION GAP   TROPONIN   CALCIUM, IONIZED   PTH, INTACT   VITAMIN D 25 HYDROXY   MAGNESIUM   PHOSPHORUS   URIC ACID   HEPATIC FUNCTION PANEL       Radiologic studies results:  No orders to display       ED Medications administered this visit:   Medications   0.9 % sodium chloride bolus (has no administration in time range)         ED COURSE          Seen and evaluated in ED in stable condition. Ordered above labs which confirmed hypercalcemia. Discussed case with Dr. Danny Zheng who agreed with plan of care. EKG did show new afib, discussed with patient who is not on blood thinners. Is okay with starting heparin for this. Consulted hospitalist for admission, who agreed. MEDICATION CHANGES     New Prescriptions    No medications on file         FINAL DISPOSITION     Final diagnoses:   Chills   Hypercalcemia     Condition: condition: stable  Dispo: Admit to telemetry      This transcription was electronically signed. Parts of this transcriptions may have been dictated by use of voice recognition software and electronically transcribed, and parts may have been transcribed with the assistance of an ED scribe. The transcription may contain errors not detected in proofreading. Please refer to my supervising physician's documentation if my documentation differs.     Electronically Signed: Kishor Blackwell MD, 07/16/21, 7:57 PM       Kishor Blackwell MD  Resident  07/16/21 2261       Kishor Blackwell MD  Resident  07/16/21 2045

## 2021-07-16 NOTE — ED TRIAGE NOTES
Pt presents to the ED via ED lobby with c/o generalized body aches and weakness. Pt is a dialysis pt.  Pt also states she had blood work today which showed elevated calcium

## 2021-07-17 VITALS
BODY MASS INDEX: 20.51 KG/M2 | HEIGHT: 66 IN | OXYGEN SATURATION: 90 % | TEMPERATURE: 97.7 F | HEART RATE: 72 BPM | SYSTOLIC BLOOD PRESSURE: 187 MMHG | RESPIRATION RATE: 19 BRPM | WEIGHT: 127.65 LBS | DIASTOLIC BLOOD PRESSURE: 81 MMHG

## 2021-07-17 LAB
ANION GAP SERPL CALCULATED.3IONS-SCNC: 11 MEQ/L (ref 8–16)
BUN BLDV-MCNC: 9 MG/DL (ref 7–22)
CALCIUM IONIZED: 1.45 MMOL/L (ref 1.12–1.32)
CALCIUM IONIZED: 1.5 MMOL/L (ref 1.12–1.32)
CALCIUM SERPL-MCNC: 11.1 MG/DL (ref 8.5–10.5)
CALCIUM SERPL-MCNC: 11.9 MG/DL (ref 8.5–10.5)
CHLORIDE BLD-SCNC: 98 MEQ/L (ref 98–111)
CO2: 26 MEQ/L (ref 23–33)
CREAT SERPL-MCNC: 3.5 MG/DL (ref 0.4–1.2)
EKG Q-T INTERVAL: 378 MS
EKG QRS DURATION: 86 MS
EKG QTC CALCULATION (BAZETT): 399 MS
EKG R AXIS: 1 DEGREES
EKG T AXIS: 71 DEGREES
EKG VENTRICULAR RATE: 67 BPM
ERYTHROCYTE [DISTWIDTH] IN BLOOD BY AUTOMATED COUNT: 15.3 % (ref 11.5–14.5)
ERYTHROCYTE [DISTWIDTH] IN BLOOD BY AUTOMATED COUNT: 51.5 FL (ref 35–45)
FLU A ANTIGEN: NEGATIVE
FLU B ANTIGEN: NEGATIVE
GFR SERPL CREATININE-BSD FRML MDRD: 13 ML/MIN/1.73M2
GLUCOSE BLD-MCNC: 82 MG/DL (ref 70–108)
HCT VFR BLD CALC: 28.6 % (ref 37–47)
HEMOGLOBIN: 8.9 GM/DL (ref 12–16)
HEPATITIS B SURFACE ANTIGEN: NEGATIVE
MAGNESIUM: 2.2 MG/DL (ref 1.6–2.4)
MCH RBC QN AUTO: 29.7 PG (ref 26–33)
MCHC RBC AUTO-ENTMCNC: 31.1 GM/DL (ref 32.2–35.5)
MCV RBC AUTO: 95.3 FL (ref 81–99)
PLATELET # BLD: 222 THOU/MM3 (ref 130–400)
PMV BLD AUTO: 9.9 FL (ref 9.4–12.4)
POTASSIUM REFLEX MAGNESIUM: 3.4 MEQ/L (ref 3.5–5.2)
RBC # BLD: 3 MILL/MM3 (ref 4.2–5.4)
SARS-COV-2, NAAT: NOT DETECTED
SODIUM BLD-SCNC: 135 MEQ/L (ref 135–145)
WBC # BLD: 4.3 THOU/MM3 (ref 4.8–10.8)

## 2021-07-17 PROCEDURE — 90935 HEMODIALYSIS ONE EVALUATION: CPT

## 2021-07-17 PROCEDURE — 82330 ASSAY OF CALCIUM: CPT

## 2021-07-17 PROCEDURE — G0378 HOSPITAL OBSERVATION PER HR: HCPCS

## 2021-07-17 PROCEDURE — 99203 OFFICE O/P NEW LOW 30 MIN: CPT | Performed by: INTERNAL MEDICINE

## 2021-07-17 PROCEDURE — 96374 THER/PROPH/DIAG INJ IV PUSH: CPT

## 2021-07-17 PROCEDURE — 2580000003 HC RX 258: Performed by: INTERNAL MEDICINE

## 2021-07-17 PROCEDURE — 82310 ASSAY OF CALCIUM: CPT

## 2021-07-17 PROCEDURE — 2580000003 HC RX 258: Performed by: STUDENT IN AN ORGANIZED HEALTH CARE EDUCATION/TRAINING PROGRAM

## 2021-07-17 PROCEDURE — 6370000000 HC RX 637 (ALT 250 FOR IP): Performed by: STUDENT IN AN ORGANIZED HEALTH CARE EDUCATION/TRAINING PROGRAM

## 2021-07-17 PROCEDURE — 6360000002 HC RX W HCPCS: Performed by: STUDENT IN AN ORGANIZED HEALTH CARE EDUCATION/TRAINING PROGRAM

## 2021-07-17 PROCEDURE — 36415 COLL VENOUS BLD VENIPUNCTURE: CPT

## 2021-07-17 PROCEDURE — 96372 THER/PROPH/DIAG INJ SC/IM: CPT

## 2021-07-17 PROCEDURE — 80048 BASIC METABOLIC PNL TOTAL CA: CPT

## 2021-07-17 PROCEDURE — 87804 INFLUENZA ASSAY W/OPTIC: CPT

## 2021-07-17 PROCEDURE — 87340 HEPATITIS B SURFACE AG IA: CPT

## 2021-07-17 PROCEDURE — 96375 TX/PRO/DX INJ NEW DRUG ADDON: CPT

## 2021-07-17 PROCEDURE — 6370000000 HC RX 637 (ALT 250 FOR IP): Performed by: INTERNAL MEDICINE

## 2021-07-17 PROCEDURE — 87635 SARS-COV-2 COVID-19 AMP PRB: CPT

## 2021-07-17 PROCEDURE — 85027 COMPLETE CBC AUTOMATED: CPT

## 2021-07-17 PROCEDURE — 99217 PR OBSERVATION CARE DISCHARGE MANAGEMENT: CPT | Performed by: INTERNAL MEDICINE

## 2021-07-17 PROCEDURE — 93010 ELECTROCARDIOGRAM REPORT: CPT | Performed by: NUCLEAR MEDICINE

## 2021-07-17 PROCEDURE — 6360000002 HC RX W HCPCS: Performed by: INTERNAL MEDICINE

## 2021-07-17 PROCEDURE — 83735 ASSAY OF MAGNESIUM: CPT

## 2021-07-17 RX ORDER — MINOXIDIL 2.5 MG/1
2.5 TABLET ORAL 2 TIMES DAILY
Status: DISCONTINUED | OUTPATIENT
Start: 2021-07-17 | End: 2021-07-17 | Stop reason: HOSPADM

## 2021-07-17 RX ORDER — HYDRALAZINE HYDROCHLORIDE 20 MG/ML
20 INJECTION INTRAMUSCULAR; INTRAVENOUS EVERY 4 HOURS PRN
Status: DISCONTINUED | OUTPATIENT
Start: 2021-07-17 | End: 2021-07-17 | Stop reason: HOSPADM

## 2021-07-17 RX ORDER — SODIUM CHLORIDE 9 MG/ML
INJECTION, SOLUTION INTRAVENOUS CONTINUOUS
Status: DISCONTINUED | OUTPATIENT
Start: 2021-07-17 | End: 2021-07-17 | Stop reason: HOSPADM

## 2021-07-17 RX ORDER — LORAZEPAM 0.5 MG/1
0.5 TABLET ORAL NIGHTLY PRN
Refills: 2 | Status: DISCONTINUED | OUTPATIENT
Start: 2021-07-17 | End: 2021-07-17 | Stop reason: HOSPADM

## 2021-07-17 RX ORDER — CINACALCET 30 MG/1
180 TABLET, FILM COATED ORAL ONCE
Status: COMPLETED | OUTPATIENT
Start: 2021-07-17 | End: 2021-07-17

## 2021-07-17 RX ORDER — SODIUM CHLORIDE 9 MG/ML
INJECTION, SOLUTION INTRAVENOUS CONTINUOUS
Status: DISCONTINUED | OUTPATIENT
Start: 2021-07-17 | End: 2021-07-17

## 2021-07-17 RX ORDER — CINACALCET 30 MG/1
30 TABLET, FILM COATED ORAL DAILY
Status: DISCONTINUED | OUTPATIENT
Start: 2021-07-17 | End: 2021-07-17

## 2021-07-17 RX ADMIN — Medication 1 TABLET: at 09:20

## 2021-07-17 RX ADMIN — HYDRALAZINE HYDROCHLORIDE 20 MG: 20 INJECTION INTRAMUSCULAR; INTRAVENOUS at 18:36

## 2021-07-17 RX ADMIN — LEVOTHYROXINE SODIUM 125 MCG: 0.12 TABLET ORAL at 06:28

## 2021-07-17 RX ADMIN — ASPIRIN 81 MG: 81 TABLET, COATED ORAL at 09:20

## 2021-07-17 RX ADMIN — SODIUM CHLORIDE: 9 INJECTION, SOLUTION INTRAVENOUS at 14:16

## 2021-07-17 RX ADMIN — TRAMADOL HYDROCHLORIDE 50 MG: 50 TABLET, FILM COATED ORAL at 14:28

## 2021-07-17 RX ADMIN — HEPARIN SODIUM 5000 UNITS: 5000 INJECTION INTRAVENOUS; SUBCUTANEOUS at 00:06

## 2021-07-17 RX ADMIN — CINACALCET HYDROCHLORIDE 180 MG: 30 TABLET, FILM COATED ORAL at 14:17

## 2021-07-17 RX ADMIN — HEPARIN SODIUM 5000 UNITS: 5000 INJECTION INTRAVENOUS; SUBCUTANEOUS at 06:28

## 2021-07-17 RX ADMIN — HYDRALAZINE HYDROCHLORIDE 50 MG: 50 TABLET, FILM COATED ORAL at 09:20

## 2021-07-17 RX ADMIN — HYDRALAZINE HYDROCHLORIDE 50 MG: 50 TABLET, FILM COATED ORAL at 00:06

## 2021-07-17 RX ADMIN — TRAMADOL HYDROCHLORIDE 50 MG: 50 TABLET, FILM COATED ORAL at 06:33

## 2021-07-17 RX ADMIN — NIFEDIPINE 30 MG: 30 TABLET, FILM COATED, EXTENDED RELEASE ORAL at 09:20

## 2021-07-17 RX ADMIN — MINOXIDIL 2.5 MG: 2.5 TABLET ORAL at 09:26

## 2021-07-17 RX ADMIN — SODIUM CHLORIDE, PRESERVATIVE FREE 10 ML: 5 INJECTION INTRAVENOUS at 00:07

## 2021-07-17 RX ADMIN — ONDANSETRON 4 MG: 2 INJECTION INTRAMUSCULAR; INTRAVENOUS at 16:27

## 2021-07-17 RX ADMIN — HYDRALAZINE HYDROCHLORIDE 50 MG: 50 TABLET, FILM COATED ORAL at 15:44

## 2021-07-17 RX ADMIN — CINACALCET HYDROCHLORIDE 30 MG: 30 TABLET, COATED ORAL at 09:29

## 2021-07-17 ASSESSMENT — PAIN DESCRIPTION - FREQUENCY: FREQUENCY: CONTINUOUS

## 2021-07-17 ASSESSMENT — PAIN SCALES - GENERAL
PAINLEVEL_OUTOF10: 7
PAINLEVEL_OUTOF10: 8
PAINLEVEL_OUTOF10: 7
PAINLEVEL_OUTOF10: 7

## 2021-07-17 ASSESSMENT — ENCOUNTER SYMPTOMS
NAUSEA: 0
BACK PAIN: 0
SHORTNESS OF BREATH: 0
COUGH: 0
ABDOMINAL PAIN: 0
VOMITING: 0

## 2021-07-17 ASSESSMENT — PAIN DESCRIPTION - PAIN TYPE: TYPE: CHRONIC PAIN

## 2021-07-17 ASSESSMENT — PAIN DESCRIPTION - PROGRESSION: CLINICAL_PROGRESSION: NOT CHANGED

## 2021-07-17 ASSESSMENT — PAIN DESCRIPTION - LOCATION: LOCATION: GENERALIZED

## 2021-07-17 ASSESSMENT — PAIN DESCRIPTION - ONSET: ONSET: ON-GOING

## 2021-07-17 ASSESSMENT — PAIN DESCRIPTION - DESCRIPTORS: DESCRIPTORS: ACHING

## 2021-07-17 NOTE — FLOWSHEET NOTE
07/17/21 1654   Provider Notification   Reason for Communication Critical Value (comment)   Provider Name Dr. Abelardo Garzon   Provider Notification Physician   Method of Communication Secure Message   Notification Time [phone]     I have her the 180 of sensipar @ [phone] and she became nauseous and started vomitting. BP now higher than it has been: ~215/90 consistently for past 30 minutes. Do you want anything PRN? Dr. Abelardo Garzon responded: 20 IV Hydralazine q4 hr for SBP >160.

## 2021-07-17 NOTE — H&P
History & Physical        Patient:  Zain Vergara  YOB: 1955    MRN: [id number]     Acct: [de-identified]    PCP: Mindy Dao    Date of Admission: 7/16/2021    Date of Service: Pt seen/examined on 07/16/21  and Admitted to Observation with expected LOS less than two midnights due to medical therapy. 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 Patient states that over the past month she has noticed that her calcium level has gradually increased and she has began having her usual symptoms with hypercalcemia including weakness, fatigue, poor appetite, and palpitations. Patient does have elevated PTH which has been elevated since 7/2020. She also has an elevated PTH related peptide. There are relatively few cases of described PTH secretion from squamous cell cancer. However due to ESRD patient is unable to take bisphosphonates. She does very well with Xgeva outpatient and does take cinacalcet on a daily basis. -Patient arrived for [employer] administration    -Xgeva not on formulary, obtaining medication from local cancer center   -Nephrology consulted, question if she sees Dr. Tay Plascencia   -Patient placed in observation with BMP to run tomorrow   -IVF running   -Patient did receive dialysis yesterday   -Patient does take cinacalcet outpatient, continue   -Patient set to see Dr. Rohit Campo on Tuesday   -Continuing patient's Ultram inpatient for muscle aches and pains    2.)  Squamous cell cancer of the cervix, stage IIb: Sees Dr. Rohit Campo outpatient for continued medical management and therapy. Set to see on Tuesday 7/20. Continue with home pain medications of Ultram.    3.)  Acute on chronic normocytic anemia 2/2 ESRD/cancer: Hgb on arrival 9.9, baseline around 11.0.   Patient does receive EPO injections outpatient as needed based on nephrology recommendations. Continue follow-up outpatient. 4.)  ESRD on HD, M/W/F: Patient received dialysis on Thursday this week due to outpatient clinic rescheduling. Patient states no current issues with HD, has a permanent fistula in place with no port. Continue following outpatient with nephrologist.    5.)  Palpitations 2/2 junctional rhythm with PACs: Patient reports palpitations occurring with hypercalcemia, likely related to junctional rhythm while hypercalcemic. No clinical signs of A. Fib, read on EKG. If patient continues to have palpitations, consider a Holter monitor outpatient to evaluate for true A. fib movements. At this moment, no indication for anticoagulation. 6.)  Elevated troponin 2/2 demand ischemia: Troponin elevated to 0.079 on admission, no prior troponin to compare to. EKG does not show any ST changes and the patient does not state any chest pain. Repeat troponin 0.068    7.)  Hypothyroidism: Continue patient's levothyroxine 125 mcg daily. Since patient was having muscle weakness, TSH was checked, normal at 3.33.    8.)  Primary HTN: Patient mildly hypertensive on arrival, likely secondary due to pain. Continuing patient's aspirin, hydralazine, minoxidil, and nifedipine inpatient. 9.)  History of anxiety: Continue patient's Atarax and Restoril inpatient. Patient only takes Xanax before HD.    10.)  Heart Murmur: Patient does have a heart murmur on exam and she has been told she has a heart murmur in the past.  No echo in chart to review potential stenosis or regurgitation of heart valves. Heart murmur sounds similar to aortic stenosis, continue to follow-up out patient. DVT prophylaxis: Patient on SQ heparin while inpatient. History Of Present Illness:       Ms. Karrie Kapadia is a 54-year-old female with a past medical history of cervical cancer, HTN, ESRD on HD, and anxiety.  She presented to the ER at the request of her nephrologist in order to receive a dose of Xgeva due to hypercalcemia. The patient states that over the past year her calcium levels have been gradually increasing and while she was first on a pill to decrease calcium, that gradually became ineffective. In January 2021, she was given a dose of Xgeva with good response in her calcium level. The patient states that other injectable medications do not work well for lowering her calcium level. Patient states that in the past month her calcium levels have began rising again with the usual presenting symptoms for her being weakness, muscle fatigue, shortness of breath, low appetite, as well as palpitations. She states that her nephrologist wanted her to come in for an overnight stay just received the shot so she could be released in the morning. She also states that due to appointment changes, she actually had to receive dialysis on Thursday and not on Friday. Her normal dialysis routine is Monday, Wednesday, and Friday. She does have a fistula in place. She does see Dr. Hayley Julio outpatient to follow-up with her cervical cancer. He is also recommending that she receives Xgeva for her hypercalcemia. The patient currently denies other issues such as nausea, vomiting, diarrhea, or syncope. In the ED /67, HR 66, RR 16, SPO2 94% on room air, temperature 98 °F. Sodium 135, potassium 3.7, chloride 96, creatinine 3.0, magnesium 2.1, calcium 11.8 (reported as 12.8 just yesterday), troponin 0.079, LFTs in normal limits, .1, vitamin D 46, WBC 5.3, Hgb 9.9, , EKG shows junctional rhythm without A. fib.     Past Medical History:          Diagnosis Date    Anxiety     Diarrhea     Dryness of periwound skin     Gout, joint     Hypertension     Kidney disease     Mouth dryness        Past Surgical History:          Procedure Laterality Date    AV FISTULA CREATION Left     URETER STENT PLACEMENT  11/2019       Medications Prior to Admission:      Prior to Admission medications    Medication Sig Start Date End Date Taking? Authorizing Provider   cinacalcet (SENSIPAR) 30 MG tablet Take 30 mg by mouth daily   Yes Historical Provider, MD   minoxidil (LONITEN) 2.5 MG tablet Take 2.5 mg by mouth 2 times daily   Yes Historical Provider, MD   ALPRAZolam (XANAX) 0.5 MG tablet Take 0.5 mg by mouth three times a week. Before dialysis   Yes Historical Provider, MD   bisacodyl (DULCOLAX) 5 MG EC tablet Take 5 mg by mouth daily as needed for Constipation   Yes Historical Provider, MD   b complex vitamins capsule Take 1 capsule by mouth daily   Yes Historical Provider, MD   hydrOXYzine (ATARAX) 25 MG tablet Take 50 mg by mouth 3 times daily as needed for Itching    Yes Historical Provider, MD   hydrALAZINE (APRESOLINE) 100 MG tablet Take 50 mg by mouth 3 times daily    Yes Historical Provider, MD   vitamin D (ERGOCALCIFEROL) 1.25 MG (25570 UT) CAPS capsule Take 50,000 Units by mouth once a week   Yes Historical Provider, MD   aspirin 81 MG EC tablet Take 81 mg by mouth daily   Yes Historical Provider, MD   acetaminophen (TYLENOL) 325 MG tablet Take by mouth 8/13/19  Yes Historical Provider, MD   levothyroxine (SYNTHROID) 125 MCG tablet Take 125 mcg by mouth 8/16/19  Yes Historical Provider, MD   temazepam (RESTORIL) 30 MG capsule TAKE 1 CAPSULE BY MOUTH ONCE DAILY AT BEDTIME AS NEEDED FOR SLEEP 8/20/19  Yes Historical Provider, MD   calcitRIOL (ROCALTROL) 0.5 MCG capsule Take 1 mcg by mouth daily    Historical Provider, MD   traMADol (ULTRAM) 50 MG tablet Take 50 mg by mouth every 6 hours as needed for Pain.     Historical Provider, MD   docusate sodium (COLACE) 100 MG capsule Take 100 mg by mouth 8/23/19   Historical Provider, MD   NIFEdipine (ADALAT CC) 30 MG extended release tablet TAKE 1 TABLET BY MOUTH ONCE DAILY FOR 2 DAYS ON AN EMPTY STOMACH THEN INCREASE TO TWICE DAILY 9/3/19   Historical Provider, MD       Allergies:  Dye [iodides]    Social History:      The patient currently lives at home with . TOBACCO:   reports that she has never smoked. She has never used smokeless tobacco.  ETOH:   reports previous alcohol use. Family History:      Reviewed in detail and negative for DM, CAD, Cancer, CVA. Positive as follows:        Problem Relation Age of Onset    Cancer Father     No Known Problems Sister     No Known Problems Brother        Diet:  ADULT DIET; Regular; Low Sodium (2 gm)    REVIEW OF SYSTEMS:   Pertinent positives as noted in the HPI. All other systems reviewed and negative. PHYSICAL EXAM:    BP (!) 180/81   Pulse 70   Temp 98 °F (36.7 °C) (Oral)   Resp 16   Ht 5' 6\" (1.676 m)   Wt 133 lb (60.3 kg)   SpO2 95%   BMI 21.47 kg/m²     General appearance: Appears weak and fatigued, appears stated age and cooperative. HEENT:  Normal cephalic, atraumatic without obvious deformity. Pupils equal, round, and reactive to light. Extra ocular muscles intact. Conjunctivae/corneas clear. Pale conjunctive a and mucosa. Neck: Supple, with full range of motion. No jugular venous distention. Trachea midline. Respiratory:  Normal respiratory effort. Clear to auscultation, bilaterally without Rales/Wheezes/Rhonchi. Cardiovascular:  Regular rate and rhythm with normal S1/S2 with grade 3/6 systolic murmur, rubs or gallops. Abdomen: Soft, non-tender, non-distended with normal bowel sounds. Musculoskeletal:  No clubbing or cyanosis, trace edema bilaterally in lower limbs. .  Full range of motion without deformity. Patient has 4/5 strength night in limbs bilaterally  Skin: Skin color, texture, turgor normal.  No rashes or lesions. Neurologic:  Neurovascularly intact without any focal sensory/motor deficits.  Cranial nerves: II-XII intact, grossly non-focal.  Psychiatric:  Alert and oriented, thought content appropriate, normal insight  Capillary Refill: Brisk,< 3 seconds   Peripheral Pulses: +2 palpable, equal bilaterally       Labs:     Recent Labs

## 2021-07-17 NOTE — DISCHARGE SUMMARY
Discharge Summary    Name:  Kristen Means /Age/Sex:   (76 y.o. female)   MRN & CSN:  [id number] & [id number] Admission Date/Time: 2021  5:32 PM   Attending:  Melia Caruso MD Discharging Physician: Melia Caruso MD     HPI:     Please, see admission HPI in [address] and patient's hospital course below    Hospital Course:   Kristen Means is a 76 y.o.  female  who presents with Hypercalcemia  and the following assessments below, reflect the patient's hospital course     Possible hypercalcemia of malignancy    Patient also has elevated PTH related peptide  Calcium level has dropped from 12.8 to 11.9 >11.1  Per nephrology receiving Sensipar in the K bath during dialysis  Had a follow-up appointment in 3 days with oncology    ESRD on HD -HD on MWF -evaluated by nephrology - follow-up as an outpatient    Hypokalemia -replacement per nephrology    Squamous cell CA of the cervix-stage IIb -follow-up with oncology as an outpatient, pain control    Hypertension -resume home medications when discharge    Chronic medical conditions -resume home medications unless contraindicated    Hypothyroidism  Anxiety  Anemia of chronic disease    Discussed plan of care with nephrology and her bedside nurse    Patient is hemodynamically stable for DC to home    The patient expressed appropriate understanding of and agreement with the discharge recommendations, medications, and plan.      Consults this admission:  [address]    Discharge Instruction:   Follow up appointments: Nephrology, oncology  Primary care physician:  within 1 to 2 weeks    Diet:  renal diet   Activity: activity as tolerated  Disposition: Discharged to:   [x]Home, []HHC, []SNF, []Acute Rehab, []Hospice   Condition on discharge: Stable    Discharge Medications:      Veldon Feathers   Home Medication Instructions [id number]    Printed on:21 1526   Medication Information                      acetaminophen (TYLENOL) 325 MG tablet  Take by mouth             ALPRAZolam (XANAX) 0.5 MG tablet  Take 0.5 mg by mouth three times a week. Before dialysis             aspirin 81 MG EC tablet  Take 81 mg by mouth daily             b complex vitamins capsule  Take 1 capsule by mouth daily             bisacodyl (DULCOLAX) 5 MG EC tablet  Take 5 mg by mouth daily as needed for Constipation             calcitRIOL (ROCALTROL) 0.5 MCG capsule  Take 1 mcg by mouth daily             cinacalcet (SENSIPAR) 30 MG tablet  Take 30 mg by mouth daily             docusate sodium (COLACE) 100 MG capsule  Take 100 mg by mouth             hydrALAZINE (APRESOLINE) 100 MG tablet  Take 50 mg by mouth 3 times daily              hydrOXYzine (ATARAX) 25 MG tablet  Take 50 mg by mouth 3 times daily as needed for Itching              levothyroxine (SYNTHROID) 125 MCG tablet  Take 125 mcg by mouth             minoxidil (LONITEN) 2.5 MG tablet  Take 2.5 mg by mouth 2 times daily             NIFEdipine (ADALAT CC) 30 MG extended release tablet  TAKE 1 TABLET BY MOUTH ONCE DAILY FOR 2 DAYS ON AN EMPTY STOMACH THEN INCREASE TO TWICE DAILY             temazepam (RESTORIL) 30 MG capsule  TAKE 1 CAPSULE BY MOUTH ONCE DAILY AT BEDTIME AS NEEDED FOR SLEEP             traMADol (ULTRAM) 50 MG tablet  Take 50 mg by mouth every 6 hours as needed for Pain.             vitamin D (ERGOCALCIFEROL) 1.25 MG (90658 UT) CAPS capsule  Take 50,000 Units by mouth once a week                 Subjective _patient is resting in bed with no distress while on room air -complains of weakness, fatigue, aching everywhere    Objective Findings at Discharge:   BP (!) 172/71   Pulse 64   Temp 97.8 °F (36.6 °C)   Resp 20   Ht 5' 6\" (1.676 m)   Wt 127 lb 10.3 oz (57.9 kg)   SpO2 97%   BMI 20.60 kg/m²            PHYSICAL EXAM   GEN Awake female, resting in bed in no apparent distress. Appears given age. RESP Clear to auscultation, no wheezes, rales or rhonchi. CARDIO/VAS - S1/S2 auscultated.  Regular rate without appreciable murmurs, rubs, or gallops. Peripheral pulses equal bilaterally and palpable. No peripheral edema. GI Abdomen is soft without significant tenderness, masses, or guarding. Bowel sounds are normoactive  MSK No gross joint deformities. Spontaneous movement of all extremities  SKIN Normal coloration, warm, dry. NEURO Cranial nerves appear grossly intact, normal speech, no lateralizing weakness.     BMP/CBC  Recent Labs     07/16/21  1859 07/17/21  0350    135   K 3.7 3.4*   CL 96* 98   CO2 29 26   BUN 7 9   CREATININE 3.0* 3.5*   WBC 5.3 4.3*   HCT 31.7* 28.6*    222         Discharge Time of 31 minutes    Electronically signed by Nacho Peterson MD on 7/17/2021 at 3:26 PM

## 2021-07-17 NOTE — CONSULTS
[facility]          [facility]        [address]        [place] [facility]        [phone]           Inpatient Initial consult note         7/17/2021 10:37 AM    Patient Name:   Tim Haro  Birthdate:    [birth year]  Primary Care Physician:  Crow Dao     History Obtained From:  patient     Consultation requested by : Neftali Hays MD    requested for  : Evaluation of  ESRD and hypercalcemia     History of presentingillness   Tim Haro is a 76 y.o.   female with Past Medical History as stated below presented with a chief complaint of Generalized Body Aches, Fatigue, and Abnormal Test Results (high calcium)   on 7/16/2021 . Patient is an ESRD patient on hemodialysis Monday Wednesday Friday. She has been running high calcium. Her outpatient calcium from the dialysis unit is 13.6 , and she was feeling very weak and tired and ill looking yesterday so she was sent to ER for further evaluation and management. She was dialyzed on a low calcium bath yesterday and her PTH also was slightly elevated compared to the degree of hypercalcemia so the patient has also received some Sensipar yesterday. She did have a history of cervical cancer in the past and also some hyperkalemia for which she has received Xgeva by Dr. Valdemar Sims sometime in January. She is supposed to see Dr. Valdemar Sims next week and was also scheduled to receive Xgeva on Tuesday. The patient's calcium upon arrival was 11.8.   The patient is also very eager to go home     Past History      Past Medical History:   Diagnosis Date    Anxiety     Diarrhea     Dryness of periwound skin     Gout, joint     Hypertension     Kidney disease     Mouth dryness      Past Surgical History:   Procedure Laterality Date    AV FISTULA CREATION Left     URETER STENT PLACEMENT  11/2019     Social History     Socioeconomic History    Marital status:      Spouse name: Not on file    Number of children: Not on file    Years of education: Not on file    Highest education level: Not on file   Occupational History    Not on file   Tobacco Use    Smoking status: Never Smoker    Smokeless tobacco: Never Used   Substance and Sexual Activity    Alcohol use: Not Currently    Drug use: Never    Sexual activity: Not Currently   Other Topics Concern    Not on file   Social History Narrative    Not on file     Social Determinants of Health     Financial Resource Strain:     Difficulty of Paying Living Expenses:    Food Insecurity:     Worried About Running Out of Food in the Last Year:     [address] in the Last Year:    Transportation Needs:     Lack of Transportation (Medical):  Lack of Transportation (Non-Medical):    Physical Activity:     Days of Exercise per Week:     Minutes of Exercise per Session:    Stress:     Feeling of Stress :    Social Connections:     Frequency of Communication with Friends and Family:     Frequency of Social Gatherings with Friends and Family:     Attends Christian Services:     Active Member of Clubs or Organizations:     Attends Club or Organization Meetings:     Marital Status:    Intimate Partner Violence:     Fear of Current or Ex-Partner:     Emotionally Abused:     Physically Abused:     Sexually Abused:      Family History   Problem Relation Age of Onset    Cancer Father     No Known Problems Sister     No Known Problems Brother      Medications & Allergies      Prior to Admission medications    Medication Sig Start Date End Date Taking? Authorizing Provider   cinacalcet (SENSIPAR) 30 MG tablet Take 30 mg by mouth daily   Yes Historical Provider, MD   minoxidil (LONITEN) 2.5 MG tablet Take 2.5 mg by mouth 2 times daily   Yes Historical Provider, MD   ALPRAZolam (XANAX) 0.5 MG tablet Take 0.5 mg by mouth three times a week.  Before dialysis   Yes Historical Provider, MD   bisacodyl (DULCOLAX) 5 MG EC tablet Take 5 mg by mouth daily as needed for Constipation   Yes Historical Provider, MD   b complex vitamins capsule Take 1 capsule by mouth daily   Yes Historical Provider, MD   hydrOXYzine (ATARAX) 25 MG tablet Take 50 mg by mouth 3 times daily as needed for Itching    Yes Historical Provider, MD   hydrALAZINE (APRESOLINE) 100 MG tablet Take 50 mg by mouth 3 times daily    Yes Historical Provider, MD   vitamin D (ERGOCALCIFEROL) 1.25 MG (36853 UT) CAPS capsule Take 50,000 Units by mouth once a week   Yes Historical Provider, MD   aspirin 81 MG EC tablet Take 81 mg by mouth daily   Yes Historical Provider, MD   acetaminophen (TYLENOL) 325 MG tablet Take by mouth 8/13/19  Yes Historical Provider, MD   levothyroxine (SYNTHROID) 125 MCG tablet Take 125 mcg by mouth 8/16/19  Yes Historical Provider, MD   temazepam (RESTORIL) 30 MG capsule TAKE 1 CAPSULE BY MOUTH ONCE DAILY AT BEDTIME AS NEEDED FOR SLEEP 8/20/19  Yes Historical Provider, MD   calcitRIOL (ROCALTROL) 0.5 MCG capsule Take 1 mcg by mouth daily    Historical Provider, MD   traMADol (ULTRAM) 50 MG tablet Take 50 mg by mouth every 6 hours as needed for Pain.     Historical Provider, MD   docusate sodium (COLACE) 100 MG capsule Take 100 mg by mouth 8/23/19   Historical Provider, MD   NIFEdipine (ADALAT CC) 30 MG extended release tablet TAKE 1 TABLET BY MOUTH ONCE DAILY FOR 2 DAYS ON AN EMPTY STOMACH THEN INCREASE TO TWICE DAILY 9/3/19   Historical Provider, MD     Allergies: Dye [iodides]  IP meds : Scheduled Meds:   cinacalcet  30 mg Oral Daily    minoxidil  2.5 mg Oral BID    aspirin  81 mg Oral Daily    therapeutic multivitamin-minerals  1 tablet Oral Daily    hydrALAZINE  50 mg Oral TID    levothyroxine  125 mcg Oral Daily    NIFEdipine  30 mg Oral Daily    sodium chloride flush  5-40 mL Intravenous 2 times per day    heparin (porcine)  5,000 Units Subcutaneous Q8H    denosumab  120 mg Subcutaneous Once     Continuous Infusions:   sodium chloride      sodium chloride       Review of Systems Physical Exam   Review of Systems   Constitutional: Positive for activity change and appetite change. Negative for fever. HENT: Negative. Respiratory: Negative for cough and shortness of breath. Cardiovascular: Negative for chest pain and leg swelling. Gastrointestinal: Negative for abdominal pain, nausea and vomiting. Genitourinary: Negative for dysuria, hematuria and urgency. Musculoskeletal: Negative for back pain and neck pain. Skin: Negative for rash and wound. Neurological: Negative for dizziness, light-headedness, numbness and headaches. Psychiatric/Behavioral: Negative for agitation and confusion. Physical Exam  Vitals reviewed. Constitutional:       General: She is not in acute distress. Appearance: She is ill-appearing. HENT:      Head: Normocephalic and atraumatic. Right Ear: External ear normal.      Left Ear: External ear normal.      Nose: Nose normal.      Mouth/Throat:      Mouth: Mucous membranes are dry. Eyes:      Comments: Lids normal appearence   Neck:      Thyroid: No thyromegaly. Vascular: No JVD. Cardiovascular:      Rate and Rhythm: Normal rate and regular rhythm. Heart sounds: Normal heart sounds. No murmur heard. No friction rub. No gallop. Pulmonary:      Effort: Pulmonary effort is normal.      Breath sounds: Normal breath sounds. Chest:      Chest wall: No tenderness. Abdominal:      General: Bowel sounds are normal. There is no distension. Palpations: Abdomen is soft. Tenderness: There is no abdominal tenderness. Musculoskeletal:      Cervical back: Normal range of motion and neck supple. Skin:     General: Skin is warm and dry. Findings: No erythema or rash. Neurological:      General: No focal deficit present. Mental Status: She is alert and oriented to person, place, and time.    Psychiatric:         Mood and Affect: Mood normal.         Behavior: Behavior normal.           Vitals: 07/17/21 0900   BP: (!) 199/84   Pulse: 61   Resp: 16   Temp: 97.8 °F (36.6 °C)   SpO2: 96%     Labs, Radiology and Tests       Recent Labs     07/16/21 1859 07/17/21  0350   WBC 5.3 4.3*   RBC 3.38* 3.00*   HGB 9.9* 8.9*   HCT 31.7* 28.6*   MCV 93.8 95.3   MCH 29.3 29.7   MCHC 31.2* 31.1*    222     Recent Labs     07/16/21 1859 07/17/21  0350    135   K 3.7 3.4*   CL 96* 98   CO2 29 26   BUN 7 9   CREATININE 3.0* 3.5*   CALCIUM 11.8* 11.9*   PROT 6.4  --    LABALBU 4.1  --    BILITOT 0.5  --    ALKPHOS 79  --    AST 19  --    ALT 12  --      Assessment    1 Renal -ESRD on hemodialysis Monday Wednesday Friday  ? Had HD yesterday  ? However we will get the patient dialyzed today due to hypercalcemia  ? Patient agreed to have a short treatment of 2-1/2 hours    2 Electrolytes -mild hypokalemia dialyze on a 4K bath  3 Hypercalcemia-not exactly clear appears to be having slightly higher PTH and apparently the PTH RP was also slightly elevated as per the record. Seeing oncology on Tuesday and was scheduled to receive Xgeva as an outpatient. For now on dialyzing the patient on 3K bath today and I will also give another 180 of Sensipar today. 4 Essential hypertension running high give a.m. medications and monitor  5 Meds reviewed and discussed with patient at this time for nursing staff      **This report has been created using voice recognition software. It maycontain minor  errors which are inherent in voice recognition technology. **    Polo Reynaga MD,M.D  [facility].

## 2021-07-17 NOTE — FLOWSHEET NOTE
07/17/21 1055 07/17/21 1222   Vital Signs   BP (!) 190/76 (!) 172/71   Temp 97.8 °F (36.6 °C) 97.8 °F (36.6 °C)   Pulse 63 64   Resp 20 20   SpO2 97 %  --    Weight 127 lb 10.3 oz (57.9 kg) 127 lb 10.3 oz (57.9 kg)   Post-Hemodialysis Assessment   Post-Treatment Procedures  --  Blood returned; Access bleeding time < 10 minutes   Machine Disinfection Process  --  Acid/Vinegar Clean;Heat Disinfect; Exterior Machine Disinfection   Total Liters Processed (l/min)  --  29.4 l/min   Dialyzer Clearance  --  Clotted   Duration of Treatment (minutes)  --  45 minutes   Heparin amount administered during treatment (units)  --  0 units   NET Removed (ml)  --  0 ml   Tolerated Treatment  --  Poor   Machine alarming venous pressure elevated arm on pillow, pt yelling out. BFR stopped and attempted to confirm needle placement. Venous needle not in place and site infiltrated. Lines placed in recirc, flushed arterial needle without issues. Venous needle pulled, and attempted to restick venous needle x3 with no success. Attempted to return blood but unable to secondary to clotted dialysis lines. Dr. Kvng Fine notified Pressure held to access.  Patient transported back to room with stable VS.

## 2021-07-17 NOTE — ED NOTES
ED to inpatient nurses report    Chief Complaint   Patient presents with    Generalized Body Aches    Fatigue    Abnormal Test Results     high calcium      Present to ED from home  LOC: alert and orientated to name, place, date  Vital signs   Vitals:    07/16/21 1730 07/16/21 1853 07/16/21 1939 07/16/21 2120   BP: (!) 178/72 (!) 188/72 (!) 174/67 (!) 166/44   Pulse:   66 67   Resp:   16 16   SpO2:  98% 94% 94%   Weight:       Height:          Oxygen Baseline room air     Current needs required none Bipap/Cpap No  LDAs:   Peripheral IV 07/16/21 Right Antecubital (Active)     Mobility: Requires assistance * 2  Pending ED orders: None  Present condition: Stable     Electronically signed by YARA Mason on 7/16/2021 at 9:20 PM       N[facility] MasonRehabilitation [facility] of [facility]  07/16/21 2122

## 2021-07-19 NOTE — PROGRESS NOTES
[facility] CANCER CENTER  C[facility]ER NETWORK OF [facility]  [facility] [address] [facility]  [address] [facility] [place] [facility] [address]  Dept: [phone]  Dept Fax: [phone]  Loc: [phone]    Subjective:      Chief Complaint:  Felipa Banks is a 76 y.o. female with cervical cancer. In June 2019, the patient began to develop a vague lower abdominal pain. This is associated sign and symptom progressively became worse to the point that it was severe. In July 2019 the patient developed vaginal bleeding which she describes also severe. This prompted her to present to the emergency department at [facility] in [place]. A pelvic ultrasound was completed which found fullness of the lower uterine segment. A follow-up CT scan of the abdomen pelvis did not show any area of lymphadenopathy or other intra-abdominal or pelvic findings. Eventually an MRI scan of the pelvis was completed which displayed a 5 cm cervical neoplasm with invasion of the parametrial and upper vagina region. There was noted obstruction of the endocervical canal with dilatation of the endometrial cavity. In context to this condition, the patient was then referred to [facility] gynecological oncology for further evaluation. Modifying factor affecting this condition was that on October 26, 2019 the patient underwent biopsy of the exocervix, endocervix, and endometrium. Surgical pathology confirmed squamous cell carcinoma. A follow-up PET scan then was completed which found intense abnormal metabolic activity associated with the primary malignancy but no evidence of metastatic spread of malignancy. 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 HPI:   Denver Putnam is here today for follow-up regarding history of hypercalcemia. She also has history of cervical cancer, chronic renal failure and chronic anemia. content normal.  Skin: Skin is warm and dry. No rash. Psychiatric: Mood and affect appropriate for the clinical situation. Data Analysis: The following laboratory studies were reviewed with the patient today:    Hematology 7/20/2021 7/17/2021 7/16/2021   WBC 5.2 4.3 (L) 5.3   RBC 3.48 (L) 3.00 (L) 3.38 (L)   HGB 10.3 (L) 8.9 (L) 9.9 (L)   HCT 32.1 (L) 28.6 (L) 31.7 (L)   MCV 92 95.3 93.8   RDW 15.2 (H)      222 267        Ref. Range 2/2/2021 13:00 7/16/2021 18:59 7/17/2021 03:50 7/17/2021 14:42 7/20/2021 09:41   Calcium Latest Ref Range: 8.5 - 10.5 mg/dL 8.0 (L) 11.8 (H) 11.9 (H) 11.1 (H) 12.9 (H)     Assessment:   1. 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 Hypercalcemia. 3.  Renal failure. 4.  Chronic anemia. Plan:   1. Monitor for recurrence of malignancy. 2.  Monitor hemoglobin/hematocrit and for any signs of blood loss. 3.  Proceed with therapy with Rosezena Din today for treatment of hypercalcemia. 4.  Continue erythropoietin therapy as outlined by her nephrologist with dialysis. 5.  Follow-up with nephrology for continued management. 6.  Monitor calcium level and response to therapy. 7.  Monitor for side effects or toxicity related to Rosezena Din therapy. Multiple questions were answered throughout the course the consultation. By the end of the consultation, all the patient's questions had been answered. The patient was asked to call if there were any questions or concerns prior to the next scheduled clinic visit. Michaela Norris M.D.                                                                          Medical Director: [facility]  Cancer Network [place] [address] [address] [facility] [facility], [address] [place] [address], [address] [place] [address], [address] [place] [address], [address] [place] [address] of [facility]

## 2021-07-20 ENCOUNTER — OFFICE VISIT (OUTPATIENT)
Dept: ONCOLOGY | Age: 66
End: 2021-07-20
Payer: MEDICARE

## 2021-07-20 ENCOUNTER — HOSPITAL ENCOUNTER (OUTPATIENT)
Dept: INFUSION THERAPY | Age: 66
Discharge: HOME | End: 2021-07-20
Payer: MEDICARE

## 2021-07-20 VITALS
DIASTOLIC BLOOD PRESSURE: 68 MMHG | HEART RATE: 69 BPM | SYSTOLIC BLOOD PRESSURE: 178 MMHG | RESPIRATION RATE: 19 BRPM | BODY MASS INDEX: 21.02 KG/M2 | HEIGHT: 66 IN | WEIGHT: 130.8 LBS | OXYGEN SATURATION: 98 %

## 2021-07-20 DIAGNOSIS — D64.9 CHRONIC ANEMIA: ICD-10-CM

## 2021-07-20 DIAGNOSIS — E83.52 HYPERCALCEMIA: Primary | ICD-10-CM

## 2021-07-20 DIAGNOSIS — Z51.11 ENCOUNTER FOR CHEMOTHERAPY MANAGEMENT: ICD-10-CM

## 2021-07-20 DIAGNOSIS — N18.4 CHRONIC RENAL INSUFFICIENCY, STAGE 4 (SEVERE) (HCC): ICD-10-CM

## 2021-07-20 DIAGNOSIS — C53.9 MALIGNANT NEOPLASM OF CERVIX, UNSPECIFIED SITE (HCC): ICD-10-CM

## 2021-07-20 LAB
ABSOLUTE IMMATURE GRANULOCYTE: 0.02 THOU/MM3 (ref 0–0.07)
BASINOPHIL, AUTOMATED: 1 % (ref 0–3)
BASOPHILS ABSOLUTE: 0 THOU/MM3 (ref 0–0.1)
BUN, WHOLE BLOOD: 10 MG/DL (ref 8–26)
CALCIUM SERPL-MCNC: 12.9 MG/DL (ref 8.5–10.5)
CHLORIDE, WHOLE BLOOD: 99 MEQ/L (ref 98–109)
CREATININE, WHOLE BLOOD: 4.1 MG/DL (ref 0.5–1.2)
EOSINOPHILS ABSOLUTE: 0.1 THOU/MM3 (ref 0–0.4)
EOSINOPHILS RELATIVE PERCENT: 2 % (ref 0–4)
GFR, ESTIMATED ,CON: 12 ML/MIN/1.73M2
GLUCOSE, WHOLE BLOOD: 99 MG/DL (ref 70–108)
HCT VFR BLD CALC: 32.1 % (ref 37–47)
HEMOGLOBIN: 10.3 GM/DL (ref 12–16)
IMMATURE GRANULOCYTES: 0 %
IONIZED CALCIUM, WHOLE BLOOD: 1.21 MMOL/L (ref 1.12–1.32)
LYMPHOCYTES # BLD: 13 % (ref 15–47)
LYMPHOCYTES ABSOLUTE: 0.7 THOU/MM3 (ref 1–4.8)
MCH RBC QN AUTO: 29.6 PG (ref 26–33)
MCHC RBC AUTO-ENTMCNC: 32.1 GM/DL (ref 32.2–35.5)
MCV RBC AUTO: 92 FL (ref 81–99)
MONOCYTES ABSOLUTE: 0.7 THOU/MM3 (ref 0.4–1.3)
MONOCYTES: 13 % (ref 0–12)
PDW BLD-RTO: 15.2 % (ref 11.5–14.5)
PLATELET # BLD: 228 THOU/MM3 (ref 130–400)
PMV BLD AUTO: 9.6 FL (ref 9.4–12.4)
POTASSIUM, WHOLE BLOOD: 3.5 MEQ/L (ref 3.5–4.9)
RBC # BLD: 3.48 MILL/MM3 (ref 4.2–5.4)
SEG NEUTROPHILS: 71 % (ref 43–75)
SEGMENTED NEUTROPHILS ABSOLUTE COUNT: 3.7 THOU/MM3 (ref 1.8–7.7)
SODIUM, WHOLE BLOOD: 136 MEQ/L (ref 138–146)
TOTAL CO2, WHOLE BLOOD: 27 MEQ/L (ref 23–33)
WBC # BLD: 5.2 THOU/MM3 (ref 4.8–10.8)

## 2021-07-20 PROCEDURE — 96372 THER/PROPH/DIAG INJ SC/IM: CPT

## 2021-07-20 PROCEDURE — 1111F DSCHRG MED/CURRENT MED MERGE: CPT | Performed by: INTERNAL MEDICINE

## 2021-07-20 PROCEDURE — 80047 BASIC METABLC PNL IONIZED CA: CPT

## 2021-07-20 PROCEDURE — G8420 CALC BMI NORM PARAMETERS: HCPCS | Performed by: INTERNAL MEDICINE

## 2021-07-20 PROCEDURE — 85025 COMPLETE CBC W/AUTO DIFF WBC: CPT

## 2021-07-20 PROCEDURE — 99215 OFFICE O/P EST HI 40 MIN: CPT | Performed by: INTERNAL MEDICINE

## 2021-07-20 PROCEDURE — G8400 PT W/DXA NO RESULTS DOC: HCPCS | Performed by: INTERNAL MEDICINE

## 2021-07-20 PROCEDURE — 4040F PNEUMOC VAC/ADMIN/RCVD: CPT | Performed by: INTERNAL MEDICINE

## 2021-07-20 PROCEDURE — 1123F ACP DISCUSS/DSCN MKR DOCD: CPT | Performed by: INTERNAL MEDICINE

## 2021-07-20 PROCEDURE — 36415 COLL VENOUS BLD VENIPUNCTURE: CPT

## 2021-07-20 PROCEDURE — 1090F PRES/ABSN URINE INCON ASSESS: CPT | Performed by: INTERNAL MEDICINE

## 2021-07-20 PROCEDURE — 3017F COLORECTAL CA SCREEN DOC REV: CPT | Performed by: INTERNAL MEDICINE

## 2021-07-20 PROCEDURE — G8427 DOCREV CUR MEDS BY ELIG CLIN: HCPCS | Performed by: INTERNAL MEDICINE

## 2021-07-20 PROCEDURE — 1036F TOBACCO NON-USER: CPT | Performed by: INTERNAL MEDICINE

## 2021-07-20 PROCEDURE — 6360000002 HC RX W HCPCS: Performed by: INTERNAL MEDICINE

## 2021-07-20 PROCEDURE — 99211 OFF/OP EST MAY X REQ PHY/QHP: CPT

## 2021-07-20 PROCEDURE — 82310 ASSAY OF CALCIUM: CPT

## 2021-07-20 RX ORDER — B,C/FOLIC/ZINC/SELENOMETH/D3/E 0.8-12.5MG
TABLET ORAL
COMMUNITY
Start: 2021-06-30 | End: 2021-09-14 | Stop reason: ALTCHOICE

## 2021-07-20 RX ORDER — LIDOCAINE AND PRILOCAINE 25; 25 MG/G; MG/G
CREAM TOPICAL
COMMUNITY
Start: 2021-07-01 | End: 2021-09-14

## 2021-07-20 RX ADMIN — DENOSUMAB 120 MG: 120 INJECTION SUBCUTANEOUS at 11:18

## 2021-07-20 NOTE — PATIENT INSTRUCTIONS
1.  Therapy with Xgeva x1 today. 2.  Patient is going to call on Friday to discuss symptoms and further follow-up arrangements will be made at that time.

## 2021-07-20 NOTE — PROGRESS NOTES
Patient tolerated xgeva without any complications. Patient verbalizes understanding of discharge instructions, off unit in wheelchair with family and belongings.

## 2021-07-20 NOTE — PLAN OF CARE
Problem: Musculor/Skeletal Functional Status  Goal: Absence of falls  Outcome: Met This Shift  Note: Patient verbalizes understanding of fall precautions. Patient free from falls this visit. Intervention: Fall precautions  Note: Discussed fall precautions, call light within reach. Problem: Intellectual/Education/Knowledge Deficit  Goal: Teaching initiated upon admission  Outcome: Met This Shift  Note: Patient verbalizes understanding to verbal information given on xgeva,action and possible side effects. Aware to call MD if develop complications. Intervention: Verbal/written education provided  Note: Discussed xgeva action, possible side effects and when to call the doctor. Problem: Discharge Planning  Goal: Knowledge of discharge instructions  Description: Knowledge of discharge instructions  Outcome: Met This Shift  Note: Verbalized understanding of discharge instructions, follow-up appointments, and when to call the physician. Intervention: Discharge to appropriate level of care  Note: Discuss discharge instructions, follow ups, and when to call the doctor. Care plan reviewed with patient and family. Patient and family verbalize understanding of the plan of care and contribute to goal setting.

## 2021-07-23 ENCOUNTER — TELEPHONE (OUTPATIENT)
Dept: ONCOLOGY | Age: 66
End: 2021-07-23

## 2021-09-14 ENCOUNTER — HOSPITAL ENCOUNTER (OUTPATIENT)
Dept: INFUSION THERAPY | Age: 66
Discharge: HOME | End: 2021-09-14
Payer: MEDICARE

## 2021-09-14 ENCOUNTER — OFFICE VISIT (OUTPATIENT)
Dept: ONCOLOGY | Age: 66
End: 2021-09-14
Payer: MEDICARE

## 2021-09-14 VITALS
WEIGHT: 127.4 LBS | HEIGHT: 66 IN | RESPIRATION RATE: 16 BRPM | SYSTOLIC BLOOD PRESSURE: 172 MMHG | TEMPERATURE: 98.8 F | DIASTOLIC BLOOD PRESSURE: 72 MMHG | OXYGEN SATURATION: 97 % | BODY MASS INDEX: 20.48 KG/M2 | HEART RATE: 79 BPM

## 2021-09-14 DIAGNOSIS — D64.9 CHRONIC ANEMIA: ICD-10-CM

## 2021-09-14 DIAGNOSIS — E83.52 HYPERCALCEMIA: Primary | ICD-10-CM

## 2021-09-14 DIAGNOSIS — E83.52 HYPERCALCEMIA: ICD-10-CM

## 2021-09-14 DIAGNOSIS — C53.9 MALIGNANT NEOPLASM OF CERVIX, UNSPECIFIED SITE (HCC): ICD-10-CM

## 2021-09-14 DIAGNOSIS — N18.4 CHRONIC RENAL INSUFFICIENCY, STAGE 4 (SEVERE) (HCC): ICD-10-CM

## 2021-09-14 LAB
ABSOLUTE IMMATURE GRANULOCYTE: 0.02 THOU/MM3 (ref 0–0.07)
ALBUMIN SERPL-MCNC: 4.5 G/DL (ref 3.5–5.1)
ALP BLD-CCNC: 88 U/L (ref 38–126)
ALT SERPL-CCNC: 12 U/L (ref 11–66)
AST SERPL-CCNC: 18 U/L (ref 5–40)
BASINOPHIL, AUTOMATED: 0 % (ref 0–3)
BASOPHILS ABSOLUTE: 0 THOU/MM3 (ref 0–0.1)
BILIRUB SERPL-MCNC: 0.6 MG/DL (ref 0.3–1.2)
BILIRUBIN DIRECT: < 0.2 MG/DL (ref 0–0.3)
BUN, WHOLE BLOOD: 22 MG/DL (ref 8–26)
CALCIUM SERPL-MCNC: 10.5 MG/DL (ref 8.5–10.5)
CHLORIDE, WHOLE BLOOD: 101 MEQ/L (ref 98–109)
CREATININE, WHOLE BLOOD: 4.1 MG/DL (ref 0.5–1.2)
EOSINOPHILS ABSOLUTE: 0.1 THOU/MM3 (ref 0–0.4)
EOSINOPHILS RELATIVE PERCENT: 2 % (ref 0–4)
GFR, ESTIMATED ,CON: 12 ML/MIN/1.73M2
GLUCOSE, WHOLE BLOOD: 97 MG/DL (ref 70–108)
HCT VFR BLD CALC: 35.6 % (ref 37–47)
HEMOGLOBIN: 11.6 GM/DL (ref 12–16)
IMMATURE GRANULOCYTES: 0 %
IONIZED CALCIUM, WHOLE BLOOD: 1.22 MMOL/L (ref 1.12–1.32)
LYMPHOCYTES # BLD: 6 % (ref 15–47)
LYMPHOCYTES ABSOLUTE: 0.4 THOU/MM3 (ref 1–4.8)
MCH RBC QN AUTO: 29.3 PG (ref 26–33)
MCHC RBC AUTO-ENTMCNC: 32.6 GM/DL (ref 32.2–35.5)
MCV RBC AUTO: 90 FL (ref 81–99)
MONOCYTES ABSOLUTE: 0.8 THOU/MM3 (ref 0.4–1.3)
MONOCYTES: 13 % (ref 0–12)
PDW BLD-RTO: 17.3 % (ref 11.5–14.5)
PLATELET # BLD: 234 THOU/MM3 (ref 130–400)
PMV BLD AUTO: 9.2 FL (ref 9.4–12.4)
POTASSIUM, WHOLE BLOOD: 4 MEQ/L (ref 3.5–4.9)
RBC # BLD: 3.96 MILL/MM3 (ref 4.2–5.4)
SEG NEUTROPHILS: 79 % (ref 43–75)
SEGMENTED NEUTROPHILS ABSOLUTE COUNT: 5.3 THOU/MM3 (ref 1.8–7.7)
SODIUM, WHOLE BLOOD: 137 MEQ/L (ref 138–146)
TOTAL CO2, WHOLE BLOOD: 28 MEQ/L (ref 23–33)
TOTAL PROTEIN: 6.8 G/DL (ref 6.1–8)
WBC # BLD: 6.7 THOU/MM3 (ref 4.8–10.8)

## 2021-09-14 PROCEDURE — 1090F PRES/ABSN URINE INCON ASSESS: CPT | Performed by: INTERNAL MEDICINE

## 2021-09-14 PROCEDURE — 3017F COLORECTAL CA SCREEN DOC REV: CPT | Performed by: INTERNAL MEDICINE

## 2021-09-14 PROCEDURE — 99215 OFFICE O/P EST HI 40 MIN: CPT | Performed by: INTERNAL MEDICINE

## 2021-09-14 PROCEDURE — G8400 PT W/DXA NO RESULTS DOC: HCPCS | Performed by: INTERNAL MEDICINE

## 2021-09-14 PROCEDURE — 80047 BASIC METABLC PNL IONIZED CA: CPT

## 2021-09-14 PROCEDURE — 1036F TOBACCO NON-USER: CPT | Performed by: INTERNAL MEDICINE

## 2021-09-14 PROCEDURE — 99211 OFF/OP EST MAY X REQ PHY/QHP: CPT

## 2021-09-14 PROCEDURE — 82310 ASSAY OF CALCIUM: CPT

## 2021-09-14 PROCEDURE — G8427 DOCREV CUR MEDS BY ELIG CLIN: HCPCS | Performed by: INTERNAL MEDICINE

## 2021-09-14 PROCEDURE — 1123F ACP DISCUSS/DSCN MKR DOCD: CPT | Performed by: INTERNAL MEDICINE

## 2021-09-14 PROCEDURE — 36415 COLL VENOUS BLD VENIPUNCTURE: CPT

## 2021-09-14 PROCEDURE — 4040F PNEUMOC VAC/ADMIN/RCVD: CPT | Performed by: INTERNAL MEDICINE

## 2021-09-14 PROCEDURE — 85025 COMPLETE CBC W/AUTO DIFF WBC: CPT

## 2021-09-14 PROCEDURE — 80076 HEPATIC FUNCTION PANEL: CPT

## 2021-09-14 PROCEDURE — G8420 CALC BMI NORM PARAMETERS: HCPCS | Performed by: INTERNAL MEDICINE

## 2021-09-22 NOTE — PROGRESS NOTES
[facility] CANCER CENTER  CANCER NETWORK OF [facility]  [facility] [address] [facility] [address]  [address]  [address]  Dept: [phone]  Dept Fax: [phone]  Loc: [phone]    Subjective:      Chief Complaint:  Ruth Jacobson is a 78 y.o. female with cervical cancer. In June 2019, the patient began to develop a vague lower abdominal pain. This is associated sign and symptom progressively became worse to the point that it was severe. In July 2019 the patient developed vaginal bleeding which she describes also severe. This prompted her to present to the emergency department at [facility] in [place]. A pelvic ultrasound was completed which found fullness of the lower uterine segment. A follow-up CT scan of the abdomen pelvis did not show any area of lymphadenopathy or other intra-abdominal or pelvic findings. Eventually an MRI scan of the pelvis was completed which displayed a 5 cm cervical neoplasm with invasion of the parametrial and upper vagina region. There was noted obstruction of the endocervical canal with dilatation of the endometrial cavity. In context to this condition, the patient was then referred to [facility] gynecological oncology for further evaluation. Modifying factor affecting this condition was that on October 26, 2019 the patient underwent biopsy of the exocervix, endocervix, and endometrium. Surgical pathology confirmed squamous cell carcinoma. A follow-up PET scan then was completed which found intense abnormal metabolic activity associated with the primary malignancy but no evidence of metastatic spread of malignancy. 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 HPI:   Pedro Dominguez returns today for follow-up regarding her history of hypercalcemia.   The patient also has a history of cervical cancer, chronic anemia, and chronic renal failure. She is on dialysis for chronic renal failure and is monitored by her nephrologist.  Her chronic anemia is also likely related to her chronic renal failure. In regards to her hypercalcemia the ED otology of this is not entirely clear. I did discuss with the patient again that cervical cancer or malignancy may be involved with the development of hypercalcemia. However, she does not want to meet with a gynecologist or have CT scans to further evaluate the possibility of recurrence of her cervical cancer. The implications of this were again reviewed with the patient today. On today's evaluation she states that she feels relatively well. She does not have any specific complaints. Her calcium level is at the upper limits of normal today. She continues to have chronic anemia but no evidence of blood loss. Jay Bower has not had fever, cough, shortness of breath or other signs of infection. Her bowel habits have been normal.  The patient's ECOG performance status is level 0. PMH, SH, and FH:  I reviewed the patients medication list and allergy list as noted on the electronic medical record. The PMH, SH and FH were also reviewed as noted on the EMR. Review of Systems  Constitutional: Fatigue. HENT: Negative. Eyes: Negative. Respiratory: Negative. Cardiovascular: Negative. Gastrointestinal: Negative. Genitourinary: Negative. Musculoskeletal: Negative. Skin: Negative. Neurological: Negative. Hematological: Negative. Psychiatric/Behavioral: Negative. Objective:   Physical Exam  Vitals:    09/14/21 1018   BP: (!) 172/72   Pulse: 79   Resp: 16   Temp: 98.8 °F (37.1 °C)   SpO2: 97%   Vitals reviewed and are stable. Constitutional: Well-developed. No acute distress. HENT: Normocephalic and atraumatic. Eyes: Pupils appear equal and reactive. Neck: Overall appearance is symmetrical. No identifiable masses. Pulmonary: Effort normal. No respiratory distress. .  Neurological: Alert and oriented to person, place, and time. Judgment and thought content normal.  Skin: Skin is warm and dry. No rash. Psychiatric: Mood and affect appropriate for the clinical situation. Data Analysis: The following laboratory studies were reviewed with the patient today:    Hematology 9/14/2021 7/20/2021 7/17/2021   WBC 6.7 5.2 4.3 (L)   RBC 3.96 (L) 3.48 (L) 3.00 (L)   HGB 11.6 (L) 10.3 (L) 8.9 (L)   HCT 35.6 (L) 32.1 (L) 28.6 (L)   MCV 90 92 95.3   RDW 17.3 (H) 15.2 (H)     228 222      Ref. Range 7/17/2021 03:50 7/17/2021 14:42 7/20/2021 09:41 7/20/2021 10:17 9/14/2021 10:21   Calcium Latest Ref Range: 8.5 - 10.5 mg/dL 11.9 (H) 11.1 (H) 12.9 (H)  10.5     Assessment:   1. 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 Hypercalcemia. 3.  Renal failure. 4.  Chronic anemia. Plan:   1. Monitor for recurrence of malignancy. 2.  Monitor hemoglobin/hematocrit and for any signs of blood loss. .  3.  Continue erythropoietin therapy as outlined by her nephrologist with dialysis. 4.  Follow-up with nephrology for continued management. 5.  Monitor calcium level and response to therapy. Multiple questions were answered throughout the course the consultation. By the end of the consultation, all the patient's questions had been answered. The patient was asked to call if there were any questions or concerns prior to the next scheduled clinic visit. Allie Casanova M.D.                                                                          Medical Director: [facility]  Cancer Network [address] [facility], [address] [place] [address], [address] [place] [address], [address] [place] [address], [address] [place] [address] of [facility] at the [facility]      **This report has been created using voice recognition software. It may contain minor errors which are inherent in voice recognition technology. **

## 2021-12-27 ENCOUNTER — TELEPHONE (OUTPATIENT)
Dept: ONCOLOGY | Age: 66
End: 2021-12-27

## 2021-12-27 NOTE — TELEPHONE ENCOUNTER
We need to schedule her for an Xgeva injection preferably on Tues or Thurs as she has dialysis on MWF.

## 2021-12-27 NOTE — TELEPHONE ENCOUNTER
Patient was told she could be a call in patient because the patient said that her calcium level has went up. She said she needs the injection that she gets for the calcium level being high.     Please advise

## 2021-12-28 ENCOUNTER — HOSPITAL ENCOUNTER (OUTPATIENT)
Dept: INFUSION THERAPY | Age: 66
Discharge: HOME | End: 2021-12-28
Payer: MEDICARE

## 2021-12-28 VITALS
RESPIRATION RATE: 18 BRPM | OXYGEN SATURATION: 96 % | HEART RATE: 64 BPM | BODY MASS INDEX: 20.83 KG/M2 | SYSTOLIC BLOOD PRESSURE: 191 MMHG | TEMPERATURE: 98.9 F | DIASTOLIC BLOOD PRESSURE: 79 MMHG | WEIGHT: 125 LBS | HEIGHT: 65 IN

## 2021-12-28 DIAGNOSIS — C53.9 MALIGNANT NEOPLASM OF CERVIX, UNSPECIFIED SITE (HCC): Primary | ICD-10-CM

## 2021-12-28 DIAGNOSIS — Z51.11 ENCOUNTER FOR CHEMOTHERAPY MANAGEMENT: ICD-10-CM

## 2021-12-28 DIAGNOSIS — E83.52 HYPERCALCEMIA: ICD-10-CM

## 2021-12-28 LAB
BUN, WHOLE BLOOD: 22 MG/DL (ref 8–26)
CALCIUM SERPL-MCNC: 12.4 MG/DL (ref 8.5–10.5)
CHLORIDE, WHOLE BLOOD: 98 MEQ/L (ref 98–109)
CREATININE, WHOLE BLOOD: 4.4 MG/DL (ref 0.5–1.2)
GFR, ESTIMATED ,CON: 11 ML/MIN/1.73M2
GLUCOSE, WHOLE BLOOD: 93 MG/DL (ref 70–108)
IONIZED CALCIUM, WHOLE BLOOD: 1.35 MMOL/L (ref 1.12–1.32)
POTASSIUM, WHOLE BLOOD: 3.6 MEQ/L (ref 3.5–4.9)
SODIUM, WHOLE BLOOD: 136 MEQ/L (ref 138–146)
TOTAL CO2, WHOLE BLOOD: 28 MEQ/L (ref 23–33)

## 2021-12-28 PROCEDURE — 96372 THER/PROPH/DIAG INJ SC/IM: CPT

## 2021-12-28 PROCEDURE — 82310 ASSAY OF CALCIUM: CPT

## 2021-12-28 PROCEDURE — 80047 BASIC METABLC PNL IONIZED CA: CPT

## 2021-12-28 PROCEDURE — 6360000002 HC RX W HCPCS: Performed by: INTERNAL MEDICINE

## 2021-12-28 PROCEDURE — 36415 COLL VENOUS BLD VENIPUNCTURE: CPT

## 2021-12-28 RX ORDER — CLONIDINE HYDROCHLORIDE 0.2 MG/1
0.2 TABLET ORAL 3 TIMES DAILY
COMMUNITY

## 2021-12-28 RX ADMIN — DENOSUMAB 120 MG: 120 INJECTION SUBCUTANEOUS at 14:18

## 2021-12-28 NOTE — PROGRESS NOTES
Xgeva given as charted, tolerated well. Discharged in satisfactory condition. ambulated off unit per self.

## 2022-06-07 ENCOUNTER — TELEPHONE (OUTPATIENT)
Dept: CARDIOLOGY CLINIC | Age: 67
End: 2022-06-07

## 2022-06-07 NOTE — TELEPHONE ENCOUNTER
Patient says she was referred to f/u with Cardiology d/t SOB, increased fatigue and low HR. She said that these symptoms started after some med changes and are wanting her to follow with cardiology. Patient is requesting Dr. Dereje Killian at the [facility] location.   Please advise  [phone]

## 2022-06-14 ENCOUNTER — OFFICE VISIT (OUTPATIENT)
Dept: CARDIOLOGY CLINIC | Age: 67
End: 2022-06-14
Payer: MEDICARE

## 2022-06-14 VITALS
SYSTOLIC BLOOD PRESSURE: 156 MMHG | BODY MASS INDEX: 20.8 KG/M2 | HEIGHT: 65 IN | HEART RATE: 56 BPM | DIASTOLIC BLOOD PRESSURE: 64 MMHG

## 2022-06-14 DIAGNOSIS — Z99.2 STAGE 5 CHRONIC KIDNEY DISEASE ON CHRONIC DIALYSIS (HCC): Primary | ICD-10-CM

## 2022-06-14 DIAGNOSIS — I10 PRIMARY HYPERTENSION: ICD-10-CM

## 2022-06-14 DIAGNOSIS — N18.6 STAGE 5 CHRONIC KIDNEY DISEASE ON CHRONIC DIALYSIS (HCC): Primary | ICD-10-CM

## 2022-06-14 DIAGNOSIS — R06.02 SHORTNESS OF BREATH: ICD-10-CM

## 2022-06-14 PROCEDURE — G8420 CALC BMI NORM PARAMETERS: HCPCS | Performed by: NUCLEAR MEDICINE

## 2022-06-14 PROCEDURE — 99204 OFFICE O/P NEW MOD 45 MIN: CPT | Performed by: NUCLEAR MEDICINE

## 2022-06-14 PROCEDURE — 1036F TOBACCO NON-USER: CPT | Performed by: NUCLEAR MEDICINE

## 2022-06-14 PROCEDURE — G8400 PT W/DXA NO RESULTS DOC: HCPCS | Performed by: NUCLEAR MEDICINE

## 2022-06-14 PROCEDURE — 3017F COLORECTAL CA SCREEN DOC REV: CPT | Performed by: NUCLEAR MEDICINE

## 2022-06-14 PROCEDURE — 1123F ACP DISCUSS/DSCN MKR DOCD: CPT | Performed by: NUCLEAR MEDICINE

## 2022-06-14 PROCEDURE — 1090F PRES/ABSN URINE INCON ASSESS: CPT | Performed by: NUCLEAR MEDICINE

## 2022-06-14 PROCEDURE — G8427 DOCREV CUR MEDS BY ELIG CLIN: HCPCS | Performed by: NUCLEAR MEDICINE

## 2022-06-14 RX ORDER — LANTHANUM CARBONATE 1000 MG/1
1000 TABLET, CHEWABLE ORAL 2 TIMES DAILY WITH MEALS
COMMUNITY

## 2022-06-14 RX ORDER — FOLIC ACID/VIT B COMPLEX AND C 0.8 MG
1 TABLET ORAL DAILY
COMMUNITY

## 2022-06-14 RX ORDER — DIAZEPAM 5 MG/1
5 TABLET ORAL EVERY 6 HOURS PRN
COMMUNITY

## 2022-06-14 ASSESSMENT — ENCOUNTER SYMPTOMS
BACK PAIN: 0
ANAL BLEEDING: 0
DIARRHEA: 0
COLOR CHANGE: 0
SHORTNESS OF BREATH: 1
ABDOMINAL DISTENTION: 0
CONSTIPATION: 0
NAUSEA: 0
BLOOD IN STOOL: 0
RECTAL PAIN: 0
PHOTOPHOBIA: 0
VOMITING: 0
CHEST TIGHTNESS: 0
ABDOMINAL PAIN: 0

## 2022-06-14 NOTE — PROGRESS NOTES
[address] [address] [address] [facility]4Th Floor [address] [facility]  Dept: [phone]  Dept Fax: [phone]  Loc: [phone]    Visit Date: 6/14/2022    Shanda Lopes is a 78 y.o. female who presents todayfor:  Chief Complaint   Patient presents with    New Patient    Hypertension    Shortness of Breath     Here for the first time  Lately had some more dyspnea  More than usual  Exertional   As well uncontrolled HTN   Runs lower HR  No chest pain   Some baseline dyspnea  No known CAD before  She is not active   No smoking   Family history of CAD     HPI:  HPI  Past Medical History:   Diagnosis Date    Anxiety     Diarrhea     Dryness of periwound skin     Gout, joint     Hypertension     Kidney disease     Mouth dryness       Past Surgical History:   Procedure Laterality Date    AV FISTULA CREATION Left     URETERAL STENT PLACEMENT  11/2019     Family History   Problem Relation Age of Onset    Cancer Father     No Known Problems Sister     No Known Problems Brother      Social History     Tobacco Use    Smoking status: Never Smoker    Smokeless tobacco: Never Used   Substance Use Topics    Alcohol use: Not Currently      Current Outpatient Medications   Medication Sig Dispense Refill    diazePAM (VALIUM) 5 MG tablet Take 5 mg by mouth every 6 hours as needed for Anxiety (Takes on dialysis days).       lanthanum (FOSRENOL) 1000 MG chewable tablet Take 1,000 mg by mouth 2 times daily (with meals)      B Complex-C-Folic Acid (RENAL-KIESHA) 0.8 MG TABS Take 1 tablet by mouth daily      cloNIDine (CATAPRES) 0.2 MG tablet Take 0.2 mg by mouth 3 times daily       hydrALAZINE (APRESOLINE) 100 MG tablet Take 100 mg by mouth 3 times daily       aspirin 81 MG EC tablet Take 81 mg by mouth daily      NIFEdipine (ADALAT CC) 60 MG extended release tablet Take 60 mg by mouth daily   3    levothyroxine (SYNTHROID) 125 MCG tablet Take 125 mcg by mouth Daily       temazepam (RESTORIL) 30 MG capsule TAKE 1 CAPSULE BY MOUTH ONCE DAILY AT BEDTIME AS NEEDED FOR SLEEP  2    minoxidil (LONITEN) 2.5 MG tablet Take 2.5 mg by mouth 2 times daily       No current facility-administered medications for this visit. Allergies   Allergen Reactions    Clopidogrel      Other reaction(s): Rash    Dye [Iodides] Swelling and Rash     Health Maintenance   Topic Date Due    Annual Wellness Visit (AWV)  Never done    Depression Screen  Never done    DTaP/Tdap/Td vaccine (1 - Tdap) Never done    Breast cancer screen  Never done    Shingles vaccine (1 of 2) Never done    DEXA (modify frequency per FRAX score)  Never done    Colorectal Cancer Screen  11/27/2021    COVID-19 Vaccine (4 - Booster) 02/12/2022    Lipids  08/05/2025    Flu vaccine  Completed    Pneumococcal 65+ years Vaccine  Completed    Hepatitis C screen  Completed    Hepatitis A vaccine  Aged Out    Hepatitis B vaccine  Aged Out    Hib vaccine  Aged Out    Meningococcal (ACWY) vaccine  Aged Out       Subjective:  Review of Systems   Constitutional: Positive for fatigue. HENT: Negative for ear discharge and mouth sores. Eyes: Negative for photophobia. Respiratory: Positive for shortness of breath. Negative for chest tightness. Cardiovascular: Positive for palpitations. Negative for chest pain. Gastrointestinal: Negative for abdominal distention, abdominal pain, anal bleeding, blood in stool, constipation, diarrhea, nausea, rectal pain and vomiting. Endocrine: Negative for polyphagia. Genitourinary: Negative for dysuria, hematuria and urgency. Musculoskeletal: Negative for arthralgias, back pain, gait problem, joint swelling, myalgias, neck pain and neck stiffness. Skin: Negative for color change, pallor, rash and wound. Allergic/Immunologic: Negative for food allergies. Neurological: Negative for dizziness, syncope and light-headedness.    Psychiatric/Behavioral: Negative for confusion, decreased concentration, dysphoric mood and hallucinations. The patient is not nervous/anxious and is not hyperactive. Objective:  Physical Exam  Constitutional:       Appearance: Normal appearance. HENT:      Head: Normocephalic. Right Ear: Tympanic membrane normal.      Left Ear: Tympanic membrane normal.      Nose: Nose normal.      Mouth/Throat:      Mouth: Mucous membranes are dry. Eyes:      Extraocular Movements: Extraocular movements intact. Pupils: Pupils are equal, round, and reactive to light. Cardiovascular:      Rate and Rhythm: Normal rate and regular rhythm. Heart sounds: Murmur heard. Pulmonary:      Effort: No respiratory distress. Breath sounds: No stridor. No wheezing, rhonchi or rales. Chest:      Chest wall: No tenderness. Abdominal:      General: There is no distension. Palpations: There is no mass. Tenderness: There is no abdominal tenderness. There is no right CVA tenderness, left CVA tenderness, guarding or rebound. Hernia: No hernia is present. Musculoskeletal:         General: No swelling, tenderness, deformity or signs of injury. Right lower leg: No edema. Left lower leg: No edema. Skin:     Coloration: Skin is not jaundiced or pale. Findings: No bruising, erythema or lesion. Neurological:      Mental Status: She is alert and oriented to person, place, and time. Cranial Nerves: No cranial nerve deficit. Sensory: No sensory deficit. Motor: No weakness. Coordination: Coordination normal.      Gait: Gait normal.      Deep Tendon Reflexes: Reflexes normal.   Psychiatric:         Mood and Affect: Mood normal.         Thought Content: Thought content normal.       BP (!) 156/64   Pulse 56   Ht 5' 5\" (1.651 m)   BMI 20.80 kg/m²     Assessment:      Diagnosis Orders   1. Stage 5 chronic kidney disease on chronic dialysis ([address]     2. Primary hypertension     3.  Shortness of breath as above   Uncontrolled HTN  Lower HR   Risk for CAD  Symptoms as above      Plan:  No follow-ups on file. Discussed  Non invasive cardiac testing will be ordered to further evaluate for any ischemic or structural heart disease as a cause of the patient symptoms. We will proceed with a Stress Cardiolite test and echo soon. Increase procardia   Or add ARB  Will discuss with renal   Continue risk factor modification and medical management  Thank you for allowing me to participate in the care of your patient. Please don't hesitate to contact me regarding any further issues related to the patient care    Orders Placed:  No orders of the defined types were placed in this encounter. Medications Prescribed:  No orders of the defined types were placed in this encounter. Discussed use, benefit, and side effects of prescribed medications. All patient questions answered. Pt voicedunderstanding. Instructed to continue current medications, diet and exercise. Continue risk factor modification and medical management. Patient agreed with treatment plan. Follow up as directed.     Electronically signedby Maddie Pike MD on 6/14/2022 at 1:23 PM

## 2022-06-21 DIAGNOSIS — N18.6 STAGE 5 CHRONIC KIDNEY DISEASE ON CHRONIC DIALYSIS (HCC): ICD-10-CM

## 2022-06-21 DIAGNOSIS — I10 PRIMARY HYPERTENSION: ICD-10-CM

## 2022-06-21 DIAGNOSIS — Z99.2 STAGE 5 CHRONIC KIDNEY DISEASE ON CHRONIC DIALYSIS (HCC): ICD-10-CM

## 2022-06-21 DIAGNOSIS — R06.02 SHORTNESS OF BREATH: ICD-10-CM

## 2022-07-18 ENCOUNTER — TELEPHONE (OUTPATIENT)
Dept: CARDIOLOGY CLINIC | Age: 67
End: 2022-07-18

## 2022-07-18 NOTE — TELEPHONE ENCOUNTER
Estefania from [employer] on behalf of Dr Va Diaz. Pt's HR has been in the 45s at dialysis, and Dr Va Diaz wondering what the plan for this patient is. *Of note, stress test was never completed due to pt concerned about dye allergy? No follow-ups on file. Discussed  Non invasive cardiac testing will be ordered to further evaluate for any ischemic or structural heart disease as a cause of the patient symptoms. We will proceed with a Stress Cardiolite test and echo soon.   Increase procardia  Or add ARB  Will discuss with renal     Please call Yaima Graf back at [phone]

## 2022-07-19 NOTE — TELEPHONE ENCOUNTER
Spoke with patient. She states her HR was 50-60's last night. She felt fine. States she had a monitor done at [facility] 6-7 months ago and it was fine. She states she was fine during Dialysis as well. She really didn't want to do the Holter. Called [facility] for results. There was an Event Monitor was 11/17/2020. They will fax. Called [facility] for Holter. They had nothing. [facility] notified. Will await result for Dr Shanti Leon and check if he still wants the Holter. Visit Information Date & Time Provider Department Dept. Phone Encounter #  
 5/16/2017  3:15 PM Rickey Stiles MD [facility] [id number] Follow-up Instructions Return in about 3 months (around 8/16/2017) for labs 1 week before. Upcoming Health Maintenance Date Due Hepatitis C Screening 1965 PAP AKA CERVICAL CYTOLOGY 12/16/2014 BREAST CANCER SCRN MAMMOGRAM 2/27/2016 DTaP/Tdap/Td series (2 - Td) 6/16/2017 INFLUENZA AGE 9 TO ADULT 8/1/2017 COLONOSCOPY 5/13/2020 Allergies as of 5/16/2017  Review Complete On: 5/16/2017 By: Rickey Stiles MD  
  
 Severity Noted Reaction Type Reactions Bactrim [Sulfamethoprim Ds]  12/15/2011    Other (comments) Made mouth feel thick, speech slurred Current Immunizations  Reviewed on 11/9/2015 Name Date Influenza Vaccine 10/15/2015 Influenza Vaccine Nasal 8/1/2012 Influenza Vaccine Whole 8/16/2011 TDAP Vaccine 6/16/2007 Not reviewed this visit You Were Diagnosed With   
  
 Codes Comments Essential hypertension    -  Primary ICD-10-CM: I10 
ICD-9-CM: 401.9 Pure hypercholesterolemia     ICD-10-CM: E78.00 ICD-9-CM: 272.0 Vitamin D deficiency     ICD-10-CM: E55.9 ICD-9-CM: 268.9 Vitals BP Pulse Temp Resp Height(growth percentile) Weight(growth percentile) 138/85 (BP 1 Location: Left arm, BP Patient Position: Sitting) 87 98.2 °F (36.8 °C) (Oral) 18 5' 5\" (1.651 m) 188 lb (85.3 kg) SpO2 BMI OB Status Smoking Status 98% 31.28 kg/m2 Hysterectomy Never Smoker Vitals History BMI and BSA Data Body Mass Index Body Surface Area  
 31.28 kg/m 2 1.98 m 2 Preferred Pharmacy Pharmacy Name Phone [id number] [place], [address] [facility],[address] Floor [facility] [phone] Your Updated Medication List  
  
   
This list is accurate as of: 5/16/17  4:04 PM.  Always use your most recent med list.  
  
  
  
  
 Cholecalciferol (Vitamin D3) 2,000 unit Cap capsule Commonly known as:  VITAMIN D Take 2,000 Units by mouth daily. CLINDESSE 2 % SR vaginal cream  
Generic drug:  clindamycin  
  
 cloNIDine 0.1 mg/24 hr patch Commonly known as:  CATAPRES  
APPLY 1 PATCH BY TRANSDERMAL ROUTE EVERY 7 DAYS. cpap machine kit  
by Does Not Apply route. Replace mask, headgear, heated hose, filters and accessories as needed for one year. CPAP pressure: 9 cwp Mask: standard Mirage FX plus Opus nasal pillows mask, or mask of choice [employer]: MED Download data 30 days after initiation of therapy  
  
 cyclobenzaprine 5 mg tablet Commonly known as:  FLEXERIL  
TAKE 1-2 TABLETS BY MOUTH AT NIGHT. diphenhydrAMINE 25 mg capsule Commonly known as:  BENADRYL Take 1 capsule by mouth nightly as needed. lansoprazole 30 mg capsule Commonly known as:  PREVACID TAKE 1 CAPSULE BY MOUTH DAILY (BEFORE BREAKFAST). levalbuterol tartrate 45 mcg/actuation inhaler Commonly known as:  XOPENEX  
INHALE 1-2 PUFFS EVERY 4 TO 6 HOURS AS NEEDED  
  
 lisinopril-hydroCHLOROthiazide 20-12.5 mg per tablet Commonly known as:  PRINZIDE, ZESTORETIC  
TAKE 1 TABLET BY MOUTH ONCE DAILY lorcaserin 10 mg Tab Commonly known as:  Kathlynn  Take 10 mg by mouth two (2) times a day. Max Daily Amount: 20 mg.  
  
 meclizine 25 mg tablet Commonly known as:  ANTIVERT  
TAKE 1 TAB BY MOUTH THREE (3) TIMES DAILY AS NEEDED FOR UP TO 10 DAYS. MILK THISTLE PO Take 1 Cap by mouth daily. ondansetron hcl 4 mg tablet Commonly known as:  ZOFRAN (AS HYDROCHLORIDE) Take 1 Tab by mouth every eight (8) hours as needed for Nausea. * OTHER Tumeric 1 cap daily * OTHER  
alestrian hormone cream  
  
 PROBIOTIC PO Take  by mouth. raNITIdine 300 mg tablet Commonly known as:  ZANTAC Take 300 mg by mouth daily as needed. simvastatin 20 mg tablet Commonly known as:  ZOCOR  
 TAKE 1 TABLET BY MOUTH AT BEDTIME  
  
 valACYclovir 500 mg tablet Commonly known as:  VALTREX Take 500 mg by mouth daily. vitamin e 400 unit Tab Take 1 Cap by mouth daily. YUVAFEM 10 mcg Tab vaginal tablet Generic drug:  estradiol INSERT 1 APPLICATORFUL VAGINALLY AT BEDTIME FOR 14 DAYS THEN TWICE PER WEEK * Notice: This list has 2 medication(s) that are the same as other medications prescribed for you. Read the directions carefully, and ask your doctor or other care provider to review them with you. Prescriptions Sent to Pharmacy Refills  
 lisinopril-hydroCHLOROthiazide (PRINZIDE, ZESTORETIC) 20-12.5 mg per tablet 2 Sig: TAKE 1 TABLET BY MOUTH ONCE DAILY Class: Normal  
 Pharmacy: [facility]/pharmacy [address] [place] [address], [address] [place] [address],[address] #: [phone] Follow-up Instructions Return in about 3 months (around 8/16/2017) for labs 1 week before. Introducing [facility]! Juliette Hickman introduces CDEL patient portal. Now you can access parts of your medical record, email your doctor's office, and request medication refills online. 1. In your internet browser, go to [email or web address] 2. Click on the First Time User? Click Here link in the Sign In box. You will see the New Member Sign Up page. 3. Enter your CDEL Access Code exactly as it appears below. You will not need to use this code after youve completed the sign-up process. If you do not sign up before the expiration date, you must request a new code. · CDEL Access Code: [email or web address] Expires: 5/25/2017  5:51 PM 
 
4. Enter the last four digits of your Social Security Number (xxxx) and Date of Birth (mm/dd/yyyy) as indicated and click Submit. You will be taken to the next sign-up page. 5. Create a Kaye Groupt ID. This will be your [employer]pt login ID and cannot be changed, so think of one that is secure and easy to remember. 6. Create a Tasqe password. You can change your password at any time. 7. Enter your Password Reset Question and Answer. This can be used at a later time if you forget your password. 8. Enter your e-mail address. You will receive e-mail notification when new information is available in [address] 9. Click Sign Up. You can now view and download portions of your medical record. 10. Click the Download Summary menu link to download a portable copy of your medical information. If you have questions, please visit the Frequently Asked Questions section of the Tasqe website. Remember, Tasqe is NOT to be used for urgent needs. For medical emergencies, dial 911. Now available from your iPhone and Android! Please provide this summary of care documentation to your next provider. Your primary care clinician is listed as ZAKIYA BEGUM. If you have any questions after today's visit, please call [phone].

## 2022-08-18 ENCOUNTER — TELEPHONE (OUTPATIENT)
Dept: ONCOLOGY | Age: 67
End: 2022-08-18

## 2022-08-29 RX ORDER — CLONIDINE HYDROCHLORIDE 0.1 MG/1
TABLET ORAL
Qty: 60 TABLET | OUTPATIENT
Start: 2022-08-29

## 2022-09-06 ENCOUNTER — HOSPITAL ENCOUNTER (OUTPATIENT)
Dept: INFUSION THERAPY | Age: 67
Discharge: HOME | End: 2022-09-06
Payer: MEDICARE

## 2022-09-06 ENCOUNTER — OFFICE VISIT (OUTPATIENT)
Dept: ONCOLOGY | Age: 67
End: 2022-09-06
Payer: MEDICARE

## 2022-09-06 VITALS
BODY MASS INDEX: 21.59 KG/M2 | HEIGHT: 65 IN | OXYGEN SATURATION: 100 % | WEIGHT: 129.6 LBS | SYSTOLIC BLOOD PRESSURE: 143 MMHG | TEMPERATURE: 99.2 F | DIASTOLIC BLOOD PRESSURE: 62 MMHG

## 2022-09-06 VITALS
BODY MASS INDEX: 21.59 KG/M2 | RESPIRATION RATE: 18 BRPM | SYSTOLIC BLOOD PRESSURE: 143 MMHG | OXYGEN SATURATION: 100 % | HEART RATE: 63 BPM | WEIGHT: 129.6 LBS | TEMPERATURE: 99.2 F | DIASTOLIC BLOOD PRESSURE: 62 MMHG | HEIGHT: 65 IN

## 2022-09-06 DIAGNOSIS — E83.52 HYPERCALCEMIA: ICD-10-CM

## 2022-09-06 DIAGNOSIS — D72.819 LEUKOPENIA, UNSPECIFIED TYPE: ICD-10-CM

## 2022-09-06 DIAGNOSIS — E83.52 HYPERCALCEMIA: Primary | ICD-10-CM

## 2022-09-06 DIAGNOSIS — Z51.11 ENCOUNTER FOR CHEMOTHERAPY MANAGEMENT: ICD-10-CM

## 2022-09-06 DIAGNOSIS — C53.9 MALIGNANT NEOPLASM OF CERVIX, UNSPECIFIED SITE (HCC): Primary | ICD-10-CM

## 2022-09-06 DIAGNOSIS — D64.9 CHRONIC ANEMIA: ICD-10-CM

## 2022-09-06 DIAGNOSIS — C53.9 MALIGNANT NEOPLASM OF CERVIX, UNSPECIFIED SITE (HCC): ICD-10-CM

## 2022-09-06 DIAGNOSIS — N18.4 CHRONIC RENAL INSUFFICIENCY, STAGE 4 (SEVERE) (HCC): ICD-10-CM

## 2022-09-06 LAB
ABSOLUTE IMMATURE GRANULOCYTE: 0.02 THOU/MM3 (ref 0–0.07)
BASINOPHIL, AUTOMATED: 0 % (ref 0–3)
BASOPHILS ABSOLUTE: 0 THOU/MM3 (ref 0–0.1)
BUN, WHOLE BLOOD: 23 MG/DL (ref 8–26)
CALCIUM SERPL-MCNC: 10.3 MG/DL (ref 8.5–10.5)
CHLORIDE, WHOLE BLOOD: 99 MEQ/L (ref 98–109)
CREATININE, WHOLE BLOOD: 4.8 MG/DL (ref 0.5–1.2)
EOSINOPHILS ABSOLUTE: 0.1 THOU/MM3 (ref 0–0.4)
EOSINOPHILS RELATIVE PERCENT: 2 % (ref 0–4)
GFR, ESTIMATED ,CON: 10 ML/MIN/1.73M2
GLUCOSE, WHOLE BLOOD: 92 MG/DL (ref 70–108)
HCT VFR BLD CALC: 36.3 % (ref 37–47)
HEMOGLOBIN: 11.5 GM/DL (ref 12–16)
IMMATURE GRANULOCYTES: 0 %
IONIZED CALCIUM, WHOLE BLOOD: 1.15 MMOL/L (ref 1.12–1.32)
LYMPHOCYTES # BLD: 16 % (ref 15–47)
LYMPHOCYTES ABSOLUTE: 0.8 THOU/MM3 (ref 1–4.8)
MCH RBC QN AUTO: 29.2 PG (ref 26–33)
MCHC RBC AUTO-ENTMCNC: 31.7 GM/DL (ref 32.2–35.5)
MCV RBC AUTO: 92 FL (ref 81–99)
MONOCYTES ABSOLUTE: 0.7 THOU/MM3 (ref 0.4–1.3)
MONOCYTES: 14 % (ref 0–12)
PDW BLD-RTO: 14.9 % (ref 11.5–14.5)
PLATELET # BLD: 279 THOU/MM3 (ref 130–400)
PMV BLD AUTO: 9.1 FL (ref 9.4–12.4)
POTASSIUM, WHOLE BLOOD: 4.3 MEQ/L (ref 3.5–4.9)
RBC # BLD: 3.94 MILL/MM3 (ref 4.2–5.4)
SEG NEUTROPHILS: 67 % (ref 43–75)
SEGMENTED NEUTROPHILS ABSOLUTE COUNT: 3.2 THOU/MM3 (ref 1.8–7.7)
SODIUM, WHOLE BLOOD: 137 MEQ/L (ref 138–146)
TOTAL CO2, WHOLE BLOOD: 28 MEQ/L (ref 23–33)
WBC # BLD: 4.7 THOU/MM3 (ref 4.8–10.8)

## 2022-09-06 PROCEDURE — 82310 ASSAY OF CALCIUM: CPT

## 2022-09-06 PROCEDURE — 99211 OFF/OP EST MAY X REQ PHY/QHP: CPT

## 2022-09-06 PROCEDURE — 96372 THER/PROPH/DIAG INJ SC/IM: CPT

## 2022-09-06 PROCEDURE — G8427 DOCREV CUR MEDS BY ELIG CLIN: HCPCS | Performed by: INTERNAL MEDICINE

## 2022-09-06 PROCEDURE — 85025 COMPLETE CBC W/AUTO DIFF WBC: CPT

## 2022-09-06 PROCEDURE — 1090F PRES/ABSN URINE INCON ASSESS: CPT | Performed by: INTERNAL MEDICINE

## 2022-09-06 PROCEDURE — 1123F ACP DISCUSS/DSCN MKR DOCD: CPT | Performed by: INTERNAL MEDICINE

## 2022-09-06 PROCEDURE — 1036F TOBACCO NON-USER: CPT | Performed by: INTERNAL MEDICINE

## 2022-09-06 PROCEDURE — 6360000002 HC RX W HCPCS: Performed by: INTERNAL MEDICINE

## 2022-09-06 PROCEDURE — 99214 OFFICE O/P EST MOD 30 MIN: CPT | Performed by: INTERNAL MEDICINE

## 2022-09-06 PROCEDURE — G8400 PT W/DXA NO RESULTS DOC: HCPCS | Performed by: INTERNAL MEDICINE

## 2022-09-06 PROCEDURE — 80047 BASIC METABLC PNL IONIZED CA: CPT

## 2022-09-06 PROCEDURE — G8420 CALC BMI NORM PARAMETERS: HCPCS | Performed by: INTERNAL MEDICINE

## 2022-09-06 PROCEDURE — 3017F COLORECTAL CA SCREEN DOC REV: CPT | Performed by: INTERNAL MEDICINE

## 2022-09-06 RX ADMIN — DENOSUMAB 120 MG: 120 INJECTION SUBCUTANEOUS at 10:47

## 2022-09-06 NOTE — PROGRESS NOTES
Xgeva given as charted, tolerated well. Discharged in satisfactory condition.  Ambulated off unit per self

## 2022-09-06 NOTE — PLAN OF CARE
Care plan reviewed with patient. Patient verbalized understanding of the plan of care and contribute to goal setting. Problem: Safety - Adult  Goal: Free from fall injury  Outcome: Adequate for Discharge  Flowsheets (Taken 9/6/2022 1748)  Free From Fall Injury:   Instruct family/caregiver on patient safety   Based on caregiver fall risk screen, instruct family/caregiver to ask for assistance with transferring infant if caregiver noted to have fall risk factors  Note: Free from falls while in infusion center. Verbalized understanding of fall prevention and to ask for assistance with ambulation      Problem: Discharge Planning  Goal: Discharge to home or other facility with appropriate resources  Outcome: Adequate for Discharge  Flowsheets (Taken 9/6/2022 1748)  Discharge to home or other facility with appropriate resources:   Identify barriers to discharge with patient and caregiver   Identify discharge learning needs (meds, wound care, etc)   Arrange for needed discharge resources and transportation as appropriate  Note: Verbalized understanding of discharge instructions, follow ups and when to call doctor      Problem: Chronic Conditions and Co-morbidities  Goal: Patient's chronic conditions and co-morbidity symptoms are monitored and maintained or improved  Outcome: Adequate for Discharge  Flowsheets (Taken 9/6/2022 1748)  Care Plan - Patient's Chronic Conditions and Co-Morbidity Symptoms are Monitored and Maintained or Improved:   Monitor and assess patient's chronic conditions and comorbid symptoms for stability, deterioration, or improvement   Collaborate with multidisciplinary team to address chronic and comorbid conditions and prevent exacerbation or deterioration  Note: Patient verbalizes understanding to verbal information given on Xgeva,action and possible side effects. Aware to call MD if develop complications.

## 2022-09-06 NOTE — DISCHARGE INSTRUCTIONS
Please contact your Oncologist if you have any questions regarding the Dalal [place] that you received today. Patient instructed if experience any of the symptoms following today's Xgeva/ to notify MD immediately or go to emergency department.     * dizziness/lightheadedness  *acute nausea/vomiting - not relieved with medication  *headache - not relieved from Tylenol/pain medication  *chest pain/pressure  *rash/itching  *shortness of breath        Drink fluids - 48oz fluids daily  Call if develop fever/ chills/ signs or symptoms of infection

## 2022-09-06 NOTE — PROGRESS NOTES
[facility] CAN[facility]ENTER  CANCER NETWORK OF [facility]  [facility] [address] [facility] 98  [address]  [address]  Dept: [phone]  Dept Fax: [phone]  Loc: [phone]    Subjective:      Chief Complaint:  Pee Stephens is a 75 y.o. female with cervical cancer. In June 2019, the patient began to develop a vague lower abdominal pain. This is associated sign and symptom progressively became worse to the point that it was severe. In July 2019 the patient developed vaginal bleeding which she describes also severe. This prompted her to present to the emergency department at [facility] in [place]. A pelvic ultrasound was completed which found fullness of the lower uterine segment. A follow-up CT scan of the abdomen pelvis did not show any area of lymphadenopathy or other intra-abdominal or pelvic findings. Eventually an MRI scan of the pelvis was completed which displayed a 5 cm cervical neoplasm with invasion of the parametrial and upper vagina region. There was noted obstruction of the endocervical canal with dilatation of the endometrial cavity. In context to this condition, the patient was then referred to [facility] gynecological oncology for further evaluation. Modifying factor affecting this condition was that on October 26, 2019 the patient underwent biopsy of the exocervix, endocervix, and endometrium. Surgical pathology confirmed squamous cell carcinoma. A follow-up PET scan then was completed which found intense abnormal metabolic activity associated with the primary malignancy but no evidence of metastatic spread of malignancy. 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 HPI:   Toro Laury is here today for follow-up regarding a history of cervical cancer and a history of hypercalcemia.   The patient also has renal failure and is followed by nephrology. In regards to her cervical cancer she received radiation therapy and chemotherapy treatment. There has not been evidence of recurrence of her malignancy although the patient has been reluctant to proceed with CT scans to evaluate for recurrent malignancy. Her hypercalcemia appears to be related to hyperparathyroidism but the patient also is reluctant to proceed with evaluation of the parathyroids. On today's evaluation her calcium level is within normal limits. The patient does have chronic leukopenia and chronic anemia but each of these of been relatively stable. She does not report evidence of blood loss. Her bowel bladder habits have been fairly stable. Her overall sense of wellbeing is also been fairly stable although she does complain of chronic fatigue and generalized weakness. Her ECOG performance status is level 0-1. PMH, SH, and FH:  I reviewed the patients medication list and allergy list as noted on the electronic medical record. The PMH, SH and FH were also reviewed as noted on the EMR. Review of Systems:  Constitutional: Fatigue. HENT: Negative. Eyes: Negative. Respiratory: Negative. Cardiovascular: Negative. Gastrointestinal: Negative. Genitourinary: Negative. Musculoskeletal: Negative. Skin: Negative. Neurological: General weakness. Hematological: Negative. Psychiatric/Behavioral: Negative. Objective:   Physical Exam  Vitals:    09/06/22 0946   BP: (!) 143/62   Pulse: 63   Resp: 18   Temp: 99.2 °F (37.3 °C)   SpO2: 100%   Vitals reviewed and are stable. Constitutional: Well-developed. No acute distress. HENT: Normocephalic and atraumatic. Eyes: Pupils appear equal and reactive. Neck: Overall appearance is symmetrical. No identifiable masses. Pulmonary: Effort normal. No respiratory distress. .  Neurological: Alert and oriented to person, place, and time. Judgment and thought content normal.  Skin: Skin is warm and dry. No rash. Psychiatric: Mood and affect appropriate for the clinical situation. Data Analysis: The following laboratory studies were reviewed with the patient today:    Hematology 9/6/2022 9/14/2021 7/20/2021   WBC 4.7 (L) 6.7 5.2   RBC 3.94 (L) 3.96 (L) 3.48 (L)   HGB 11.5 (L) 11.6 (L) 10.3 (L)   HCT 36.3 (L) 35.6 (L) 32.1 (L)   MCV 92 90 92   RDW 14.9 (H) 17.3 (H) 15.2 (H)    234 228     Assessment:   1. 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 Hypercalcemia. 3.  Renal failure. 4.  Chronic anemia. 5.  Leukopenia. Plan:   1. Monitor for recurrence of malignancy. 2.  Monitor hemoglobin/hematocrit and for any signs of blood loss. .  3.  Continue erythropoietin therapy as outlined by her nephrologist with dialysis. 4.  Follow-up with nephrology for continued management. 5.  Patient will consider a neck ultrasound to evaluate for enlarged parathyroid. Camelia Rivera M.D. Medical Director: [facility]  Cancer Network [address] [facility], [address] [place] [address], [address] [place] [address] of the [facility] the [facility]      **This report has been created using voice recognition software. It may contain minor errors which are inherent in voice recognition technology. **

## 2023-01-10 ENCOUNTER — OFFICE VISIT (OUTPATIENT)
Dept: CARDIOLOGY CLINIC | Age: 68
End: 2023-01-10
Payer: MEDICARE

## 2023-01-10 VITALS
WEIGHT: 130 LBS | HEART RATE: 68 BPM | SYSTOLIC BLOOD PRESSURE: 132 MMHG | DIASTOLIC BLOOD PRESSURE: 60 MMHG | HEIGHT: 65 IN | BODY MASS INDEX: 21.66 KG/M2

## 2023-01-10 DIAGNOSIS — R00.1 BRADYCARDIA: ICD-10-CM

## 2023-01-10 DIAGNOSIS — I10 PRIMARY HYPERTENSION: Primary | ICD-10-CM

## 2023-01-10 PROCEDURE — 3075F SYST BP GE 130 - 139MM HG: CPT | Performed by: NUCLEAR MEDICINE

## 2023-01-10 PROCEDURE — G8420 CALC BMI NORM PARAMETERS: HCPCS | Performed by: NUCLEAR MEDICINE

## 2023-01-10 PROCEDURE — 1036F TOBACCO NON-USER: CPT | Performed by: NUCLEAR MEDICINE

## 2023-01-10 PROCEDURE — 3078F DIAST BP <80 MM HG: CPT | Performed by: NUCLEAR MEDICINE

## 2023-01-10 PROCEDURE — 99213 OFFICE O/P EST LOW 20 MIN: CPT | Performed by: NUCLEAR MEDICINE

## 2023-01-10 PROCEDURE — 1090F PRES/ABSN URINE INCON ASSESS: CPT | Performed by: NUCLEAR MEDICINE

## 2023-01-10 PROCEDURE — 1123F ACP DISCUSS/DSCN MKR DOCD: CPT | Performed by: NUCLEAR MEDICINE

## 2023-01-10 PROCEDURE — G8427 DOCREV CUR MEDS BY ELIG CLIN: HCPCS | Performed by: NUCLEAR MEDICINE

## 2023-01-10 PROCEDURE — G8400 PT W/DXA NO RESULTS DOC: HCPCS | Performed by: NUCLEAR MEDICINE

## 2023-01-10 PROCEDURE — 3017F COLORECTAL CA SCREEN DOC REV: CPT | Performed by: NUCLEAR MEDICINE

## 2023-01-10 PROCEDURE — G8484 FLU IMMUNIZE NO ADMIN: HCPCS | Performed by: NUCLEAR MEDICINE

## 2023-01-10 NOTE — PROGRESS NOTES
Pt C/O swelling due to kidney disease       Pt denies CP, SOB, Headache, dizziness, heart palpitations, fatigue

## 2023-01-10 NOTE — PROGRESS NOTES
[address] [address] [address] [place] [address] [facility]  Dept: [phone]  Dept Fax: [phone]  Loc: [phone]    Visit Date: 1/10/2023    Job Urbina is a 75 y.o. female who presents todayfor:  Chief Complaint   Patient presents with    Follow-up     Hr running low at dialysis     Hypertension     Was running lower HR   Meds adjusted   BP went up   Back on same meds  HR in the 50s  On dialysis   No dizziness  No syncope  No chest pain   No changes in breathing      HPI:  HPI  Past Medical History:   Diagnosis Date    Anxiety     Diarrhea     Dryness of periwound skin     Gout, joint     Hernia of abdominal cavity     Hypertension     Kidney disease     Mouth dryness       Past Surgical History:   Procedure Laterality Date    AV FISTULA CREATION Left     URETER STENT PLACEMENT  11/2019     Family History   Problem Relation Age of Onset    Cancer Father     No Known Problems Sister     No Known Problems Brother      Social History     Tobacco Use    Smoking status: Never    Smokeless tobacco: Never   Substance Use Topics    Alcohol use: Not Currently      Current Outpatient Medications   Medication Sig Dispense Refill    diazePAM (VALIUM) 5 MG tablet Take 5 mg by mouth every 6 hours as needed for Anxiety (Takes on dialysis days).       B Complex-C-Folic Acid (RENAL-KIESHA) 0.8 MG TABS Take 1 tablet by mouth daily      cloNIDine (CATAPRES) 0.2 MG tablet Take 0.2 mg by mouth 3 times daily       minoxidil (LONITEN) 2.5 MG tablet Take 2.5 mg by mouth 2 times daily      aspirin 81 MG EC tablet Take 81 mg by mouth daily      NIFEdipine (ADALAT CC) 60 MG extended release tablet Take 60 mg by mouth 2 times daily   3    levothyroxine (SYNTHROID) 125 MCG tablet Take 114 mcg by mouth Daily      temazepam (RESTORIL) 30 MG capsule TAKE 1 CAPSULE BY MOUTH ONCE DAILY AT BEDTIME AS NEEDED FOR SLEEP  2     No current facility-administered medications for this visit. Allergies   Allergen Reactions    Clopidogrel      Other reaction(s): Rash    Dye [Iodides] Swelling and Rash     Health Maintenance   Topic Date Due    Depression Screen  Never done    DTaP/Tdap/Td vaccine (1 - Tdap) Never done    Shingles vaccine (1 of 2) Never done    Breast cancer screen  Never done    DEXA (modify frequency per FRAX score)  Never done    Annual Wellness Visit (AWV)  Never done    Colorectal Cancer Screen  11/27/2021    COVID-19 Vaccine (4 - Booster for Moderna series) 01/07/2022    GFR test (Diabetes, CKD 3-4, OR last GFR 15-59)  09/06/2023    Lipids  08/05/2025    Flu vaccine  Completed    Pneumococcal 65+ years Vaccine  Completed    Hepatitis C screen  Completed    Hepatitis A vaccine  Aged Out    Hib vaccine  Aged Out    Meningococcal (ACWY) vaccine  Aged Out       Subjective:  Review of Systems  General:   No fever, no chills, No fatigue or weight loss  Pulmonary:    No dyspnea, no wheezing  Cardiac:    Denies recent chest pain,   GI:     No nausea or vomiting, no abdominal pain  Neuro:    No dizziness or light headedness,   Musculoskeletal:  No recent active issues  Extremities:   No edema, no obvious claudication     Objective:  Physical Exam  /60   Pulse 68   Ht 5' 5\" (1.651 m)   Wt 130 lb (59 kg)   BMI 21.63 kg/m²   General:   Well developed, well nourished  Lungs:   Clear to auscultation  Heart:    Normal S1 S2, Slight murmur. no rubs, no gallops  Abdomen:   Soft, non tender, no organomegalies, positive bowel sounds  Extremities:   No edema, no cyanosis, good peripheral pulses  Neurological:   Awake, alert, oriented. No obvious focal deficits  Musculoskelatal:  No obvious deformities    Assessment:      Diagnosis Orders   1. Primary hypertension        2. Bradycardia        As above  Cardiac fair for now     Plan:  No follow-ups on file.   As above  Continue risk factor modification and medical management  Thank you for allowing me to participate in the care of your patient. Please don't hesitate to contact me regarding any further issues related to the patient care    Orders Placed:  No orders of the defined types were placed in this encounter.      Medications Prescribed:  No orders of the defined types were placed in this encounter.         Discussed use, benefit, and side effects of prescribed medications. All patient questions answered. Pt voicedunderstanding. Instructed to continue current medications, diet and exercise. Continue risk factor modification and medical management. Patient agreed with treatment plan. Follow up as directed.    Electronically signedby Cristian Gomez MD on 1/10/2023 at 12:47 PM

## 2023-02-22 ENCOUNTER — HOSPITAL ENCOUNTER (OUTPATIENT)
Dept: INFUSION THERAPY | Age: 68
Discharge: HOME | End: 2023-02-22
Payer: MEDICARE

## 2023-02-22 ENCOUNTER — OFFICE VISIT (OUTPATIENT)
Dept: ONCOLOGY | Age: 68
End: 2023-02-22
Payer: MEDICARE

## 2023-02-22 VITALS
TEMPERATURE: 97.8 F | OXYGEN SATURATION: 98 % | SYSTOLIC BLOOD PRESSURE: 160 MMHG | WEIGHT: 139.6 LBS | HEIGHT: 65 IN | HEART RATE: 73 BPM | RESPIRATION RATE: 20 BRPM | DIASTOLIC BLOOD PRESSURE: 73 MMHG | BODY MASS INDEX: 23.26 KG/M2

## 2023-02-22 VITALS
DIASTOLIC BLOOD PRESSURE: 73 MMHG | TEMPERATURE: 97.8 F | WEIGHT: 139.6 LBS | RESPIRATION RATE: 20 BRPM | OXYGEN SATURATION: 98 % | SYSTOLIC BLOOD PRESSURE: 160 MMHG | HEART RATE: 73 BPM | BODY MASS INDEX: 23.23 KG/M2

## 2023-02-22 DIAGNOSIS — Z51.11 ENCOUNTER FOR CHEMOTHERAPY MANAGEMENT: ICD-10-CM

## 2023-02-22 DIAGNOSIS — C53.9 MALIGNANT NEOPLASM OF CERVIX, UNSPECIFIED SITE (HCC): Primary | ICD-10-CM

## 2023-02-22 DIAGNOSIS — C53.9 MALIGNANT NEOPLASM OF CERVIX, UNSPECIFIED SITE (HCC): ICD-10-CM

## 2023-02-22 DIAGNOSIS — E83.52 HYPERCALCEMIA: Primary | ICD-10-CM

## 2023-02-22 LAB
ALBUMIN SERPL BCG-MCNC: 4.3 G/DL (ref 3.5–5.1)
ALP SERPL-CCNC: 84 U/L (ref 38–126)
ALT SERPL W/O P-5'-P-CCNC: 6 U/L (ref 11–66)
AST SERPL-CCNC: 12 U/L (ref 5–40)
BASOPHILS # BLD AUTO: 0 THOU/MM3 (ref 0–0.1)
BASOPHILS NFR BLD AUTO: 0 % (ref 0–3)
BILIRUB CONJ SERPL-MCNC: < 0.2 MG/DL (ref 0–0.3)
BILIRUB SERPL-MCNC: 0.4 MG/DL (ref 0.3–1.2)
BUN BLDP-MCNC: 47 MG/DL (ref 8–26)
CHLORIDE BLD-SCNC: 98 MEQ/L (ref 98–109)
CREAT BLD-MCNC: 5.7 MG/DL (ref 0.5–1.2)
EOSINOPHIL # BLD AUTO: 0.2 THOU/MM3 (ref 0–0.4)
EOSINOPHIL NFR BLD AUTO: 2 % (ref 0–4)
ERYTHROCYTE [DISTWIDTH] IN BLOOD BY AUTOMATED COUNT: 14.6 % (ref 11.5–14.5)
GFR SERPL CREATININE-BSD FRML MDRD: 8 ML/MIN/1.73M2
GLUCOSE BLD-MCNC: 98 MG/DL (ref 70–108)
HCT VFR BLD AUTO: 32.7 % (ref 37–47)
HGB BLD-MCNC: 10.9 GM/DL (ref 12–16)
IMM GRANULOCYTES # BLD AUTO: 0.09 THOU/MM3 (ref 0–0.07)
IMM GRANULOCYTES NFR BLD AUTO: 1 %
IONIZED CALCIUM, WHOLE BLOOD: 1.44 MMOL/L (ref 1.12–1.32)
LYMPHOCYTES # BLD AUTO: 1.4 THOU/MM3 (ref 1–4.8)
LYMPHOCYTES NFR BLD AUTO: 19 % (ref 15–47)
MCH RBC QN AUTO: 30.5 PG (ref 26–33)
MCHC RBC AUTO-ENTMCNC: 33.3 GM/DL (ref 32.2–35.5)
MCV RBC AUTO: 92 FL (ref 81–99)
MONOCYTES # BLD AUTO: 1.1 THOU/MM3 (ref 0.4–1.3)
MONOCYTES NFR BLD AUTO: 15 % (ref 0–12)
NEUTROPHILS NFR BLD AUTO: 63 % (ref 43–75)
PLATELET # BLD AUTO: 251 THOU/MM3 (ref 130–400)
PMV BLD AUTO: 9.3 FL (ref 9.4–12.4)
POTASSIUM BLD-SCNC: 4.4 MEQ/L (ref 3.5–4.9)
PROT SERPL-MCNC: 6.9 G/DL (ref 6.1–8)
RBC # BLD AUTO: 3.57 MILL/MM3 (ref 4.2–5.4)
SEGMENTED NEUTROPHILS ABSOLUTE COUNT: 4.6 THOU/MM3 (ref 1.8–7.7)
SODIUM BLD-SCNC: 133 MEQ/L (ref 138–146)
TOTAL CO2, WHOLE BLOOD: 26 MEQ/L (ref 23–33)
WBC # BLD AUTO: 7.3 THOU/MM3 (ref 4.8–10.8)

## 2023-02-22 PROCEDURE — G8400 PT W/DXA NO RESULTS DOC: HCPCS | Performed by: INTERNAL MEDICINE

## 2023-02-22 PROCEDURE — 96372 THER/PROPH/DIAG INJ SC/IM: CPT

## 2023-02-22 PROCEDURE — G8427 DOCREV CUR MEDS BY ELIG CLIN: HCPCS | Performed by: INTERNAL MEDICINE

## 2023-02-22 PROCEDURE — 80047 BASIC METABLC PNL IONIZED CA: CPT

## 2023-02-22 PROCEDURE — 1123F ACP DISCUSS/DSCN MKR DOCD: CPT | Performed by: INTERNAL MEDICINE

## 2023-02-22 PROCEDURE — G8420 CALC BMI NORM PARAMETERS: HCPCS | Performed by: INTERNAL MEDICINE

## 2023-02-22 PROCEDURE — 1036F TOBACCO NON-USER: CPT | Performed by: INTERNAL MEDICINE

## 2023-02-22 PROCEDURE — 99211 OFF/OP EST MAY X REQ PHY/QHP: CPT

## 2023-02-22 PROCEDURE — 99214 OFFICE O/P EST MOD 30 MIN: CPT | Performed by: INTERNAL MEDICINE

## 2023-02-22 PROCEDURE — 3017F COLORECTAL CA SCREEN DOC REV: CPT | Performed by: INTERNAL MEDICINE

## 2023-02-22 PROCEDURE — 6360000002 HC RX W HCPCS: Performed by: INTERNAL MEDICINE

## 2023-02-22 PROCEDURE — G8484 FLU IMMUNIZE NO ADMIN: HCPCS | Performed by: INTERNAL MEDICINE

## 2023-02-22 PROCEDURE — 1090F PRES/ABSN URINE INCON ASSESS: CPT | Performed by: INTERNAL MEDICINE

## 2023-02-22 PROCEDURE — 80076 HEPATIC FUNCTION PANEL: CPT

## 2023-02-22 PROCEDURE — 3077F SYST BP >= 140 MM HG: CPT | Performed by: INTERNAL MEDICINE

## 2023-02-22 PROCEDURE — 3078F DIAST BP <80 MM HG: CPT | Performed by: INTERNAL MEDICINE

## 2023-02-22 PROCEDURE — 85025 COMPLETE CBC W/AUTO DIFF WBC: CPT

## 2023-02-22 RX ADMIN — DENOSUMAB 120 MG: 120 INJECTION SUBCUTANEOUS at 12:08

## 2023-02-22 ASSESSMENT — PAIN DESCRIPTION - ORIENTATION: ORIENTATION: RIGHT;LEFT

## 2023-02-22 ASSESSMENT — PAIN DESCRIPTION - DESCRIPTORS: DESCRIPTORS: ACHING;DISCOMFORT

## 2023-02-22 ASSESSMENT — PAIN SCALES - GENERAL: PAINLEVEL_OUTOF10: 7

## 2023-02-22 ASSESSMENT — PAIN DESCRIPTION - LOCATION: LOCATION: LEG

## 2023-02-22 NOTE — PATIENT INSTRUCTIONS
Labs today. Possible xgeva  Pt had  multiple scans - CT, MRI done [facility].  Please get records  CT pelvis 6 months  RTC  6 months

## 2023-02-22 NOTE — PROGRESS NOTES
[facility] CANCER CENTER  CANCER NETWORK OF [facility]  [facility] [address] [facility] 98  [address] [facility] [address] [facility] [address]  Dept: [phone]  Dept Fax: [phone]: [phone]     Encounter Date: 2/22/2023     Primary Provider: MEGAN Coello     HPI:  Sanaz Coreas is a very pleasant 75 y.o. female followed for cervical cancer. Patient initially presented in June 2019 with lower abdominal pain. July 2019 patient developed vaginal bleeding, and presented to [facility]. Ultrasound confirmed fullness in the lower uterine segment. MRI scan of the pelvis was completed which displayed a 5 cm cervical neoplasm with invasion of the parametrial and upper vagina region. There was noted obstruction of the endocervical canal with dilatation of the endometrial cavity. In context to this condition, the patient was then referred to [facility] gynecological oncology for further evaluation. October 26, 2019 the patient underwent biopsy of the exocervix, endocervix, and endometrium. Surgical pathology confirmed squamous cell carcinoma. A follow-up PET scan then was completed which found intense abnormal metabolic activity associated with the primary malignancy but no evidence of metastatic spread of malignancy. 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 Patient has history of hypercalcemia. The patient also has renal failure (on dialysis ) and is followed by nephrology. She is on Aranesp support with IV iron as needed. She is getting Xgeva periodically for hypercalcemia    Review of Systems  Constitutional: Negative. HENT: Negative. Eyes: Negative. Respiratory: Negative. Cardiovascular: Negative. Gastrointestinal: Negative. Genitourinary: Negative. Musculoskeletal: Negative. Skin: Negative. Neurological: Negative.    Hematological: Negative. Psychiatric/Behavioral: Negative. Past Medical History   has a past medical history of Anxiety, Diarrhea, Dryness of periwound skin, Gout, joint, Hernia of abdominal cavity, Hypertension, Kidney disease, and Mouth dryness. Surgical History   has a past surgical history that includes Ureter stent placement (11/2019) and AV fistula creation (Left). Home Medications  has a current medication list which includes the following prescription(s): diazepam, renal-marcela, clonidine, minoxidil, aspirin, nifedipine, levothyroxine, and temazepam.    Allergies  Allergies   Allergen Reactions    Clopidogrel      Other reaction(s): Rash    Dye [Iodides] Swelling and Rash       Social History   reports that she has never smoked. She has never used smokeless tobacco. She reports that she does not currently use alcohol. She reports that she does not use drugs. Family History  family history includes Cancer in her father; No Known Problems in her brother and sister. Labs: Reviewed    Physical Exam  Vitals:    02/22/23 1019   BP: (!) 160/73   Pulse: 73   Resp: 20   Temp: 97.8 °F (36.6 °C)   SpO2: 98%      General: Non-ill appearing. HEENT: NC/AT,nonicteric, perrla,eom intact, no mucosal lesions  Neck: normal thyroid, no masses  Nodes: No adenopathy  Lungs/chest: clear, no rales,rhonchi or wheezing, lung bases clear  CV: rrr, no rubs ,gallops or murmurs  Abdomen: soft, non-tender,bowel sounds normal , no palpable hepatosplenomegaly  Back: normal curvature, No midline tenderness. flanks nontender  : Not Examined  Extremities: no cyanosis,clubbing or edema. Skin: unremarkable  Neuro: A and O x 4, CN exam nonfocal, Motor- no deficits, Sensory- no deficits, gait-nl, speech- fluent, no ataxia. Assessment/Plan:   1. 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 Hypercalcemia. 3.  Renal failure. 4.  Chronic anemia. 5.  Leukopenia. Records reviewed.   Patient has had an abdomen CT performed 1/10/2023 confirming large ventral hernia with multiple bowel loops there is no evidence of obstruction. Gallbladder is distended with gallstones and mildly prominent CBD. At this time patient is having no symptoms pertaining to her cervical cancer. No records for when her last pelvic exam was. We discussed NCCN guidelines in regards to surveillance. It is recommended she establish care with GYN for periodic pelvic examinations    Return to clinic 6 months with labs and pelvic CT (noncontrast CT due to allergy/renal failure)      Patient Instructions   Labs today. Possible xgeva  Pt had  multiple scans - CT, MRI done [facility]. Please get records  CT pelvis 6 months  RTC  6 months     Maureen Colin MD  2/22/2023      **This report has been created using voice recognition software. It may contain minor errors which are inherent in voice recognition technology. **

## 2023-02-22 NOTE — PROGRESS NOTES
Pt tolerated xgeva injection without any complications. Discharge instructions given to patient. Patient verbalizes understanding. Pt ambulated off unit accompanied by  with belongings.

## 2023-02-22 NOTE — PLAN OF CARE
Problem: Safety - Adult  Goal: Free from fall injury  Outcome: Adequate for Discharge  Flowsheets (Taken 2/22/2023 1243)  Free From Fall Injury: Instruct family/caregiver on patient safety  Note: Patient free of falls this visit. Fall risks assessed. Precautions discussed. Problem: Discharge Planning  Goal: Discharge to home or other facility with appropriate resources  Outcome: Adequate for Discharge  Flowsheets (Taken 2/22/2023 1243)  Discharge to home or other facility with appropriate resources:   Identify barriers to discharge with patient and caregiver   Arrange for needed discharge resources and transportation as appropriate  Note: Patient verbalizes understanding of discharge instructions, follow up appointment, and when to call physician if needed      Problem: Chronic Conditions and Co-morbidities  Goal: Patient's chronic conditions and co-morbidity symptoms are monitored and maintained or improved  Outcome: Adequate for Discharge  Flowsheets (Taken 2/22/2023 1243)  Care Plan - Patient's Chronic Conditions and Co-Morbidity Symptoms are Monitored and Maintained or Improved:   Monitor and assess patient's chronic conditions and comorbid symptoms for stability, deterioration, or improvement   Collaborate with multidisciplinary team to address chronic and comorbid conditions and prevent exacerbation or deterioration  Note: Patient verbalizes understanding to verbal information given on xgeva, including action and possible side effects. Aware to call MD if develop complications. Care plan reviewed with patient. Patient verbalizes understanding of the plan of care and contributes to goal setting.

## 2023-02-22 NOTE — DISCHARGE INSTRUCTIONS
Call if any uncontrolled nausea or vomiting   Call if any fever,chills, problems or concerns  Call if any questions  Drink at least 6 - 8 ounces glasses of fluids a day    Patient to watch for any:    Dizziness     Lightheadedness        Acute nausea/vomiting   Headache     Chest pain/pressure    Rash/itching     Shortness of breath      Patient instructed if experience any of the above symptoms following today's xgeva injection, she is to notify MD immediately or go to the emergency department.

## 2023-03-06 RX ORDER — MINOXIDIL 2.5 MG/1
TABLET ORAL
Qty: 120 TABLET | OUTPATIENT
Start: 2023-03-06

## 2023-04-07 RX ORDER — NIFEDIPINE 60 MG/1
TABLET, FILM COATED, EXTENDED RELEASE ORAL
Qty: 270 TABLET | OUTPATIENT
Start: 2023-04-07

## 2023-08-01 NOTE — PROGRESS NOTES
[address]  [address] [address]  Dept: [phone]  Loc: [phone]   Hematology/Oncology Progress Note (Clinic)        Narayan Mae  1955  No ref. provider found   MEGAN Lewis     Diagnosis/CC:   Chief Complaint   Patient presents with    Follow-up     Malignant neoplasm of cervix, unspecified site [facility])     HPI:  Narayan Mae is a very pleasant 75 y.o. female followed for cervical cancer. Patient initially presented in June 2019 with lower abdominal pain. July 2019 patient developed vaginal bleeding, and presented to [facility]. Ultrasound confirmed fullness in the lower uterine segment. MRI scan of the pelvis was completed which displayed a 5 cm cervical neoplasm with invasion of the parametrial and upper vagina region. There was noted obstruction of the endocervical canal with dilatation of the endometrial cavity. In context to this condition, the patient was then referred to [facility] gynecological oncology for further evaluation. October 26, 2019 the patient underwent biopsy of the exocervix, endocervix, and endometrium. Surgical pathology confirmed squamous cell carcinoma. A follow-up PET scan then was completed which found intense abnormal metabolic activity associated with the primary malignancy but no evidence of metastatic spread of malignancy. 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 Patient has history of hypercalcemia. The patient also has renal failure (on dialysis ) and is followed by nephrology. She is on Aranesp support with IV iron as needed. She is getting Xgeva periodically for hypercalcemia    Subjective/Interim Summary:   8/2/23-  Patient complains of feeling lethargic, having headaches and shortness of breath for the last 3-4 weeks since her calcium levels started going back up.  She

## 2023-08-02 ENCOUNTER — OFFICE VISIT (OUTPATIENT)
Dept: ONCOLOGY | Age: 68
End: 2023-08-02
Payer: MEDICARE

## 2023-08-02 ENCOUNTER — HOSPITAL ENCOUNTER (OUTPATIENT)
Dept: INFUSION THERAPY | Age: 68
Discharge: HOME | End: 2023-08-02
Payer: MEDICARE

## 2023-08-02 ENCOUNTER — TELEPHONE (OUTPATIENT)
Dept: ONCOLOGY | Age: 68
End: 2023-08-02

## 2023-08-02 VITALS
TEMPERATURE: 97.6 F | RESPIRATION RATE: 20 BRPM | HEIGHT: 65 IN | WEIGHT: 137.8 LBS | DIASTOLIC BLOOD PRESSURE: 72 MMHG | OXYGEN SATURATION: 96 % | SYSTOLIC BLOOD PRESSURE: 164 MMHG | BODY MASS INDEX: 22.96 KG/M2 | HEART RATE: 65 BPM

## 2023-08-02 VITALS
HEART RATE: 65 BPM | SYSTOLIC BLOOD PRESSURE: 164 MMHG | BODY MASS INDEX: 22.96 KG/M2 | OXYGEN SATURATION: 96 % | RESPIRATION RATE: 20 BRPM | HEIGHT: 65 IN | WEIGHT: 137.8 LBS | DIASTOLIC BLOOD PRESSURE: 72 MMHG | TEMPERATURE: 97.6 F

## 2023-08-02 DIAGNOSIS — E83.52 HYPERCALCEMIA: Primary | ICD-10-CM

## 2023-08-02 DIAGNOSIS — C53.9 MALIGNANT NEOPLASM OF CERVIX, UNSPECIFIED SITE (HCC): ICD-10-CM

## 2023-08-02 PROCEDURE — 3078F DIAST BP <80 MM HG: CPT | Performed by: INTERNAL MEDICINE

## 2023-08-02 PROCEDURE — 99211 OFF/OP EST MAY X REQ PHY/QHP: CPT

## 2023-08-02 PROCEDURE — 1123F ACP DISCUSS/DSCN MKR DOCD: CPT | Performed by: INTERNAL MEDICINE

## 2023-08-02 PROCEDURE — 99214 OFFICE O/P EST MOD 30 MIN: CPT | Performed by: INTERNAL MEDICINE

## 2023-08-02 PROCEDURE — 3077F SYST BP >= 140 MM HG: CPT | Performed by: INTERNAL MEDICINE

## 2023-08-09 ENCOUNTER — APPOINTMENT (OUTPATIENT)
Dept: MRI IMAGING | Age: 68
End: 2023-08-09
Payer: MEDICARE

## 2023-08-09 ENCOUNTER — APPOINTMENT (OUTPATIENT)
Dept: GENERAL RADIOLOGY | Age: 68
End: 2023-08-09
Payer: MEDICARE

## 2023-08-09 ENCOUNTER — HOSPITAL ENCOUNTER (INPATIENT)
Age: 68
LOS: 3 days | Discharge: HOME | End: 2023-08-12
Attending: EMERGENCY MEDICINE | Admitting: INTERNAL MEDICINE
Payer: MEDICARE

## 2023-08-09 DIAGNOSIS — E83.52 HYPERCALCEMIA: Primary | ICD-10-CM

## 2023-08-09 DIAGNOSIS — D35.1 PARATHYROID ADENOMA: ICD-10-CM

## 2023-08-09 LAB
25(OH)D3 SERPL-MCNC: 17 NG/ML (ref 30–100)
ALBUMIN SERPL BCG-MCNC: 4.1 G/DL (ref 3.5–5.1)
ALP SERPL-CCNC: 113 U/L (ref 38–126)
ALT SERPL W/O P-5'-P-CCNC: < 5 U/L (ref 11–66)
ANION GAP SERPL CALC-SCNC: 17 MEQ/L (ref 8–16)
AST SERPL-CCNC: 10 U/L (ref 5–40)
BASOPHILS ABSOLUTE: 0 THOU/MM3 (ref 0–0.1)
BASOPHILS NFR BLD AUTO: 0.5 %
BILIRUB SERPL-MCNC: 0.3 MG/DL (ref 0.3–1.2)
BUN SERPL-MCNC: 37 MG/DL (ref 7–22)
CALCIUM SERPL-MCNC: 13.3 MG/DL (ref 8.5–10.5)
CHLORIDE SERPL-SCNC: 93 MEQ/L (ref 98–111)
CO2 SERPL-SCNC: 28 MEQ/L (ref 23–33)
CREAT SERPL-MCNC: 6.5 MG/DL (ref 0.4–1.2)
DEPRECATED RDW RBC AUTO: 46 FL (ref 35–45)
EOSINOPHIL NFR BLD AUTO: 2.5 %
EOSINOPHILS ABSOLUTE: 0.2 THOU/MM3 (ref 0–0.4)
ERYTHROCYTE [DISTWIDTH] IN BLOOD BY AUTOMATED COUNT: 14 % (ref 11.5–14.5)
GFR SERPL CREATININE-BSD FRML MDRD: 6 ML/MIN/1.73M2
GLUCOSE SERPL-MCNC: 91 MG/DL (ref 70–108)
HCT VFR BLD AUTO: 34.1 % (ref 37–47)
HGB BLD-MCNC: 11 GM/DL (ref 12–16)
IMM GRANULOCYTES # BLD AUTO: 0.08 THOU/MM3 (ref 0–0.07)
IMM GRANULOCYTES NFR BLD AUTO: 1.3 %
LYMPHOCYTES ABSOLUTE: 1.2 THOU/MM3 (ref 1–4.8)
LYMPHOCYTES NFR BLD AUTO: 18.3 %
MCH RBC QN AUTO: 29.4 PG (ref 26–33)
MCHC RBC AUTO-ENTMCNC: 32.3 GM/DL (ref 32.2–35.5)
MCV RBC AUTO: 91.2 FL (ref 81–99)
MONOCYTES ABSOLUTE: 0.9 THOU/MM3 (ref 0.4–1.3)
MONOCYTES NFR BLD AUTO: 13.5 %
NEUTROPHILS NFR BLD AUTO: 63.9 %
NRBC BLD AUTO-RTO: 0 /100 WBC
OSMOLALITY SERPL CALC.SUM OF ELEC: 283.9 MOSMOL/KG (ref 275–300)
PLATELET # BLD AUTO: 283 THOU/MM3 (ref 130–400)
PMV BLD AUTO: 10.1 FL (ref 9.4–12.4)
POTASSIUM SERPL-SCNC: 4.1 MEQ/L (ref 3.5–5.2)
PROT SERPL-MCNC: 6.9 G/DL (ref 6.1–8)
RBC # BLD AUTO: 3.74 MILL/MM3 (ref 4.2–5.4)
SEGMENTED NEUTROPHILS ABSOLUTE COUNT: 4.1 THOU/MM3 (ref 1.8–7.7)
SODIUM SERPL-SCNC: 138 MEQ/L (ref 135–145)
TSH SERPL DL<=0.005 MIU/L-ACNC: 0.08 UIU/ML (ref 0.4–4.2)
WBC # BLD AUTO: 6.4 THOU/MM3 (ref 4.8–10.8)

## 2023-08-09 PROCEDURE — 6370000000 HC RX 637 (ALT 250 FOR IP): Performed by: INTERNAL MEDICINE

## 2023-08-09 PROCEDURE — 90935 HEMODIALYSIS ONE EVALUATION: CPT

## 2023-08-09 PROCEDURE — 80053 COMPREHEN METABOLIC PANEL: CPT

## 2023-08-09 PROCEDURE — 99285 EMERGENCY DEPT VISIT HI MDM: CPT

## 2023-08-09 PROCEDURE — 72195 MRI PELVIS W/O DYE: CPT

## 2023-08-09 PROCEDURE — 83970 ASSAY OF PARATHORMONE: CPT

## 2023-08-09 PROCEDURE — 6360000002 HC RX W HCPCS: Performed by: INTERNAL MEDICINE

## 2023-08-09 PROCEDURE — 84443 ASSAY THYROID STIM HORMONE: CPT

## 2023-08-09 PROCEDURE — 83519 RIA NONANTIBODY: CPT

## 2023-08-09 PROCEDURE — 2580000003 HC RX 258: Performed by: INTERNAL MEDICINE

## 2023-08-09 PROCEDURE — 1200000003 HC TELEMETRY R&B

## 2023-08-09 PROCEDURE — 93005 ELECTROCARDIOGRAM TRACING: CPT | Performed by: EMERGENCY MEDICINE

## 2023-08-09 PROCEDURE — 82306 VITAMIN D 25 HYDROXY: CPT

## 2023-08-09 PROCEDURE — 5A1D70Z PERFORMANCE OF URINARY FILTRATION, INTERMITTENT, LESS THAN 6 HOURS PER DAY: ICD-10-PCS | Performed by: INTERNAL MEDICINE

## 2023-08-09 PROCEDURE — 93005 ELECTROCARDIOGRAM TRACING: CPT | Performed by: INTERNAL MEDICINE

## 2023-08-09 PROCEDURE — 93010 ELECTROCARDIOGRAM REPORT: CPT | Performed by: INTERNAL MEDICINE

## 2023-08-09 PROCEDURE — 36415 COLL VENOUS BLD VENIPUNCTURE: CPT

## 2023-08-09 PROCEDURE — 71045 X-RAY EXAM CHEST 1 VIEW: CPT

## 2023-08-09 PROCEDURE — 85025 COMPLETE CBC W/AUTO DIFF WBC: CPT

## 2023-08-09 PROCEDURE — 1200000000 HC SEMI PRIVATE

## 2023-08-09 PROCEDURE — 90935 HEMODIALYSIS ONE EVALUATION: CPT | Performed by: INTERNAL MEDICINE

## 2023-08-09 RX ORDER — ONDANSETRON 2 MG/ML
4 INJECTION INTRAMUSCULAR; INTRAVENOUS EVERY 6 HOURS PRN
Status: DISCONTINUED | OUTPATIENT
Start: 2023-08-09 | End: 2023-08-12 | Stop reason: HOSPADM

## 2023-08-09 RX ORDER — MINOXIDIL 2.5 MG/1
2.5 TABLET ORAL 2 TIMES DAILY
Status: DISCONTINUED | OUTPATIENT
Start: 2023-08-09 | End: 2023-08-12 | Stop reason: HOSPADM

## 2023-08-09 RX ORDER — NIFEDIPINE 60 MG/1
60 TABLET, EXTENDED RELEASE ORAL 2 TIMES DAILY
Status: DISCONTINUED | OUTPATIENT
Start: 2023-08-09 | End: 2023-08-12 | Stop reason: HOSPADM

## 2023-08-09 RX ORDER — ONDANSETRON 4 MG/1
4 TABLET, ORALLY DISINTEGRATING ORAL EVERY 8 HOURS PRN
Status: DISCONTINUED | OUTPATIENT
Start: 2023-08-09 | End: 2023-08-12 | Stop reason: HOSPADM

## 2023-08-09 RX ORDER — SODIUM CHLORIDE 9 MG/ML
INJECTION, SOLUTION INTRAVENOUS PRN
Status: DISCONTINUED | OUTPATIENT
Start: 2023-08-09 | End: 2023-08-12 | Stop reason: HOSPADM

## 2023-08-09 RX ORDER — CALCITONIN SALMON 200 [USP'U]/ML
100 INJECTION, SOLUTION INTRAMUSCULAR; SUBCUTANEOUS DAILY
Status: DISCONTINUED | OUTPATIENT
Start: 2023-08-09 | End: 2023-08-09

## 2023-08-09 RX ORDER — ASPIRIN 81 MG/1
81 TABLET ORAL DAILY
Status: DISCONTINUED | OUTPATIENT
Start: 2023-08-10 | End: 2023-08-12 | Stop reason: HOSPADM

## 2023-08-09 RX ORDER — CALCITONIN SALMON 200 [USP'U]/ML
4 INJECTION, SOLUTION INTRAMUSCULAR; SUBCUTANEOUS EVERY 12 HOURS
Status: COMPLETED | OUTPATIENT
Start: 2023-08-09 | End: 2023-08-10

## 2023-08-09 RX ORDER — LEVOTHYROXINE SODIUM 0.1 MG/1
100 TABLET ORAL DAILY
COMMUNITY

## 2023-08-09 RX ORDER — TEMAZEPAM 15 MG/1
15 CAPSULE ORAL NIGHTLY PRN
Status: DISCONTINUED | OUTPATIENT
Start: 2023-08-09 | End: 2023-08-12 | Stop reason: HOSPADM

## 2023-08-09 RX ORDER — LEVOTHYROXINE SODIUM 0.1 MG/1
100 TABLET ORAL DAILY
Status: DISCONTINUED | OUTPATIENT
Start: 2023-08-10 | End: 2023-08-12 | Stop reason: HOSPADM

## 2023-08-09 RX ORDER — POLYETHYLENE GLYCOL 3350 17 G/17G
17 POWDER, FOR SOLUTION ORAL DAILY PRN
Status: DISCONTINUED | OUTPATIENT
Start: 2023-08-09 | End: 2023-08-12 | Stop reason: HOSPADM

## 2023-08-09 RX ORDER — SODIUM CHLORIDE 0.9 % (FLUSH) 0.9 %
5-40 SYRINGE (ML) INJECTION EVERY 12 HOURS SCHEDULED
Status: DISCONTINUED | OUTPATIENT
Start: 2023-08-09 | End: 2023-08-12 | Stop reason: HOSPADM

## 2023-08-09 RX ORDER — SODIUM CHLORIDE 0.9 % (FLUSH) 0.9 %
5-40 SYRINGE (ML) INJECTION PRN
Status: DISCONTINUED | OUTPATIENT
Start: 2023-08-09 | End: 2023-08-12 | Stop reason: HOSPADM

## 2023-08-09 RX ORDER — CLONIDINE HYDROCHLORIDE 0.2 MG/1
0.2 TABLET ORAL 3 TIMES DAILY
Status: DISCONTINUED | OUTPATIENT
Start: 2023-08-09 | End: 2023-08-12 | Stop reason: HOSPADM

## 2023-08-09 RX ORDER — ACETAMINOPHEN 325 MG/1
650 TABLET ORAL EVERY 6 HOURS PRN
Status: DISCONTINUED | OUTPATIENT
Start: 2023-08-09 | End: 2023-08-12 | Stop reason: HOSPADM

## 2023-08-09 RX ORDER — ACETAMINOPHEN 650 MG/1
650 SUPPOSITORY RECTAL EVERY 6 HOURS PRN
Status: DISCONTINUED | OUTPATIENT
Start: 2023-08-09 | End: 2023-08-12 | Stop reason: HOSPADM

## 2023-08-09 RX ORDER — HEPARIN SODIUM 5000 [USP'U]/ML
5000 INJECTION, SOLUTION INTRAVENOUS; SUBCUTANEOUS EVERY 8 HOURS SCHEDULED
Status: DISCONTINUED | OUTPATIENT
Start: 2023-08-09 | End: 2023-08-12 | Stop reason: HOSPADM

## 2023-08-09 RX ORDER — FOLIC ACID/VIT B COMPLEX AND C 5 MG
1 TABLET ORAL DAILY
Status: DISCONTINUED | OUTPATIENT
Start: 2023-08-10 | End: 2023-08-12 | Stop reason: HOSPADM

## 2023-08-09 RX ORDER — DIAZEPAM 5 MG/1
5 TABLET ORAL EVERY 6 HOURS PRN
Status: CANCELLED | OUTPATIENT
Start: 2023-08-09

## 2023-08-09 RX ADMIN — HEPARIN SODIUM 5000 UNITS: 5000 INJECTION INTRAVENOUS; SUBCUTANEOUS at 21:37

## 2023-08-09 RX ADMIN — SODIUM CHLORIDE, PRESERVATIVE FREE 10 ML: 5 INJECTION INTRAVENOUS at 22:01

## 2023-08-09 RX ADMIN — CLONIDINE HYDROCHLORIDE 0.2 MG: 0.2 TABLET ORAL at 22:00

## 2023-08-09 RX ADMIN — CALCITONIN SALMON 240 UNITS: 200 INJECTION, SOLUTION INTRAMUSCULAR; SUBCUTANEOUS at 23:53

## 2023-08-09 RX ADMIN — ACETAMINOPHEN 650 MG: 325 TABLET ORAL at 21:37

## 2023-08-09 RX ADMIN — TEMAZEPAM 15 MG: 15 CAPSULE ORAL at 22:01

## 2023-08-09 RX ADMIN — MINOXIDIL 2.5 MG: 2.5 TABLET ORAL at 22:00

## 2023-08-09 RX ADMIN — NIFEDIPINE 60 MG: 60 TABLET, EXTENDED RELEASE ORAL at 22:01

## 2023-08-09 ASSESSMENT — PAIN SCALES - GENERAL: PAINLEVEL_OUTOF10: 10

## 2023-08-09 ASSESSMENT — PAIN DESCRIPTION - PAIN TYPE: TYPE: CHRONIC PAIN

## 2023-08-09 ASSESSMENT — PAIN - FUNCTIONAL ASSESSMENT: PAIN_FUNCTIONAL_ASSESSMENT: 0-10

## 2023-08-09 NOTE — ED NOTES
Pt resting in bed,  visiting at bedside. Questions and concerns addressed. Pt notes pain in her muscles and bones, pt states \"it gets this way when my calcium is off\".      Terry Manzanares RN  08/09/23 0566

## 2023-08-09 NOTE — CONSULTS
[facility]    [address] [facility], [address] [place] [address],[address]  [address] [address] [address]  8/9/2023 6:14 PM    Pt Name:    Fadi Parra  MRN:     [id number]   [id number]  YOB: 1955  Admit Date:    8/9/2023 11:14 AM  Primary Care Physician:  MEGAN Bhagat    CSN Number:   [id number]    Reason for Consult:  ESRD, hypercalcemia  Requesting provider:  Dr. Selvin George (ER) and Dr. Don Baeza    History:   The patient is a 75 y.o. with a history of cervical cancer, ESRD on dialysis. Patient has complicated history of hypercalcemia. She also has a history of cervical cancer (squamous cell carcinoma) which was diagnosed in 2019 on PET scan. She underwent radiation and chemotherapy treatment in 2019. However she has had poor follow-up with OB/GYN. She has refused pelvic exams. Patient was closely following with Dr. Madhuri Ruiz. Reportedly,  per oncology notes, there has been some reluctancy on part of the patient to proceed with CAT scans as well as pelvic exams in the past.  In the past I have also recommended her to see endocrinology but she had declined. She had also declined to proceed with parathyroid scans in the past.  Patient has had wide fluctuations and swings in her calcium and parathyroid levels. She has been receiving Xgeva every 6 months by Dr. Madhuri Ruiz. She was recently seen by another oncologist who felt that she needed to be seen by endocrinology since he did not feel that her hypercalcemia was related to malignancy. However she has not had contrast scans or pelvic exams lately. Patient was sent to the emergency room after she was noted to have worsening calcium levels in the outpatient setting despite being on Sensipar as well as very low calcium bath on dialysis. Patient reports that she has been feeling weak, fatigued. Reports worsening symptoms. Reports she feels extremely nauseated. Reports some bone pains. Reports malaise.   No alleviating or aggravating

## 2023-08-09 NOTE — ED NOTES
This RN talked with Tasha Degroot on 6K called and informed that the patient was at dialysis and would be done about [address], RN  08/09/23 1488

## 2023-08-09 NOTE — ED PROVIDER NOTES
[facility] Renal Telemetry 6K      CHIEF COMPLAINT       Chief Complaint   Patient presents with    Fatigue    Abnormal Lab       Nurses Notes reviewed and I agree except as noted in the HPI. HISTORY OF PRESENT ILLNESS    Rufino Gordon is a 75 y.o. female who presents with complaint of fatigue and elevated calcium. Patient has history of hypercalcemia of malignancy, she is also a dialysis patient, was seen by her nephrologist who sent to the ED for evaluation. Patient has no other acute complaints. Onset: Acute on chronic  Duration: Chronic  Timing: Persistent  Location of Pain: No pain  Intesity/severity:   Modifying Factors:   Relieved by;  Previous Episodes; Tx Before arrival: Dialysis patient  REVIEW OF SYSTEMS       PAST MEDICAL HISTORY    has a past medical history of Anxiety, Diarrhea, Dryness of periwound skin, Gout, joint, Hernia of abdominal cavity, Hypertension, Kidney disease, and Mouth dryness. SURGICAL HISTORY      has a past surgical history that includes Ureter stent placement (11/2019) and AV fistula creation (Left). CURRENT MEDICATIONS       Current Discharge Medication List        CONTINUE these medications which have NOT CHANGED    Details   levothyroxine (SYNTHROID) 100 MCG tablet Take 1 tablet by mouth Daily      K Phos [place]-Sod Phos Di & [place]hill (PHOSPHOROUS PO) Take by mouth 3 times daily (before meals)      diazePAM (VALIUM) 5 MG tablet Take 1 tablet by mouth every 6 hours as needed for Anxiety (Takes on dialysis days).       B Complex-C-Folic Acid (RENAL-KIESHA) 0.8 MG TABS Take 1 tablet by mouth daily      cloNIDine (CATAPRES) 0.2 MG tablet Take 1 tablet by mouth 3 times daily      minoxidil (LONITEN) 2.5 MG tablet Take 1 tablet by mouth 2 times daily      aspirin 81 MG EC tablet Take 1 tablet by mouth daily      NIFEdipine (ADALAT CC) 60 MG extended release tablet Take 1 tablet by mouth 2 times daily  Refills: 3      temazepam (RESTORIL) 30 MG capsule TAKE 1 CAPSULE BY MOUTH ONCE

## 2023-08-09 NOTE — H&P
Provider, MD   B Complex-C-Folic Acid (RENAL-KIESHA) 0.8 MG TABS Take 1 tablet by mouth daily    Historical Provider, MD   cloNIDine (CATAPRES) 0.2 MG tablet Take 1 tablet by mouth 3 times daily    Historical Provider, MD   minoxidil (LONITEN) 2.5 MG tablet Take 1 tablet by mouth 2 times daily    Historical Provider, MD   aspirin 81 MG EC tablet Take 1 tablet by mouth daily    Historical Provider, MD   NIFEdipine (ADALAT CC) 60 MG extended release tablet Take 1 tablet by mouth 2 times daily 9/3/19   Historical Provider, MD   levothyroxine (SYNTHROID) 125 MCG tablet Take 114 mcg by mouth Daily 8/16/19   Historical Provider, MD   temazepam (RESTORIL) 30 MG capsule TAKE 1 CAPSULE BY MOUTH ONCE DAILY AT BEDTIME AS NEEDED FOR SLEEP 8/20/19   Historical Provider, MD     Allergies:  Clopidogrel and Dye [iodides]    Social History:   TOBACCO:   reports that she has never smoked. She has never used smokeless tobacco.  ETOH:   reports that she does not currently use alcohol.     Family History:       Problem Relation Age of Onset    Cancer Father     No Known Problems Sister     No Known Problems Brother      REVIEW OF SYSTEMS:  CONSTITUTIONAL:  positive for  fatigue and malaise  negative for  fevers, chills, and anorexia  EYES:  negative for  double vision, irritation, and redness  HEENT:  negative for  sore mouth, sore throat, and hoarseness  RESPIRATORY:  negative for  dry cough, dyspnea, wheezing, and hemoptysis  CARDIOVASCULAR:  negative for  chest pain, dyspnea, palpitations  GASTROINTESTINAL:  positive for nausea and abdominal pain  negative for vomiting, abdominal distention, odynophagia, hematemesis, and hemtochezia  GENITOURINARY:  anuria on HD  INTEGUMENT/BREAST:  negative for rash and skin lesion(s)  HEMATOLOGIC/LYMPHATIC:  negative for easy bruising and bleeding  ALLERGIC/IMMUNOLOGIC:  negative  ENDOCRINE negative for heat intolerance and cold intolerance  MUSCULOSKELETAL:  positive for  myalgias, arthralgias, degrees   Narrative:     Normal sinus rhythm   Possible Left atrial enlargement   Left axis deviation   Minimal voltage criteria for LVH, may be normal variant ( Lincoln product )   Anterior infarct (cited on or before 16-JUL-2021)        Assessment and Plan   Severe Hypercalcemia  ESRD  HTN  Hypothyroidism  H/o cervical cancer s/p XRT    Admit-Inpt  Check PTH, PTH-related peptide  25 Hydroxy VitD  Resume synthroid, bp meds  Endo consult  Renal consult.     Patient Active Problem List   Diagnosis Code    Malignant neoplasm of cervix ([address]) C53.9    Encounter for chemotherapy management Z51.11    Hypertension I10    Chronic anemia D64.9    Chronic renal insufficiency, stage 4 (severe) (HCC) N18.4    Hypercalcemia E83.52       Shay Ponce MD, MD  Admitting Internist

## 2023-08-09 NOTE — ED TRIAGE NOTES
Pt to ED due to an abnormal lab and fatigue. Pt states that she had blood work on Monday and was called today with results. Pt states she was told her calcium was above a 12 and to come to the ED for \"an infusion. \" Pt states that she has been feeling more and more fatigued for the past three weeks.

## 2023-08-09 NOTE — ED NOTES
Pt resting in bed, awake, and visiting with . Pt continues with easy and unlabored respirations.      Britton Wen RN  08/09/23 [id number]

## 2023-08-10 ENCOUNTER — APPOINTMENT (OUTPATIENT)
Dept: NUCLEAR MEDICINE | Age: 68
End: 2023-08-10
Payer: MEDICARE

## 2023-08-10 LAB
25(OH)D3 SERPL-MCNC: 16 NG/ML (ref 30–100)
ANION GAP SERPL CALC-SCNC: 15 MEQ/L (ref 8–16)
BASOPHILS ABSOLUTE: 0 THOU/MM3 (ref 0–0.1)
BASOPHILS NFR BLD AUTO: 0.3 %
BUN SERPL-MCNC: 13 MG/DL (ref 7–22)
CALCIUM SERPL-MCNC: 10.7 MG/DL (ref 8.5–10.5)
CHLORIDE SERPL-SCNC: 92 MEQ/L (ref 98–111)
CO2 SERPL-SCNC: 30 MEQ/L (ref 23–33)
CREAT SERPL-MCNC: 3.4 MG/DL (ref 0.4–1.2)
DEPRECATED RDW RBC AUTO: 44.3 FL (ref 35–45)
EOSINOPHIL NFR BLD AUTO: 2.5 %
EOSINOPHILS ABSOLUTE: 0.2 THOU/MM3 (ref 0–0.4)
ERYTHROCYTE [DISTWIDTH] IN BLOOD BY AUTOMATED COUNT: 14.1 % (ref 11.5–14.5)
GFR SERPL CREATININE-BSD FRML MDRD: 14 ML/MIN/1.73M2
GLUCOSE SERPL-MCNC: 95 MG/DL (ref 70–108)
HCT VFR BLD AUTO: 33.4 % (ref 37–47)
HGB BLD-MCNC: 11.2 GM/DL (ref 12–16)
IMM GRANULOCYTES # BLD AUTO: 0.05 THOU/MM3 (ref 0–0.07)
IMM GRANULOCYTES NFR BLD AUTO: 0.8 %
LYMPHOCYTES ABSOLUTE: 1 THOU/MM3 (ref 1–4.8)
LYMPHOCYTES NFR BLD AUTO: 15.8 %
MAGNESIUM SERPL-MCNC: 2.1 MG/DL (ref 1.6–2.4)
MCH RBC QN AUTO: 29.4 PG (ref 26–33)
MCHC RBC AUTO-ENTMCNC: 33.5 GM/DL (ref 32.2–35.5)
MCV RBC AUTO: 87.7 FL (ref 81–99)
MONOCYTES ABSOLUTE: 1 THOU/MM3 (ref 0.4–1.3)
MONOCYTES NFR BLD AUTO: 15.5 %
NEUTROPHILS NFR BLD AUTO: 65.1 %
NRBC BLD AUTO-RTO: 0 /100 WBC
OSMOLALITY SERPL CALC.SUM OF ELEC: 273.7 MOSMOL/KG (ref 275–300)
PHOSPHATE SERPL-MCNC: 3.8 MG/DL (ref 2.4–4.7)
PLATELET # BLD AUTO: 301 THOU/MM3 (ref 130–400)
PMV BLD AUTO: 9.8 FL (ref 9.4–12.4)
POTASSIUM SERPL-SCNC: 3.6 MEQ/L (ref 3.5–5.2)
PTH-INTACT SERPL-MCNC: 377.3 PG/ML (ref 15–65)
PTH-INTACT SERPL-MCNC: 493.5 PG/ML (ref 15–65)
RBC # BLD AUTO: 3.81 MILL/MM3 (ref 4.2–5.4)
SEGMENTED NEUTROPHILS ABSOLUTE COUNT: 4.1 THOU/MM3 (ref 1.8–7.7)
SODIUM SERPL-SCNC: 137 MEQ/L (ref 135–145)
WBC # BLD AUTO: 6.3 THOU/MM3 (ref 4.8–10.8)

## 2023-08-10 PROCEDURE — 6360000002 HC RX W HCPCS: Performed by: INTERNAL MEDICINE

## 2023-08-10 PROCEDURE — 82784 ASSAY IGA/IGD/IGG/IGM EACH: CPT

## 2023-08-10 PROCEDURE — 83735 ASSAY OF MAGNESIUM: CPT

## 2023-08-10 PROCEDURE — 83970 ASSAY OF PARATHORMONE: CPT

## 2023-08-10 PROCEDURE — 85025 COMPLETE CBC W/AUTO DIFF WBC: CPT

## 2023-08-10 PROCEDURE — 2580000003 HC RX 258: Performed by: INTERNAL MEDICINE

## 2023-08-10 PROCEDURE — 86334 IMMUNOFIX E-PHORESIS SERUM: CPT

## 2023-08-10 PROCEDURE — 36415 COLL VENOUS BLD VENIPUNCTURE: CPT

## 2023-08-10 PROCEDURE — 99232 SBSQ HOSP IP/OBS MODERATE 35: CPT | Performed by: INTERNAL MEDICINE

## 2023-08-10 PROCEDURE — 3430000000 HC RX DIAGNOSTIC RADIOPHARMACEUTICAL: Performed by: INTERNAL MEDICINE

## 2023-08-10 PROCEDURE — 82652 VIT D 1 25-DIHYDROXY: CPT

## 2023-08-10 PROCEDURE — 84155 ASSAY OF PROTEIN SERUM: CPT

## 2023-08-10 PROCEDURE — 93010 ELECTROCARDIOGRAM REPORT: CPT | Performed by: INTERNAL MEDICINE

## 2023-08-10 PROCEDURE — 1200000000 HC SEMI PRIVATE

## 2023-08-10 PROCEDURE — 82306 VITAMIN D 25 HYDROXY: CPT

## 2023-08-10 PROCEDURE — A9500 TC99M SESTAMIBI: HCPCS | Performed by: INTERNAL MEDICINE

## 2023-08-10 PROCEDURE — 6370000000 HC RX 637 (ALT 250 FOR IP): Performed by: INTERNAL MEDICINE

## 2023-08-10 PROCEDURE — 84100 ASSAY OF PHOSPHORUS: CPT

## 2023-08-10 PROCEDURE — 80048 BASIC METABOLIC PNL TOTAL CA: CPT

## 2023-08-10 PROCEDURE — 84165 PROTEIN E-PHORESIS SERUM: CPT

## 2023-08-10 PROCEDURE — 78070 PARATHYROID PLANAR IMAGING: CPT

## 2023-08-10 RX ORDER — DIPHENHYDRAMINE HCL 25 MG
25 TABLET ORAL EVERY 6 HOURS PRN
Status: DISCONTINUED | OUTPATIENT
Start: 2023-08-10 | End: 2023-08-12 | Stop reason: HOSPADM

## 2023-08-10 RX ORDER — TETRAKIS(2-METHOXYISOBUTYLISOCYANIDE)COPPER(I) TETRAFLUOROBORATE 1 MG/ML
29.4 INJECTION, POWDER, LYOPHILIZED, FOR SOLUTION INTRAVENOUS
Status: COMPLETED | OUTPATIENT
Start: 2023-08-10 | End: 2023-08-10

## 2023-08-10 RX ORDER — PROCHLORPERAZINE MALEATE 10 MG
10 TABLET ORAL EVERY 6 HOURS PRN
Status: DISCONTINUED | OUTPATIENT
Start: 2023-08-10 | End: 2023-08-12 | Stop reason: HOSPADM

## 2023-08-10 RX ADMIN — ACETAMINOPHEN 650 MG: 325 TABLET ORAL at 06:31

## 2023-08-10 RX ADMIN — NIFEDIPINE 60 MG: 60 TABLET, EXTENDED RELEASE ORAL at 10:18

## 2023-08-10 RX ADMIN — ASPIRIN 81 MG: 81 TABLET, COATED ORAL at 10:18

## 2023-08-10 RX ADMIN — MINOXIDIL 2.5 MG: 2.5 TABLET ORAL at 10:18

## 2023-08-10 RX ADMIN — SODIUM CHLORIDE, PRESERVATIVE FREE 10 ML: 5 INJECTION INTRAVENOUS at 19:49

## 2023-08-10 RX ADMIN — Medication 29.4 MILLICURIE: at 09:20

## 2023-08-10 RX ADMIN — HEPARIN SODIUM 5000 UNITS: 5000 INJECTION INTRAVENOUS; SUBCUTANEOUS at 22:29

## 2023-08-10 RX ADMIN — ZOLEDRONIC ACID 2 MG: 4 INJECTION INTRAVENOUS at 15:22

## 2023-08-10 RX ADMIN — SODIUM CHLORIDE, PRESERVATIVE FREE 10 ML: 5 INJECTION INTRAVENOUS at 10:31

## 2023-08-10 RX ADMIN — Medication 1 TABLET: at 15:18

## 2023-08-10 RX ADMIN — TEMAZEPAM 15 MG: 15 CAPSULE ORAL at 23:24

## 2023-08-10 RX ADMIN — CLONIDINE HYDROCHLORIDE 0.2 MG: 0.2 TABLET ORAL at 19:49

## 2023-08-10 RX ADMIN — CALCITONIN SALMON 240 UNITS: 200 INJECTION, SOLUTION INTRAMUSCULAR; SUBCUTANEOUS at 10:18

## 2023-08-10 RX ADMIN — DIPHENHYDRAMINE HYDROCHLORIDE 25 MG: 25 TABLET ORAL at 00:33

## 2023-08-10 RX ADMIN — ACETAMINOPHEN 650 MG: 325 TABLET ORAL at 23:25

## 2023-08-10 RX ADMIN — MINOXIDIL 2.5 MG: 2.5 TABLET ORAL at 19:49

## 2023-08-10 RX ADMIN — CLONIDINE HYDROCHLORIDE 0.2 MG: 0.2 TABLET ORAL at 10:18

## 2023-08-10 RX ADMIN — NIFEDIPINE 60 MG: 60 TABLET, EXTENDED RELEASE ORAL at 19:49

## 2023-08-10 RX ADMIN — ONDANSETRON 4 MG: 2 INJECTION INTRAMUSCULAR; INTRAVENOUS at 10:26

## 2023-08-10 RX ADMIN — CLONIDINE HYDROCHLORIDE 0.2 MG: 0.2 TABLET ORAL at 15:19

## 2023-08-10 RX ADMIN — LEVOTHYROXINE SODIUM 100 MCG: 0.1 TABLET ORAL at 10:18

## 2023-08-10 RX ADMIN — HEPARIN SODIUM 5000 UNITS: 5000 INJECTION INTRAVENOUS; SUBCUTANEOUS at 06:31

## 2023-08-10 ASSESSMENT — PAIN SCALES - GENERAL
PAINLEVEL_OUTOF10: 0

## 2023-08-10 ASSESSMENT — PAIN DESCRIPTION - ORIENTATION: ORIENTATION: LOWER

## 2023-08-10 ASSESSMENT — PAIN DESCRIPTION - DESCRIPTORS: DESCRIPTORS: ACHING

## 2023-08-10 ASSESSMENT — PAIN DESCRIPTION - LOCATION: LOCATION: BACK

## 2023-08-10 NOTE — CARE COORDINATION
Case Management Assessment  Initial Evaluation    Date/Time of Evaluation: 8/10/2023 10:22 AM  Assessment Completed by: Ana Dietz RN    If patient is discharged prior to next notation, then this note serves as note for discharge by case management. Patient Name: Richard Bro                   YOB: 1955  Diagnosis: Hypercalcemia [E83.52]                   Date / Time: 8/9/2023 11:14 AM  Location: [address] [place] [address]     Patient Admission Status: Inpatient   Readmission Risk Low 0-14, Mod 15-19), High > 20: Readmission Risk Score: 11.2    Current PCP: MEGAN Goodwin  PCP verified by CM? Yes    Chart Reviewed: Yes      History Provided by: Patient  Patient Orientation: Alert and Oriented    Patient Cognition: Alert    Hospitalization in the last 30 days (Readmission):  No    If yes, Readmission Assessment in CM Navigator will be completed. Advance Directives:      Code Status: Full Code   Patient's Primary Decision Maker is: Legal Next of Kin      Discharge Planning:    Patient lives with: Spouse/Significant Other Type of Home: House  Primary Care Giver: Self  Patient Support Systems include: Spouse/Significant Other   Current Financial resources: Medicare  Current community resources: Other (Comment) (MWF HD at [facility])  Current services prior to admission: None            Current DME:              Type of Home Care services:  IV Therapy    ADLS  Prior functional level: Assistance with the following:, Other (see comment) ( drives to dialysis)  Current functional level: Assistance with the following:, Other (see comment) ( drives to dialysis)    Family can provide assistance at DC: Yes  Would you like Case Management to discuss the discharge plan with any other family members/significant others, and if so, who?  No  Plans to Return to Present Housing: Yes  Other Identified Issues/Barriers to RETURNING to current housing: none  Potential Assistance needed at discharge: Outpatient IV (Dialysis)            Potential DME:    Patient expects to discharge to: House  Plan for transportation at discharge:      Financial    Payor: [address] / Plan: MEDICARE PART A AND B / Product Type: *No Product type* /     Does insurance require precert for SNF: No    Potential assistance Purchasing Medications:    Meds-to-Beds request: Yes      Axel Perea [id number] - Melissa, [address] [place] [address]  [address] [place] [address]  Phone: [phone] Fax: [phone]      Notes:    Factors facilitating achievement of predicted outcomes: Family support and Cooperative    Barriers to discharge: Medical complications    Additional Case Management Notes: creat 3.4, Ca+ 10.7, Pth 493.5. HD yesterday. Miacalcin injection bis. Parathyroid scan pending. Nephrology following. Endocrinology consult pending. Procedure: 8/9 MRI pelvis: Advanced arthritic changes of the right hip including an inflammatory component with developing protrusio deformity. Moderate right hip effusion. Markedly distended gallbladder containing gallstones    The Plan for Transition of Care is related to the following treatment goals of Hypercalcemia [E83.52]    Patient Goals/Plan/Treatment Preferences: Met with Ravi Hartman, she is from home with her . She uses a cane to amb. She is a MWF HD at [facility],  transports. She declines HH, denies needs. Transportation/Food Security/Housekeeping Addressed: No issues identified.      Kenton Iqbal RN  Case Management Department

## 2023-08-10 NOTE — FLOWSHEET NOTE
08/10/23 0912   Safe Environment   Safety Measures Bed/Chair-Wheels locked; Bed in low position;Side rails X 2  (Virtual Safety round)     Pt does not respond to verbal prompts. This vrn verbalized that camera would be turned on at this time to complete safety check. Patient not observed in room at this time.

## 2023-08-10 NOTE — PROGRESS NOTES
Patient admitted to room [facility] 006. Alert and oriented x4. Call light within reach. admitted for high calcium levels. Patient oriented to room and call light system. Dr Main Chang at bedside to assess pt. All fall precautions in place. Pt stand by assist due to weakness. Bed alarm on.

## 2023-08-10 NOTE — PROGRESS NOTES
Introduced myself to patient. This VN was given permission to access cameras by patient. Virtual Nurse reviewed admission questions on navigator with patient. Patient answered question as requested. Patient verbalized understanding of all educational information given including safety, fall risk, pain, call light use and infection prevention education. Patient resting comfortably in bed. Call light observed to be at bedside and within reach of the patient. Patient's bed is noted to be in the lowest position. No acute distress noted. No needs or concerns were voiced at this time.

## 2023-08-10 NOTE — PLAN OF CARE
Problem: Discharge Planning  Goal: Discharge to home or other facility with appropriate resources  8/10/2023 0244 by Danae Raygoza RN  Outcome: Progressing     Problem: Pain  Goal: Verbalizes/displays adequate comfort level or baseline comfort level  8/10/2023 0244 by Danae Raygoza RN  Outcome: Progressing     Problem: ABCDS Injury Assessment  Goal: Absence of physical injury  8/10/2023 0244 by Danae Raygoza RN  Outcome: Progressing     Problem: Safety - Adult  Goal: Free from fall injury  8/10/2023 0244 by Danae Raygoza RN  Outcome: Progressing     Problem: Cardiovascular - Adult  Goal: Maintains optimal cardiac output and hemodynamic stability  8/10/2023 0244 by Danae Raygoza RN  Outcome: Progressing

## 2023-08-10 NOTE — PLAN OF CARE
Problem: Discharge Planning  Goal: Discharge to home or other facility with appropriate resources  Outcome: Progressing     Problem: Pain  Goal: Verbalizes/displays adequate comfort level or baseline comfort level  Outcome: Progressing     Problem: ABCDS Injury Assessment  Goal: Absence of physical injury  Outcome: Progressing     Problem: Safety - Adult  Goal: Free from fall injury  Outcome: Progressing     Problem: Cardiovascular - Adult  Goal: Maintains optimal cardiac output and hemodynamic stability  Outcome: Progressing     Problem: Skin/Tissue Integrity - Adult  Goal: Skin integrity remains intact  Outcome: Progressing

## 2023-08-10 NOTE — PROGRESS NOTES
4 Eyes Skin Assessment     NAME:  Romero Perez  YOB: 1955  MEDICAL RECORD NUMBER:  [id number]    The patient is being assessed for  Admission    I agree that at least one RN has performed a thorough Head to Toe Skin Assessment on the patient. ALL assessment sites listed below have been assessed. Areas assessed by both nurses:    Head, Face, Ears, Shoulders, Back, Chest, Arms, Elbows, Hands, Sacrum. Buttock, Coccyx, Ischium, Legs. Feet and Heels, and Under Medical Devices         Does the Patient have a Wound?  No wound,   R shoulder, rash area, two spots open from itching        Samir Prevention initiated by RN: No  Wound Care Orders initiated by RN: No    Pressure Injury (Stage 3,4, Unstageable, DTI, NWPT, and Complex wounds) if present, place Wound referral order by RN under : No    New Ostomies, if present place, Ostomy referral order under : No     Nurse 1 eSignature: Electronically signed by Lilian Dorsey RN on 8/10/23 at 2:50 AM EDT    **SHARE this note so that the co-signing nurse can place an eSignature**    Nurse 2 eSignature: {Esignature:[id number]}

## 2023-08-10 NOTE — CONSULTS
without obvious deformity. Pupils equal, round, and reactive to light. Extra ocular muscles intact. Conjunctivae/corneas clear. Neck: Supple, with full range of motion. No jugular venous distention. Trachea midline. Respiratory:  Normal respiratory effort. Clear to auscultation, bilaterally without Rales/Wheezes/Rhonchi. Cardiovascular: Regular rate and rhythm with normal S1/S2 without rubs or gallops. 2/6 Systolic Murmur noted. Abdomen: Soft, non-tender, non-distended with normal bowel sounds. Musculoskeletal:  No clubbing, cyanosis or edema bilaterally. Skin: Skin color, texture, turgor normal.  No rashes or lesions. Neurologic:  Neurovascularly intact without any focal sensory/motor deficits. Cranial nerves: II-XII intact, grossly non-focal.  Psychiatric: Alert and oriented, thought content appropriate, normal insight  Capillary Refill: Brisk,< 3 seconds   Peripheral Pulses: +2 palpable, equal bilaterally     Labs:     Recent Labs     08/09/23  1135 08/10/23  0515   WBC 6.4 6.3   HGB 11.0* 11.2*   HCT 34.1* 33.4*    301     Recent Labs     08/09/23  1135 08/10/23  0515    137   K 4.1 3.6   CL 93* 92*   CO2 28 30   BUN 37* 13   CREATININE 6.5* 3.4*   CALCIUM 13.3* 10.7*   PHOS  --  3.8     Recent Labs     08/09/23  1135   AST 10   ALT <5*   BILITOT 0.3   ALKPHOS 113     No results for input(s): INR in the last 72 hours. No results for input(s): Daylene Dominguez in the last 72 hours. Urinalysis:  No results found for: Zachary Eastern, BACTERIA, RBCUA, BLOODU, 1 Maximino Espana, [place]    Radiology: I have reviewed the radiology reports with the following interpretation:     XR CHEST PORTABLE   Final Result   Impression:      The lungs are clear. This document has been electronically signed by: Pete Rasheed MD on    08/09/2023 09:23 PM      MRI PELVIS WO CONTRAST   Final Result   Impression:   1.  Advanced arthritic changes of the right hip including an inflammatory    component with developing protrusio deformity. 2. Moderate right hip effusion. 3. No current evidence of malignant involvement at the level of the    pelvis. 4. Markedly distended gallbladder containing gallstones. This document has been electronically signed by: Ayleen Cobos MD on    08/09/2023 08:28 PM      NM PARATHYROID SCAN    (Results Pending)          EKG:  I have reviewed the EKG with the following interpretation:    NSR, LAD    ASSESSMENT / PLAN:   No diagnosis found. [unfilled]         ASSESSMENT:    Active Hospital Problems    Diagnosis Date Noted    Hypercalcemia [E83.52] 01/13/2021       PLAN:    Acute on chronic hypercalcemia - Primary/Nephrology managing. Has been on Xgeva in past? No formal endocrine evaluation in past. S/p Calcitonin 240 units x2 doses. Zometa 2mg x1 ordered per Nephrology. SPEP/Immunofixation pending  PTHrP Pending  Evaluation as below  Hyperparathyroidism -  on 8/10/23. Pending NM PT Scan. Suspect primary hyperparathyroid. Will await scan above. May need to consider surgical intervention for definitive management. Further recommendations pending clinical course and findings  Vitamin D Deficiency: 16 on 8/10/23  D1,25 Pending  ESRD on HD Nephrology following. Hypothyroid: Continue Synthroid daily  8/9/23 TSH 0.081. Similar to previous. 8/2/23 TSH 0.02  FT4 1.95  Hx of Cervical Cancer: F/w Oncology. Squamous Cell Carcinoma of cervix, Stage IIB (T2N0M0) dx in 2019. Noted. Case and plan developed in coordination with Dr. Josefa Francis    Thank you for the consultation.     Electronically signed by Chino Dacosta DO on 8/10/2023 at 8:12 AM

## 2023-08-11 PROBLEM — E55.9 HYPOVITAMINOSIS D: Status: ACTIVE | Noted: 2023-08-11

## 2023-08-11 PROBLEM — R94.6 ABNORMAL THYROID FUNCTION TEST: Status: ACTIVE | Noted: 2023-08-11

## 2023-08-11 LAB
ANION GAP SERPL CALC-SCNC: 14 MEQ/L (ref 8–16)
BUN SERPL-MCNC: 26 MG/DL (ref 7–22)
CALCIUM SERPL-MCNC: 11.1 MG/DL (ref 8.5–10.5)
CHLORIDE SERPL-SCNC: 89 MEQ/L (ref 98–111)
CO2 SERPL-SCNC: 30 MEQ/L (ref 23–33)
CREAT SERPL-MCNC: 5.4 MG/DL (ref 0.4–1.2)
DEPRECATED RDW RBC AUTO: 44.9 FL (ref 35–45)
EKG ATRIAL RATE: 62 BPM
EKG ATRIAL RATE: 66 BPM
EKG P AXIS: 63 DEGREES
EKG P-R INTERVAL: 140 MS
EKG P-R INTERVAL: 162 MS
EKG Q-T INTERVAL: 416 MS
EKG Q-T INTERVAL: 458 MS
EKG QRS DURATION: 86 MS
EKG QRS DURATION: 92 MS
EKG QTC CALCULATION (BAZETT): 422 MS
EKG QTC CALCULATION (BAZETT): 480 MS
EKG R AXIS: -30 DEGREES
EKG R AXIS: -34 DEGREES
EKG T AXIS: 90 DEGREES
EKG T AXIS: 92 DEGREES
EKG VENTRICULAR RATE: 62 BPM
EKG VENTRICULAR RATE: 66 BPM
ERYTHROCYTE [DISTWIDTH] IN BLOOD BY AUTOMATED COUNT: 13.9 % (ref 11.5–14.5)
GFR SERPL CREATININE-BSD FRML MDRD: 8 ML/MIN/1.73M2
GLUCOSE SERPL-MCNC: 94 MG/DL (ref 70–108)
HCT VFR BLD AUTO: 31.8 % (ref 37–47)
HGB BLD-MCNC: 10.5 GM/DL (ref 12–16)
MAGNESIUM SERPL-MCNC: 2.3 MG/DL (ref 1.6–2.4)
MCH RBC QN AUTO: 29.7 PG (ref 26–33)
MCHC RBC AUTO-ENTMCNC: 33 GM/DL (ref 32.2–35.5)
MCV RBC AUTO: 89.8 FL (ref 81–99)
PHOSPHATE SERPL-MCNC: 7.4 MG/DL (ref 2.4–4.7)
PLATELET # BLD AUTO: 271 THOU/MM3 (ref 130–400)
PMV BLD AUTO: 9.8 FL (ref 9.4–12.4)
POTASSIUM SERPL-SCNC: 4.2 MEQ/L (ref 3.5–5.2)
RBC # BLD AUTO: 3.54 MILL/MM3 (ref 4.2–5.4)
SODIUM SERPL-SCNC: 133 MEQ/L (ref 135–145)
WBC # BLD AUTO: 5.1 THOU/MM3 (ref 4.8–10.8)

## 2023-08-11 PROCEDURE — 90935 HEMODIALYSIS ONE EVALUATION: CPT

## 2023-08-11 PROCEDURE — 83735 ASSAY OF MAGNESIUM: CPT

## 2023-08-11 PROCEDURE — 99232 SBSQ HOSP IP/OBS MODERATE 35: CPT | Performed by: INTERNAL MEDICINE

## 2023-08-11 PROCEDURE — 6360000002 HC RX W HCPCS: Performed by: INTERNAL MEDICINE

## 2023-08-11 PROCEDURE — 6370000000 HC RX 637 (ALT 250 FOR IP): Performed by: INTERNAL MEDICINE

## 2023-08-11 PROCEDURE — 1200000000 HC SEMI PRIVATE

## 2023-08-11 PROCEDURE — 2580000003 HC RX 258: Performed by: INTERNAL MEDICINE

## 2023-08-11 PROCEDURE — 85027 COMPLETE CBC AUTOMATED: CPT

## 2023-08-11 PROCEDURE — 36415 COLL VENOUS BLD VENIPUNCTURE: CPT

## 2023-08-11 PROCEDURE — 84100 ASSAY OF PHOSPHORUS: CPT

## 2023-08-11 PROCEDURE — 80048 BASIC METABOLIC PNL TOTAL CA: CPT

## 2023-08-11 RX ORDER — CINACALCET 30 MG/1
60 TABLET, FILM COATED ORAL DAILY
Status: DISCONTINUED | OUTPATIENT
Start: 2023-08-11 | End: 2023-08-12 | Stop reason: HOSPADM

## 2023-08-11 RX ORDER — SEVELAMER CARBONATE 800 MG/1
1600 TABLET, FILM COATED ORAL
Status: DISCONTINUED | OUTPATIENT
Start: 2023-08-11 | End: 2023-08-12 | Stop reason: HOSPADM

## 2023-08-11 RX ADMIN — ACETAMINOPHEN 650 MG: 325 TABLET ORAL at 21:25

## 2023-08-11 RX ADMIN — CLONIDINE HYDROCHLORIDE 0.2 MG: 0.2 TABLET ORAL at 08:46

## 2023-08-11 RX ADMIN — MINOXIDIL 2.5 MG: 2.5 TABLET ORAL at 21:25

## 2023-08-11 RX ADMIN — SODIUM CHLORIDE, PRESERVATIVE FREE 10 ML: 5 INJECTION INTRAVENOUS at 21:26

## 2023-08-11 RX ADMIN — Medication 1 TABLET: at 08:46

## 2023-08-11 RX ADMIN — CLONIDINE HYDROCHLORIDE 0.2 MG: 0.2 TABLET ORAL at 21:24

## 2023-08-11 RX ADMIN — HEPARIN SODIUM 5000 UNITS: 5000 INJECTION INTRAVENOUS; SUBCUTANEOUS at 16:03

## 2023-08-11 RX ADMIN — NIFEDIPINE 60 MG: 60 TABLET, EXTENDED RELEASE ORAL at 08:46

## 2023-08-11 RX ADMIN — CINACALCET HYDROCHLORIDE 60 MG: 30 TABLET, FILM COATED ORAL at 16:03

## 2023-08-11 RX ADMIN — TEMAZEPAM 15 MG: 15 CAPSULE ORAL at 22:06

## 2023-08-11 RX ADMIN — HEPARIN SODIUM 5000 UNITS: 5000 INJECTION INTRAVENOUS; SUBCUTANEOUS at 05:23

## 2023-08-11 RX ADMIN — LEVOTHYROXINE SODIUM 100 MCG: 0.1 TABLET ORAL at 05:23

## 2023-08-11 RX ADMIN — SODIUM CHLORIDE, PRESERVATIVE FREE 10 ML: 5 INJECTION INTRAVENOUS at 08:46

## 2023-08-11 RX ADMIN — HEPARIN SODIUM 5000 UNITS: 5000 INJECTION INTRAVENOUS; SUBCUTANEOUS at 21:25

## 2023-08-11 RX ADMIN — MINOXIDIL 2.5 MG: 2.5 TABLET ORAL at 08:46

## 2023-08-11 RX ADMIN — NIFEDIPINE 60 MG: 60 TABLET, EXTENDED RELEASE ORAL at 21:25

## 2023-08-11 RX ADMIN — CLONIDINE HYDROCHLORIDE 0.2 MG: 0.2 TABLET ORAL at 16:03

## 2023-08-11 RX ADMIN — SEVELAMER CARBONATE 1600 MG: 800 TABLET, FILM COATED ORAL at 16:03

## 2023-08-11 RX ADMIN — ASPIRIN 81 MG: 81 TABLET, COATED ORAL at 08:46

## 2023-08-11 ASSESSMENT — PAIN DESCRIPTION - DESCRIPTORS: DESCRIPTORS: ACHING

## 2023-08-11 ASSESSMENT — PAIN SCALES - GENERAL
PAINLEVEL_OUTOF10: 1
PAINLEVEL_OUTOF10: 3
PAINLEVEL_OUTOF10: 0
PAINLEVEL_OUTOF10: 0

## 2023-08-11 ASSESSMENT — PAIN DESCRIPTION - LOCATION: LOCATION: BACK;HIP

## 2023-08-11 ASSESSMENT — PAIN DESCRIPTION - ORIENTATION: ORIENTATION: LOWER

## 2023-08-11 NOTE — PLAN OF CARE
Problem: Discharge Planning  Goal: Discharge to home or other facility with appropriate resources  Outcome: Progressing  Flowsheets (Taken 8/10/2023 2351)  Discharge to home or other facility with appropriate resources:   Identify barriers to discharge with patient and caregiver   Arrange for needed discharge resources and transportation as appropriate   Identify discharge learning needs (meds, wound care, etc)   Arrange for interpreters to assist at discharge as needed   Refer to discharge planning if patient needs post-hospital services based on physician order or complex needs related to functional status, cognitive ability or social support system     Problem: Pain  Goal: Verbalizes/displays adequate comfort level or baseline comfort level  Outcome: Progressing  Flowsheets (Taken 8/10/2023 2351)  Verbalizes/displays adequate comfort level or baseline comfort level:   Encourage patient to monitor pain and request assistance   Assess pain using appropriate pain scale   Administer analgesics based on type and severity of pain and evaluate response   Implement non-pharmacological measures as appropriate and evaluate response   Consider cultural and social influences on pain and pain management   Notify Licensed Independent Practitioner if interventions unsuccessful or patient reports new pain     Problem: ABCDS Injury Assessment  Goal: Absence of physical injury  Outcome: Progressing  Flowsheets (Taken 8/10/2023 2351)  Absence of Physical Injury: Implement safety measures based on patient assessment     Problem: Safety - Adult  Goal: Free from fall injury  Outcome: Progressing  Flowsheets (Taken 8/10/2023 2351)  Free From Fall Injury:   Instruct family/caregiver on patient safety   Based on caregiver fall risk screen, instruct family/caregiver to ask for assistance with transferring infant if caregiver noted to have fall risk factors     Problem: Cardiovascular - Adult  Goal: Maintains optimal cardiac output and appropriate     Problem: Hematologic - Adult  Goal: Maintains hematologic stability  Outcome: Progressing  Flowsheets (Taken 8/10/2023 1391)  Maintains hematologic stability:   Assess for signs and symptoms of bleeding or hemorrhage   Monitor labs for bleeding or clotting disorders   Administer blood products/factors as ordered     Problem: Skin/Tissue Integrity  Goal: Absence of new skin breakdown  Description: 1. Monitor for areas of redness and/or skin breakdown  2. Assess vascular access sites hourly  3. Every 4-6 hours minimum:  Change oxygen saturation probe site  4. Every 4-6 hours:  If on nasal continuous positive airway pressure, respiratory therapy assess nares and determine need for appliance change or resting period.   Outcome: Progressing     Problem: Chronic Conditions and Co-morbidities  Goal: Patient's chronic conditions and co-morbidity symptoms are monitored and maintained or improved  Outcome: Progressing

## 2023-08-11 NOTE — PROGRESS NOTES
Endocrine Progress Note    Patient:  Vitor Levine  YOB: 1955    MRN: [id number]         Chief Complaint:  Hypercalcemia    Date of Service: 08/11/23     Initial HPI:     75 y.o. female who we are asked to see/evaluate by Yary Watson MD for medical management of hypercalcemia. Patient states she has been dealing with hypercalcemia for 4 years. She has not pursued further evaluation for various reasons. She states there was differing opinions on physicians. Chart review demonstrates patient encouraged to be evaluated by Endocrinology by multiple physicians however patient has not. She has ESRD on HD, Hx of cervical Ca. She has not had pelvic scans performed recently. She has hypercalcemia and hyperparathyroidism. She was admitted to the Hospital after worsening calcium levels. She was having severe bone/muscle pain similar to episodes in past. Endocrinology was consulted for further evaluation. Subjective (Last 24hrs): Evaluated at bedside. NAEO. Patient doing well. States she feels much better than previously. Calcium up a bit today. Due for dialysis. No longer has bone pain. She had nuc med scan done previous day which is consistent with parathyroid adenoma. She will need definitive surgical management. Denies headache, fever, chills, chest pain, shortness breath, abdominal pain.     Past Medical History:   Diagnosis Date    Anxiety     Diarrhea     Dryness of periwound skin     Gout, joint     Hernia of abdominal cavity     Hypertension     Kidney disease     Mouth dryness        Past Surgical History:   Procedure Laterality Date    AV FISTULA CREATION Left     URETER STENT PLACEMENT  11/2019       Social History     Tobacco Use    Smoking status: Never    Smokeless tobacco: Never   Substance Use Topics    Alcohol use: Not Currently        Family History   Problem Relation Age of Onset    Cancer Father     No Known Problems Sister     No Known Problems Brother         Current

## 2023-08-12 VITALS
DIASTOLIC BLOOD PRESSURE: 70 MMHG | OXYGEN SATURATION: 97 % | HEART RATE: 50 BPM | RESPIRATION RATE: 16 BRPM | SYSTOLIC BLOOD PRESSURE: 154 MMHG | BODY MASS INDEX: 20.99 KG/M2 | WEIGHT: 126 LBS | HEIGHT: 65 IN | TEMPERATURE: 97.5 F

## 2023-08-12 PROBLEM — E21.3 HYPERPARATHYROIDISM (HCC): Status: ACTIVE | Noted: 2023-08-12

## 2023-08-12 PROBLEM — D35.1 PARATHYROID ADENOMA: Status: ACTIVE | Noted: 2023-08-12

## 2023-08-12 PROBLEM — Z99.2 ESRD (END STAGE RENAL DISEASE) ON DIALYSIS (HCC): Status: ACTIVE | Noted: 2023-08-12

## 2023-08-12 PROBLEM — N18.6 ESRD (END STAGE RENAL DISEASE) ON DIALYSIS (HCC): Status: ACTIVE | Noted: 2023-08-12

## 2023-08-12 LAB
1,25(OH)2D3 SERPL-MCNC: < 5 PG/ML (ref 19.9–79.3)
ANION GAP SERPL CALC-SCNC: 15 MEQ/L (ref 8–16)
BUN SERPL-MCNC: 14 MG/DL (ref 7–22)
CALCIUM SERPL-MCNC: 10 MG/DL (ref 8.5–10.5)
CHLORIDE SERPL-SCNC: 96 MEQ/L (ref 98–111)
CO2 SERPL-SCNC: 28 MEQ/L (ref 23–33)
CREAT SERPL-MCNC: 3.4 MG/DL (ref 0.4–1.2)
DEPRECATED RDW RBC AUTO: 45.7 FL (ref 35–45)
ERYTHROCYTE [DISTWIDTH] IN BLOOD BY AUTOMATED COUNT: 13.9 % (ref 11.5–14.5)
GFR SERPL CREATININE-BSD FRML MDRD: 14 ML/MIN/1.73M2
GLUCOSE SERPL-MCNC: 96 MG/DL (ref 70–108)
HCT VFR BLD AUTO: 33.2 % (ref 37–47)
HGB BLD-MCNC: 10.7 GM/DL (ref 12–16)
MAGNESIUM SERPL-MCNC: 2.2 MG/DL (ref 1.6–2.4)
MCH RBC QN AUTO: 29.3 PG (ref 26–33)
MCHC RBC AUTO-ENTMCNC: 32.2 GM/DL (ref 32.2–35.5)
MCV RBC AUTO: 91 FL (ref 81–99)
PHOSPHATE SERPL-MCNC: 4.6 MG/DL (ref 2.4–4.7)
PLATELET # BLD AUTO: 254 THOU/MM3 (ref 130–400)
PMV BLD AUTO: 10 FL (ref 9.4–12.4)
POTASSIUM SERPL-SCNC: 3.4 MEQ/L (ref 3.5–5.2)
RBC # BLD AUTO: 3.65 MILL/MM3 (ref 4.2–5.4)
SODIUM SERPL-SCNC: 139 MEQ/L (ref 135–145)
WBC # BLD AUTO: 5.3 THOU/MM3 (ref 4.8–10.8)

## 2023-08-12 PROCEDURE — 2580000003 HC RX 258: Performed by: INTERNAL MEDICINE

## 2023-08-12 PROCEDURE — 36415 COLL VENOUS BLD VENIPUNCTURE: CPT

## 2023-08-12 PROCEDURE — 99231 SBSQ HOSP IP/OBS SF/LOW 25: CPT | Performed by: OTOLARYNGOLOGY

## 2023-08-12 PROCEDURE — 80048 BASIC METABOLIC PNL TOTAL CA: CPT

## 2023-08-12 PROCEDURE — 83735 ASSAY OF MAGNESIUM: CPT

## 2023-08-12 PROCEDURE — 85027 COMPLETE CBC AUTOMATED: CPT

## 2023-08-12 PROCEDURE — 6360000002 HC RX W HCPCS: Performed by: INTERNAL MEDICINE

## 2023-08-12 PROCEDURE — 6370000000 HC RX 637 (ALT 250 FOR IP): Performed by: INTERNAL MEDICINE

## 2023-08-12 PROCEDURE — 99232 SBSQ HOSP IP/OBS MODERATE 35: CPT | Performed by: INTERNAL MEDICINE

## 2023-08-12 PROCEDURE — 84100 ASSAY OF PHOSPHORUS: CPT

## 2023-08-12 PROCEDURE — 84481 FREE ASSAY (FT-3): CPT

## 2023-08-12 RX ORDER — CINACALCET 60 MG/1
60 TABLET, FILM COATED ORAL DAILY
Qty: 30 TABLET | Refills: 3 | Status: SHIPPED | OUTPATIENT
Start: 2023-08-13

## 2023-08-12 RX ORDER — SEVELAMER CARBONATE 800 MG/1
1600 TABLET, FILM COATED ORAL
Qty: 90 TABLET | Refills: 3 | Status: SHIPPED | OUTPATIENT
Start: 2023-08-12

## 2023-08-12 RX ADMIN — NIFEDIPINE 60 MG: 60 TABLET, EXTENDED RELEASE ORAL at 09:37

## 2023-08-12 RX ADMIN — Medication 1 TABLET: at 09:37

## 2023-08-12 RX ADMIN — ASPIRIN 81 MG: 81 TABLET, COATED ORAL at 09:38

## 2023-08-12 RX ADMIN — MINOXIDIL 2.5 MG: 2.5 TABLET ORAL at 09:37

## 2023-08-12 RX ADMIN — SODIUM CHLORIDE, PRESERVATIVE FREE 10 ML: 5 INJECTION INTRAVENOUS at 09:37

## 2023-08-12 RX ADMIN — CLONIDINE HYDROCHLORIDE 0.2 MG: 0.2 TABLET ORAL at 13:12

## 2023-08-12 RX ADMIN — CINACALCET HYDROCHLORIDE 60 MG: 30 TABLET, FILM COATED ORAL at 09:37

## 2023-08-12 RX ADMIN — SEVELAMER CARBONATE 1600 MG: 800 TABLET, FILM COATED ORAL at 09:37

## 2023-08-12 RX ADMIN — LEVOTHYROXINE SODIUM 100 MCG: 0.1 TABLET ORAL at 05:51

## 2023-08-12 RX ADMIN — HEPARIN SODIUM 5000 UNITS: 5000 INJECTION INTRAVENOUS; SUBCUTANEOUS at 05:51

## 2023-08-12 RX ADMIN — CLONIDINE HYDROCHLORIDE 0.2 MG: 0.2 TABLET ORAL at 09:37

## 2023-08-12 RX ADMIN — SEVELAMER CARBONATE 1600 MG: 800 TABLET, FILM COATED ORAL at 13:13

## 2023-08-12 ASSESSMENT — PAIN SCALES - GENERAL
PAINLEVEL_OUTOF10: 2
PAINLEVEL_OUTOF10: 3

## 2023-08-12 NOTE — FLOWSHEET NOTE
08/12/23 [facility]   Safe Environment   Safety Measures Call light within reach  (VN safety round complete.)     Denied any needs at this time.

## 2023-08-12 NOTE — PROGRESS NOTES
This nurse provided written and verbal discharge instructions to patient and spouse. Patient and spouse stated verbal understanding. Patient Discharged home with spouse in personal vehicle.

## 2023-08-12 NOTE — DISCHARGE SUMMARY
Discharge Summary    Griselda Hendrix  :  1955  MRN:  [id number]    Admit date:  2023  Discharge date:      Admitting Physician:  Jose Crews MD    Discharge Diagnoses:    Severe Hypercalcemia  Hyperparathyroidism secondary to #3  Parathyroid adenoma              - ENT consult placed, recommended f/up as OP  ESRD  HTN  Hypothyroidism  H/o cervical cancer s/p XRT      Admission Condition:  serious  Discharged Condition:  good    Hospital Course:   Patient with h/o ESRD on HD, cervical cancer s/p XRT was admitted for severe hypercalcemia, Ca 13.3; she was treated with calcitonin and Zoledronic acid  with improvement. nephrologist added sensipar. NM parathyroid scan showed a parathyroid adenoma. ENT consulted, chose to see as OP. Endocrine will follow as OP.       Discharge Medications:         Medication List        START taking these medications      cinacalcet 60 MG tablet  Commonly known as: SENSIPAR  Take 1 tablet by mouth daily  Start taking on: 2023     sevelamer 800 MG tablet  Commonly known as: RENVELA  Take 2 tablets by mouth 3 times daily (with meals)            CONTINUE taking these medications      aspirin 81 MG EC tablet     cloNIDine 0.2 MG tablet  Commonly known as: CATAPRES     levothyroxine 100 MCG tablet  Commonly known as: SYNTHROID     minoxidil 2.5 MG tablet  Commonly known as: LONITEN     NIFEdipine 60 MG extended release tablet  Commonly known as: ADALAT CC     PHOSPHOROUS PO     Renal-Josie 0.8 MG Tabs     temazepam 30 MG capsule  Commonly known as: RESTORIL            STOP taking these medications      diazePAM 5 MG tablet  Commonly known as: VALIUM               Where to Get Your Medications        These medications were sent to [id number]  [address], [address] [facility]      Phone: [phone]   cinacalcet 60 MG tablet  sevelamer 800 MG tablet         Consults:  nephrology and

## 2023-08-12 NOTE — CONSULTS
Department of Otolaryngology  Consult Note    Patient Name: Christian Chang  Reason for Consult: Severe hyperparathyroidism  Requesting Physician: Dr. Alli Winchester: Hyperparathyroidism    History Obtained From:  patient, electronic medical record    HISTORY OF PRESENT ILLNESS:      The patient is a 75 y.o. female whom I was asked to see regarding large parathyroid adenoma detected on sestamibi scan associated with extreme hyperparathyroidism. Patient is also hyperthyroid with normal T4 but with a extremely low TSH. No T3 is available in the chart to rule out T3 thyrotoxicosis. She has been taking 100 mcg T4 daily. Dr Crow's resident indicated there were some medical issues to straighten out before scheduling for surgery.     Past Medical History:    Past Medical History:   Diagnosis Date    Anxiety     Diarrhea     Dryness of periwound skin     Gout, joint     Hernia of abdominal cavity     Hypertension     Kidney disease     Mouth dryness        Past Surgical History:    Past Surgical History:   Procedure Laterality Date    AV FISTULA CREATION Left     URETER STENT PLACEMENT  11/2019       Current Medications:   Current Facility-Administered Medications   Medication Dose Route Frequency Provider Last Rate Last Admin    cinacalcet (SENSIPAR) tablet 60 mg  60 mg Oral Daily Garfield Andrade MD   60 mg at 08/12/23 0937    sevelamer (RENVELA) tablet 1,600 mg  1,600 mg Oral TID  Garfield Andrade MD   1,600 mg at 08/12/23 1313    diphenhydrAMINE (BENADRYL) tablet 25 mg  25 mg Oral Q6H PRN Frankie Gardner MD   25 mg at 08/10/23 0033    prochlorperazine (COMPAZINE) tablet 10 mg  10 mg Oral Q6H PRN Frankie Gardner MD        aspirin EC tablet 81 mg  81 mg Oral Daily Frankie Gardner MD   81 mg at 08/12/23 5819    folbee plus tablet 1 tablet  1 tablet Oral Daily Frankie Gardner MD   1 tablet at 08/12/23 0937    cloNIDine (CATAPRES) tablet 0.2 mg  0.2 mg Oral TID Frankie Gardner MD   0.2 mg at 08/12/23 1312 levothyroxine (SYNTHROID) tablet 100 mcg  100 mcg Oral Daily Caleb Oh MD   100 mcg at 08/12/23 0551    minoxidil (LONITEN) tablet 2.5 mg  2.5 mg Oral BID Caleb Oh MD   2.5 mg at 08/12/23 0937    NIFEdipine (PROCARDIA XL) extended release tablet 60 mg  60 mg Oral BID Caleb Oh MD   60 mg at 08/12/23 0937    sodium chloride flush 0.9 % injection 5-40 mL  5-40 mL IntraVENous 2 times per day Caleb Oh MD   10 mL at 08/12/23 0937    sodium chloride flush 0.9 % injection 5-40 mL  5-40 mL IntraVENous PRN Caleb Oh MD        0.9 % sodium chloride infusion   IntraVENous PRN Caleb Oh MD        heparin (porcine) injection 5,000 Units  5,000 Units SubCUTAneous 3 times per day Caleb Oh MD   5,000 Units at 08/12/23 0551    ondansetron (ZOFRAN-ODT) disintegrating tablet 4 mg  4 mg Oral Q8H PRN Caleb Oh MD        Or    ondansetron (ZOFRAN) injection 4 mg  4 mg IntraVENous Q6H PRN Caleb Oh MD   4 mg at 08/10/23 1026    polyethylene glycol (GLYCOLAX) packet 17 g  17 g Oral Daily PRN Caleb Oh MD        acetaminophen (TYLENOL) tablet 650 mg  650 mg Oral Q6H PRN Caleb Oh MD   650 mg at 08/11/23 2125    Or    acetaminophen (TYLENOL) suppository 650 mg  650 mg Rectal Q6H PRN Cate Marques MD        temazepam (RESTORIL) capsule 15 mg  15 mg Oral Nightly PRN Caleb Oh MD   15 mg at 08/11/23 2206       Allergies:   Allergies   Allergen Reactions    Clopidogrel      Other reaction(s): Rash    Dye [Iodides] Swelling and Rash       Social History:    Social History     Socioeconomic History    Marital status:      Spouse name: None    Number of children: None    Years of education: None    Highest education level: None   Tobacco Use    Smoking status: Never    Smokeless tobacco: Never   Substance and Sexual Activity    Alcohol use: Not Currently    Drug use: Never    Sexual activity: Not Currently       Family History:   Family History   Problem Relation Age of Onset

## 2023-08-12 NOTE — PROGRESS NOTES
2123    Narrative:     1 view chest x-ray. Comparison: None. Findings:   No consolidation or effusion   Heart size normal, considering portable technique. No acute fracture    Impression:     Impression:   The lungs are clear. MRI PELVIS WO CONTRAST [[id number]]    Collected: 08/09/23 1928    Updated: 08/09/23 2029    Narrative:     MRI pelvis without contrast     Comparison: Images from prior studies are not available. Reference was   made to a report from a CT scan dated August 2, 2023. Findings: The cervix is small and hypointense, likely on the basis of scarring. No   focal mass appreciated. Unremarkable myometrium and endometrial canal.   Unremarkable urinary bladder. Ovaries not definitively seen. No suspicious adnexal mass. No pelvic free fluid or lymphadenopathy. Colonic diverticulosis without current evidence of diverticulitis at   visualized levels. Large ventral hernia containing segments of bowel   without evidence of dilatation or edema at visualized levels. Partial visualization of a distended gallbladder containing gallstones. There is a moderate right hip effusion. There is severe cartilage loss   within the right hip and there are foci of erosive change and/or   degenerative cyst formation associated with the right hip. There is a   developing protrusio deformity of the right hip. No significant bone   marrow edema. No gross evidence of avascular necrosis. Advanced facet osteoarthritis within the lower lumbar spine. Grade 1   anterolisthesis of L4 on L5. Degenerative type endplate marrow changes at   L4-L5. Impression:     Impression:   1. Advanced arthritic changes of the right hip including an inflammatory   component with developing protrusio deformity. 2. Moderate right hip effusion. 3. No current evidence of malignant involvement at the level of the   pelvis. 4. Markedly distended gallbladder containing gallstones.       [address] [facility] [[id number]]

## 2023-08-13 LAB
IMMUNOFIXATION ELECTROPHORESIS: NORMAL
T3FREE SERPL-MCNC: 1.86 PG/ML (ref 2.02–4.43)

## 2023-08-14 ENCOUNTER — TELEPHONE (OUTPATIENT)
Dept: ENT CLINIC | Age: 68
End: 2023-08-14

## 2023-08-14 ENCOUNTER — CLINICAL DOCUMENTATION ONLY (OUTPATIENT)
Facility: CLINIC | Age: 68
End: 2023-08-14

## 2023-08-14 ENCOUNTER — CARE COORDINATION (OUTPATIENT)
Dept: CASE MANAGEMENT | Age: 68
End: 2023-08-14

## 2023-08-14 NOTE — CARE COORDINATION
Care Transitions Outreach Attempt-Hypercalcemia    Call within 2 business days of discharge: Yes   Attempted to reach patient for transitions of care follow up. Unable to reach patient. Patient: Jovan Dominguez Patient : 1955 MRN: [id number]    Last Discharge [address]       Date Complaint Diagnosis Description Type Department Provider    23 Fatigue; Abnormal Lab Hypercalcemia . .. ED to Hosp-Admission (Discharged) (ADMITTED) Emma Scott MD; Sadaf Carranza, DO              Was this an external facility discharge? No Discharge Facility: [facility]    Noted following upcoming appointments from discharge chart review:   [facility] follow up appointment(s):   Future Appointments   Date Time Provider [address]   8/15/2023  3:15 PM Watson Flores MD [facility] A[facility]X AFL APEX END   2024 12:00 PM Tamara Peter MD [address]     1st attempt to contact pt for initial care transition follow up. Unable to reach pt. Left message with contact information and request for call back.     Jessika Crespo RN  Care Transition Nurse

## 2023-08-14 NOTE — TELEPHONE ENCOUNTER
Patient called and stated that she was in the ED and got discharged on 08/12/23. She stated that they told her she needed to call us and get an appointment ASAP. Upon looking at Dr. Danny Guzman note it will be a hospital f/u and from Dr. Janie German residents note it looks like there are some medical issues that need figured out first before surgery. Please advise.

## 2023-08-15 ENCOUNTER — HOSPITAL ENCOUNTER (OUTPATIENT)
Dept: ULTRASOUND IMAGING | Age: 68
Discharge: HOME | End: 2023-08-15
Attending: OTOLARYNGOLOGY
Payer: MEDICARE

## 2023-08-15 ENCOUNTER — CARE COORDINATION (OUTPATIENT)
Dept: CASE MANAGEMENT | Age: 68
End: 2023-08-15

## 2023-08-15 ENCOUNTER — OFFICE VISIT (OUTPATIENT)
Dept: ENT CLINIC | Age: 68
End: 2023-08-15
Payer: MEDICARE

## 2023-08-15 VITALS
OXYGEN SATURATION: 96 % | RESPIRATION RATE: 18 BRPM | SYSTOLIC BLOOD PRESSURE: 128 MMHG | TEMPERATURE: 97.4 F | HEIGHT: 65 IN | HEART RATE: 65 BPM | BODY MASS INDEX: 20.83 KG/M2 | WEIGHT: 125 LBS | DIASTOLIC BLOOD PRESSURE: 76 MMHG

## 2023-08-15 DIAGNOSIS — D35.1 PARATHYROID ADENOMA: ICD-10-CM

## 2023-08-15 DIAGNOSIS — E21.3 HYPERPARATHYROIDISM (HCC): ICD-10-CM

## 2023-08-15 DIAGNOSIS — E83.52 HYPERCALCEMIA: ICD-10-CM

## 2023-08-15 DIAGNOSIS — N18.4 CHRONIC RENAL INSUFFICIENCY, STAGE 4 (SEVERE) (HCC): ICD-10-CM

## 2023-08-15 DIAGNOSIS — E21.3 HYPERPARATHYROIDISM (HCC): Primary | ICD-10-CM

## 2023-08-15 DIAGNOSIS — N25.81 SECONDARY HYPERPARATHYROIDISM OF RENAL ORIGIN (HCC): ICD-10-CM

## 2023-08-15 PROCEDURE — G8420 CALC BMI NORM PARAMETERS: HCPCS | Performed by: OTOLARYNGOLOGY

## 2023-08-15 PROCEDURE — 99205 OFFICE O/P NEW HI 60 MIN: CPT | Performed by: OTOLARYNGOLOGY

## 2023-08-15 PROCEDURE — 1123F ACP DISCUSS/DSCN MKR DOCD: CPT | Performed by: OTOLARYNGOLOGY

## 2023-08-15 PROCEDURE — 76536 US EXAM OF HEAD AND NECK: CPT

## 2023-08-15 PROCEDURE — 1090F PRES/ABSN URINE INCON ASSESS: CPT | Performed by: OTOLARYNGOLOGY

## 2023-08-15 PROCEDURE — 3017F COLORECTAL CA SCREEN DOC REV: CPT | Performed by: OTOLARYNGOLOGY

## 2023-08-15 PROCEDURE — G8427 DOCREV CUR MEDS BY ELIG CLIN: HCPCS | Performed by: OTOLARYNGOLOGY

## 2023-08-15 PROCEDURE — 1036F TOBACCO NON-USER: CPT | Performed by: OTOLARYNGOLOGY

## 2023-08-15 PROCEDURE — G8400 PT W/DXA NO RESULTS DOC: HCPCS | Performed by: OTOLARYNGOLOGY

## 2023-08-15 PROCEDURE — 3074F SYST BP LT 130 MM HG: CPT | Performed by: OTOLARYNGOLOGY

## 2023-08-15 PROCEDURE — 3078F DIAST BP <80 MM HG: CPT | Performed by: OTOLARYNGOLOGY

## 2023-08-15 PROCEDURE — 1111F DSCHRG MED/CURRENT MED MERGE: CPT | Performed by: OTOLARYNGOLOGY

## 2023-08-15 ASSESSMENT — ENCOUNTER SYMPTOMS
FACIAL SWELLING: 0
CHEST TIGHTNESS: 0
RHINORRHEA: 0
VOMITING: 0
SORE THROAT: 0
TROUBLE SWALLOWING: 0
WHEEZING: 0
NAUSEA: 0
SINUS PRESSURE: 0
COUGH: 0
STRIDOR: 0
DIARRHEA: 0
ABDOMINAL PAIN: 0
VOICE CHANGE: 0
APNEA: 0
SHORTNESS OF BREATH: 0
COLOR CHANGE: 0
CHOKING: 0

## 2023-08-15 NOTE — CARE COORDINATION
[facility] Care Transitions Initial Follow Up Call    Call within 2 business days of discharge: Yes    Patient Current Location:  Home: [address] [place] [address]    Care Transition Nurse contacted the patient by telephone to perform post hospital discharge assessment. Verified name and  with patient as identifiers. Provided introduction to self, and explanation of the Care Transition Nurse role. Patient: Romero Perez Patient : 1955   MRN: [id number]  Reason for Admission: Fatigue, Abnormal labs, Hypercalcemia  Discharge Date: 23 RARS: Readmission Risk Score: 11.4      Last Discharge [address] [facility]       Date Complaint Diagnosis Description Type Department Provider    23 Fatigue; Abnormal Lab Hypercalcemia . .. ED to Hosp-Admission (Discharged) (ADMITTED) Haritha Guzman MD; Nir Rojas, DO            Was this an external facility discharge? No Discharge Facility: [address] [place] [address] to be reviewed by the provider   Additional needs identified to be addressed with provider: No  none               Method of communication with provider: none. Contacted pt for initial care transition follow up. Francyne Marker states she is doing okay. Reports going to dialysis M, W, F.  They are monitoring her BP, HR, swelling and weight closely. Still feeling fatigue. Denies having any abdominal pain, nausea/vomiting, weakness, chest pain/discomfort, shortness of breath. Admits to feeling dizzy on her first day but states she has been up and moving around more therefore it has since gone away. Appetite is good and starting to improve. Continues to follow fluid restriction instructed by dialysis. No changes in urination. She was having constipation while in the hospital but bowels have since straightened out. She has assistance at home if needed. Reviewed medication list.  She informed CTN that she was already on the Cinacalcet and Sevelamer (same dosages) prior to going in.   She confirmed she is

## 2023-08-15 NOTE — PROGRESS NOTES
[facility] [place] [facility]  [facility] EAR, NOSE AND THROAT  [address] [address]  Dept: [phone]  Dept Fax: [phone]  Loc: [phone]    Bradford Perrin is a 75 y.o. female who was referred by No ref. provider found for:  Chief Complaint   Patient presents with    Follow-up     Patient here for a hospital f/u. Almas Number HPI:     Bradford Perrin is a 75 y.o. female with severe renal failure and severe hyperparathyroidism who presents today for evaluation for possible parathyroidectomy. She is referred by Dr. Bill Guillermo. I spoke with her briefly when was discharged from the hospital last week to induce myself, but did not perform a consultation. Review of her chart shows a nuclear medicine scan that is very positive for parathyroid adenoma just deep to the lower portion of the right lobe of the thyroid. Her PTH level is over 400 and thought to be combination of the parathyroid adenoma and the renal failure. Dr Bill Guillermo plans to give her some medication to get her calcium down and we will treat her medically for a period of time and then let us know about when to go ahead with surgical intervention. History:      Allergies   Allergen Reactions    Clopidogrel      Other reaction(s): Rash    Dye [Iodides] Swelling and Rash     Current Outpatient Medications   Medication Sig Dispense Refill    cinacalcet (SENSIPAR) 60 MG tablet Take 1 tablet by mouth daily 30 tablet 3    sevelamer (RENVELA) 800 MG tablet Take 2 tablets by mouth 3 times daily (with meals) 90 tablet 3    levothyroxine (SYNTHROID) 100 MCG tablet Take 1 tablet by mouth Daily      B Complex-C-Folic Acid (RENAL-KIESHA) 0.8 MG TABS Take 1 tablet by mouth daily      cloNIDine (CATAPRES) 0.2 MG tablet Take 1 tablet by mouth 3 times daily      minoxidil (LONITEN) 2.5 MG tablet Take 1 tablet by mouth 2 times daily      aspirin 81 MG EC tablet Take 1 tablet by mouth daily      NIFEdipine (ADALAT CC) 60 MG

## 2023-08-19 LAB — PTH RELATED PROT SERPL-SCNC: 9.8 PMOL/L (ref 0–3.4)

## 2023-08-22 ENCOUNTER — CARE COORDINATION (OUTPATIENT)
Dept: CASE MANAGEMENT | Age: 68
End: 2023-08-22

## 2023-08-22 NOTE — CARE COORDINATION
[address] [facility] 250 Care Transitions Follow Up Call    Patient Current Location:  Home: [facility] [address] [address]    Care Transition Nurse contacted the patient by telephone to follow up after admission on 23. Verified name and  with patient as identifiers. Patient: Rufino Gordon  Patient : 1955   MRN: [id number]  Reason for Admission:  Fatigue, Abnormal labs, Hypercalcemia  Discharge Date: 23 RARS: Readmission Risk Score: 11.4      Needs to be reviewed by the provider   Additional needs identified to be addressed with provider: Yes  Pt having nausea which is caused by the [address]. States it has always made her sick and also somewhat hinders her oral intake. States providers are aware but has not been prescribed any medication for nausea. Method of communication with provider: phone. Contacted pt for care transition follow up. Eamon Jenkins states not doing that good. Reports she is waiting on call back from her doctors offices regarding her test results. Had follow up with ENT and endocrinology. Continues to feel fatigue all the time, short of breath most of the time. No acute distress. She is able to speak in complete sentences. Also continues to feel nauseated. Reports the nausea is caused by the Sensipar which has always made her sick and also \"somewhat\" hinders her oral intake. States providers are aware of this but has not been prescribed any medication for the nausea. She is open to have CTN contact her doctor. She states Dr. Gill Emanuel prescribed medication. Pt goes to dialysis [facility], W, F and should report symptoms to dialysis nurse. No other questions or needs at this time. CTN contacted Dr. Richard Mitchell office. Spoke with Elma. She states will have to call [facility] office at [phone]. CTN contacted Dr. Berna Moser office. Spoke with [facility]  who states will have to call [address] [facility] at [phone].     CTN contacted [facility]

## 2023-08-23 ENCOUNTER — TELEPHONE (OUTPATIENT)
Dept: ENT CLINIC | Age: 68
End: 2023-08-23

## 2023-08-23 NOTE — TELEPHONE ENCOUNTER
Patient's spouse, Denise Cornelius, who is on HIPAA, called in today stating they have contacted Dr Crow's office in regards to her lab results and have not gotten a response yet. Spouse wants to know if Dr Mac Bearden has spoken to Dr Toan Garcia regarding patient getting surgery. Spouse states the patient's health is getting worse every day. He states her speech is slurred, she can't understand things and she is weak and tired. He is very concerned that no one has called them to know what is going on with the treatment plan. Please advise.

## 2023-08-24 ENCOUNTER — CARE COORDINATION (OUTPATIENT)
Dept: CASE MANAGEMENT | Age: 68
End: 2023-08-24

## 2023-08-24 NOTE — CARE COORDINATION
Care Transitions Outreach Attempt-Fatigue, Abnormal labs, Hypercalcemia    Attempted to reach patient for transitions of care follow up. Unable to reach patient. Patient: Rufino Gordon Patient : 1955 MRN: [id number]    Last Discharge [address]       Date Complaint Diagnosis Description Type Department Provider    23 Fatigue; Abnormal Lab Hypercalcemia . .. ED to Hosp-Admission (Discharged) (ADMITTED) Shellie Sams MD; Mayur Sullivan DO            Noted following upcoming appointments from discharge chart review:   [facility] follow up appointment(s):   Future Appointments   Date Time Provider [address]   10/10/2023  1:30 PM Kelli Lechuga MD [facility] APEX AFL APEX END   2024 12:00 PM Kapil Mathis MD [address] [place] [address]     Attempted to contact pt for care transition follow up. Unable to reach pt. Left message with contact information and request for call back.       [facility] Nazanin, RN  Care Transition Nurse

## 2023-08-25 ENCOUNTER — CARE COORDINATION (OUTPATIENT)
Dept: CASE MANAGEMENT | Age: 68
End: 2023-08-25

## 2023-08-25 NOTE — TELEPHONE ENCOUNTER
I spoke with Dr. Gopi Fitzgerald last night about this and he said that Dr. Edwin Lantigua is working out when they gave her some meds. She also needs to have Vit D down. Dr. Edwin Lantigua said he will call when it is ok to remove parthyroid adenoma. Scanned into media.

## 2023-08-25 NOTE — CARE COORDINATION
Care Transitions Outreach Attempt-Fatigue, Abnormal labs, Hypercalcemia    Attempted to reach patient for transitions of care follow up. Unable to reach patient. Patient: Vinicio Reyes Patient : 1955 MRN: [id number]    Last Discharge [address]       Date Complaint Diagnosis Description Type Department Provider    23 Fatigue; Abnormal Lab Hypercalcemia . .. ED to Hosp-Admission (Discharged) (ADMITTED) Lauren Cao MD; Jennifer Medina DO          Noted following upcoming appointments from discharge chart review:   [address] [facility],[place] follow up appointment(s):   Future Appointments   Date Time Provider [address]   10/10/2023  1:30 PM Nany Melo MD [facility] AFL APEX END   2024 12:00 PM Gem Daly MD [address]     2nd attempt to contact pt for care transition follow up. Unable to reach pt. Call was picked up and immediately disconnected. Attempted to call back. Left message with contact information and request for call back. Episode to be resolved and CTN to sign off if return call is not received from the patient.        Gael Tomlinson, RN  Care Transition Nurse

## 2023-09-11 RX ORDER — MINOXIDIL 2.5 MG/1
5 TABLET ORAL 2 TIMES DAILY
Qty: 120 TABLET | OUTPATIENT
Start: 2023-09-11

## 2024-01-09 ENCOUNTER — TELEPHONE (OUTPATIENT)
Dept: CARDIOLOGY CLINIC | Age: 69
End: 2024-01-09

## 2024-01-09 NOTE — TELEPHONE ENCOUNTER
Tenisha is calling to RS her 01-16 appt with Arabella in [place], she was coming in for a 1 yr f/u. Please call Tenisha to get a new appt scheduled.

## 2024-01-12 ENCOUNTER — OFFICE VISIT (OUTPATIENT)
Dept: CARDIOLOGY CLINIC | Age: 69
End: 2024-01-12

## 2024-01-12 VITALS
HEIGHT: 65 IN | HEART RATE: 60 BPM | DIASTOLIC BLOOD PRESSURE: 78 MMHG | BODY MASS INDEX: 21.33 KG/M2 | WEIGHT: 128 LBS | SYSTOLIC BLOOD PRESSURE: 128 MMHG

## 2024-01-12 DIAGNOSIS — I10 PRIMARY HYPERTENSION: Primary | ICD-10-CM

## 2024-01-12 DIAGNOSIS — Z01.818 PRE-OP EVALUATION: ICD-10-CM

## 2024-01-12 NOTE — PROGRESS NOTES
[facility] [place] [facility]  [facility] CARDIOLOGY  [address]  [facility] [address]  Dept: [phone]  Dept Fax: [phone]  Loc: [phone]    Visit Date: 1/12/2024    Tenisha Solis is a 68 y.o. female who presents todayfor:  Chief Complaint   Patient presents with    Follow-up    Cardiac Clearance     Right hip Dr. Rodriguez on 1/30/24     Hypertension   Going for hip surgery   Renal failure  Dialysis  No chest pain  No changes in breathing  Limited by then hip  No changes clinically   BP is stable  Didn't have stress test and echo in 22  Issues with the chemical stress test before  Can't do treadmill   No known CAD   Does have AS       HPI:  HPI  Past Medical History:   Diagnosis Date    Anxiety     Diarrhea     Dryness of periwound skin     Gout, joint     Hernia of abdominal cavity     Hypertension     Kidney disease     Mouth dryness       Past Surgical History:   Procedure Laterality Date    AV FISTULA CREATION Left     URETER STENT PLACEMENT  11/2019     Family History   Problem Relation Age of Onset    Cancer Father     No Known Problems Sister     No Known Problems Brother      Social History     Tobacco Use    Smoking status: Never    Smokeless tobacco: Never   Substance Use Topics    Alcohol use: Not Currently      Current Outpatient Medications   Medication Sig Dispense Refill    cinacalcet (SENSIPAR) 60 MG tablet Take 1 tablet by mouth daily 30 tablet 3    sevelamer (RENVELA) 800 MG tablet Take 2 tablets by mouth 3 times daily (with meals) 90 tablet 3    levothyroxine (SYNTHROID) 100 MCG tablet Take 1 tablet by mouth Daily      B Complex-C-Folic Acid (RENAL-KIESHA) 0.8 MG TABS Take 1 tablet by mouth daily      cloNIDine (CATAPRES) 0.2 MG tablet Take 1 tablet by mouth 3 times daily      minoxidil (LONITEN) 2.5 MG tablet Take 1 tablet by mouth 2 times daily      aspirin 81 MG EC tablet Take 1 tablet by mouth daily      NIFEdipine (ADALAT CC) 60 MG extended

## 2024-01-17 ENCOUNTER — PREP FOR PROCEDURE (OUTPATIENT)
Dept: ORTHOPEDIC SURGERY | Age: 69
End: 2024-01-17

## 2024-01-17 RX ORDER — SODIUM CHLORIDE 9 MG/ML
INJECTION, SOLUTION INTRAVENOUS CONTINUOUS
Status: CANCELLED | OUTPATIENT
Start: 2024-01-17

## 2024-01-17 RX ORDER — ACETAMINOPHEN 500 MG
1000 TABLET ORAL ONCE
Status: CANCELLED | OUTPATIENT
Start: 2024-01-17 | End: 2024-01-17

## 2024-01-17 RX ORDER — TRANEXAMIC ACID 10 MG/ML
1000 INJECTION, SOLUTION INTRAVENOUS
Status: CANCELLED | OUTPATIENT
Start: 2024-01-17 | End: 2024-01-17

## 2024-01-17 RX ORDER — SODIUM CHLORIDE 0.9 % (FLUSH) 0.9 %
5-40 SYRINGE (ML) INJECTION PRN
Status: CANCELLED | OUTPATIENT
Start: 2024-01-17

## 2024-01-17 RX ORDER — SODIUM CHLORIDE 0.9 % (FLUSH) 0.9 %
5-40 SYRINGE (ML) INJECTION EVERY 12 HOURS SCHEDULED
Status: CANCELLED | OUTPATIENT
Start: 2024-01-17

## 2024-01-17 RX ORDER — SODIUM CHLORIDE 9 MG/ML
INJECTION, SOLUTION INTRAVENOUS PRN
Status: CANCELLED | OUTPATIENT
Start: 2024-01-17

## 2024-01-23 ENCOUNTER — OFFICE VISIT (OUTPATIENT)
Age: 69
End: 2024-01-23
Payer: MEDICARE

## 2024-01-23 VITALS
SYSTOLIC BLOOD PRESSURE: 126 MMHG | BODY MASS INDEX: 22.06 KG/M2 | DIASTOLIC BLOOD PRESSURE: 82 MMHG | WEIGHT: 132.4 LBS | RESPIRATION RATE: 18 BRPM | HEIGHT: 65 IN | HEART RATE: 56 BPM

## 2024-01-23 DIAGNOSIS — E03.9 ACQUIRED HYPOTHYROIDISM: ICD-10-CM

## 2024-01-23 DIAGNOSIS — E21.3 HYPERPARATHYROIDISM (HCC): Primary | ICD-10-CM

## 2024-01-23 DIAGNOSIS — E55.9 HYPOVITAMINOSIS D: ICD-10-CM

## 2024-01-23 DIAGNOSIS — E83.52 HYPERCALCEMIA: ICD-10-CM

## 2024-01-23 PROCEDURE — G8400 PT W/DXA NO RESULTS DOC: HCPCS | Performed by: INTERNAL MEDICINE

## 2024-01-23 PROCEDURE — 1123F ACP DISCUSS/DSCN MKR DOCD: CPT | Performed by: INTERNAL MEDICINE

## 2024-01-23 PROCEDURE — 1036F TOBACCO NON-USER: CPT | Performed by: INTERNAL MEDICINE

## 2024-01-23 PROCEDURE — G8427 DOCREV CUR MEDS BY ELIG CLIN: HCPCS | Performed by: INTERNAL MEDICINE

## 2024-01-23 PROCEDURE — G8420 CALC BMI NORM PARAMETERS: HCPCS | Performed by: INTERNAL MEDICINE

## 2024-01-23 PROCEDURE — 1090F PRES/ABSN URINE INCON ASSESS: CPT | Performed by: INTERNAL MEDICINE

## 2024-01-23 PROCEDURE — 99215 OFFICE O/P EST HI 40 MIN: CPT | Performed by: INTERNAL MEDICINE

## 2024-01-23 PROCEDURE — 3017F COLORECTAL CA SCREEN DOC REV: CPT | Performed by: INTERNAL MEDICINE

## 2024-01-23 PROCEDURE — 3079F DIAST BP 80-89 MM HG: CPT | Performed by: INTERNAL MEDICINE

## 2024-01-23 PROCEDURE — 3074F SYST BP LT 130 MM HG: CPT | Performed by: INTERNAL MEDICINE

## 2024-01-23 PROCEDURE — G8484 FLU IMMUNIZE NO ADMIN: HCPCS | Performed by: INTERNAL MEDICINE

## 2024-01-23 NOTE — PROGRESS NOTES
[facility] [place] [facility]  [facility] ENDOCRINOLOGY  [address]  [facility] [address]  Dept: [phone]  Loc: [phone]     Visit Date: 1/23/2024    Tenisha Solis is a 68 y.o. female who presents today for:  Chief Complaint   Patient presents with    Follow-up     Hyperparathyroidism (HCC)               Subjective:      HPI     Tenisha Solis is a 68 y.o. , female who comes for Follow up for hypercalcemia.  Axel Evans APRN  Tenisha Solis is being seen for hyperparathyroidism associated with hypercalcemia. The patient has end-stage renal disease and is on hemodialysis.  She was recently hospitalized with severe hypercalcemia and was treated with zoledronic acid.  The patient is now taking Cinacalcet 60 mg daily.  We have previously discussed surgery with this patient.  The patient was actually seen by ENT for this issue.  She has elected conservative management of her thyroidectomy.  Today we reviewed her data consisting of labs that were drawn at the dialysis center.  PTH was 509 on 1/18/2024 with calcium level of 9.5 on the same day.  Vitamin D level was 12.3 on 11/15/2023.  The patient is currently feeling better than she has in the past.  She continues to experience intermittent body aches but she is stronger.  I discussed her care with Dr. Arceo of nephrology.  Please note that this patient is also being treated for hypothyroidism.  Past Medical History:   Diagnosis Date    Anxiety     Diarrhea     Dryness of periwound skin     Gout, joint     Hernia of abdominal cavity     Hypertension     Kidney disease     Mouth dryness       Past Surgical History:   Procedure Laterality Date    AV FISTULA CREATION Left     URETER STENT PLACEMENT  11/2019       Family History   Problem Relation Age of Onset    Cancer Father     No Known Problems Sister     No Known Problems Brother      Social History     Tobacco Use    Smoking status: Never    Smokeless tobacco:

## 2024-01-23 NOTE — PROGRESS NOTES
PAT call attempted, patient unavailable, left message to please call us back at your earliest convenience; [phone]

## 2024-01-24 ENCOUNTER — TELEPHONE (OUTPATIENT)
Dept: CARDIOLOGY CLINIC | Age: 69
End: 2024-01-24

## 2024-01-24 DIAGNOSIS — I10 PRIMARY HYPERTENSION: ICD-10-CM

## 2024-01-24 DIAGNOSIS — Z01.818 PRE-OP EVALUATION: ICD-10-CM

## 2024-01-24 NOTE — TELEPHONE ENCOUNTER
Clearance needed for right hip sx with Dr Rodriguez   Testing completed at    Please advise   Form in Dr Bell box

## 2024-01-26 NOTE — H&P
mobility secondary to her  poor motion.  She is getting catching, locking and popping as well as  inguinal pain with weightbearing.  At this point, she has exhausted  conservative measures wanting to move forth with more definitive  treatment.    PHYSICAL EXAMINATION:  GENERAL:  Well-developed, well-nourished female, in no acute distress.   Walks with antalgic gait favoring right hip.  HEENT:  Normocephalic, atraumatic.  HEART:  Regular rate and rhythm.  LUNGS:  Clear to auscultation bilaterally.  ABDOMEN:  Soft and nontender.  EXTREMITIES:  Right hip shows no redness, no skin incision.  Very  painful passive range of motion.  Internal rotation is neutral.   External rotation is 15 degrees.  Forward flexion is 105 degrees,  slightly shorter on the right compared to the left.  4/5 strength  compared to contralateral.    X-RAYS:  Three views of the right hip showed degenerative joint disease,  erosion of femoral head, possible even AV osteonecrosis.    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    PLAN:  At this point, discussed with the patient about conservative  surgical option.  She would like to move forth with more definitive  treatment in the form of a right total hip replacement.  Discussed a  lateral approach secondary to destruction of her acetabulum as well as  her poor nutrition dialysis treatments, etc.  We have also discussed  postoperative protocol with hip precautions, risk, benefits, and  possible complications including but not limited to DVT; PE; fracture;  infection; bleeding nerve, artery, soft tissue; dislocation; leg length  discrepancy as well as flare-up of medical problems.  Postoperative  protocols were discussed and recovery time; and she would like to  proceed with a right total hip replacement through direct lateral  approach.        MANE GUZMÁN P.A.-C    D: 01/25/2024 15:44:30       T: 01/25/2024 17:36:30     [id number]  Job#:  [id number]     Doc#: [id number]    CC:

## 2024-01-29 ENCOUNTER — ANESTHESIA EVENT (OUTPATIENT)
Dept: OPERATING ROOM | Age: 69
End: 2024-01-29
Payer: MEDICARE

## 2024-01-30 ENCOUNTER — APPOINTMENT (OUTPATIENT)
Dept: GENERAL RADIOLOGY | Age: 69
End: 2024-01-30
Attending: ORTHOPAEDIC SURGERY
Payer: MEDICARE

## 2024-01-30 ENCOUNTER — ANESTHESIA (OUTPATIENT)
Dept: OPERATING ROOM | Age: 69
End: 2024-01-30
Payer: MEDICARE

## 2024-01-30 ENCOUNTER — HOSPITAL ENCOUNTER (INPATIENT)
Age: 69
LOS: 3 days | Discharge: HOME HEALTH | End: 2024-02-02
Attending: ORTHOPAEDIC SURGERY | Admitting: ORTHOPAEDIC SURGERY
Payer: MEDICARE

## 2024-01-30 ENCOUNTER — HOSPITAL ENCOUNTER (OUTPATIENT)
Age: 69
Setting detail: SURGERY ADMIT
End: 2024-01-30
Attending: ORTHOPAEDIC SURGERY
Payer: MEDICARE

## 2024-01-30 ENCOUNTER — HOSPITAL ENCOUNTER (OUTPATIENT)
Age: 69
Discharge: HOME | End: 2024-01-30
Attending: ORTHOPAEDIC SURGERY
Payer: MEDICARE

## 2024-01-30 DIAGNOSIS — M16.11 PRIMARY OSTEOARTHRITIS OF RIGHT HIP: Primary | ICD-10-CM

## 2024-01-30 DIAGNOSIS — E03.9 ACQUIRED HYPOTHYROIDISM: ICD-10-CM

## 2024-01-30 LAB
ABO: NORMAL
ANTIBODY SCREEN: NORMAL
RH FACTOR: NORMAL

## 2024-01-30 PROCEDURE — 6360000002 HC RX W HCPCS: Performed by: ORTHOPAEDIC SURGERY

## 2024-01-30 PROCEDURE — 2580000003 HC RX 258: Performed by: ORTHOPAEDIC SURGERY

## 2024-01-30 PROCEDURE — 6360000002 HC RX W HCPCS: Performed by: PHYSICIAN ASSISTANT

## 2024-01-30 PROCEDURE — 0SR902A REPLACEMENT OF RIGHT HIP JOINT WITH METAL ON POLYETHYLENE SYNTHETIC SUBSTITUTE, UNCEMENTED, OPEN APPROACH: ICD-10-PCS | Performed by: ORTHOPAEDIC SURGERY

## 2024-01-30 PROCEDURE — 6370000000 HC RX 637 (ALT 250 FOR IP): Performed by: ORTHOPAEDIC SURGERY

## 2024-01-30 PROCEDURE — 73502 X-RAY EXAM HIP UNI 2-3 VIEWS: CPT

## 2024-01-30 PROCEDURE — 99222 1ST HOSP IP/OBS MODERATE 55: CPT | Performed by: INTERNAL MEDICINE

## 2024-01-30 PROCEDURE — 3600000004 HC SURGERY LEVEL 4 BASE: Performed by: ORTHOPAEDIC SURGERY

## 2024-01-30 PROCEDURE — 3700000001 HC ADD 15 MINUTES (ANESTHESIA): Performed by: ORTHOPAEDIC SURGERY

## 2024-01-30 PROCEDURE — 86900 BLOOD TYPING SEROLOGIC ABO: CPT

## 2024-01-30 PROCEDURE — 6360000002 HC RX W HCPCS: Performed by: NURSE ANESTHETIST, CERTIFIED REGISTERED

## 2024-01-30 PROCEDURE — 1200000000 HC SEMI PRIVATE

## 2024-01-30 PROCEDURE — 2500000003 HC RX 250 WO HCPCS: Performed by: ORTHOPAEDIC SURGERY

## 2024-01-30 PROCEDURE — 6360000002 HC RX W HCPCS: Performed by: STUDENT IN AN ORGANIZED HEALTH CARE EDUCATION/TRAINING PROGRAM

## 2024-01-30 PROCEDURE — 7100000000 HC PACU RECOVERY - FIRST 15 MIN: Performed by: ORTHOPAEDIC SURGERY

## 2024-01-30 PROCEDURE — 86850 RBC ANTIBODY SCREEN: CPT

## 2024-01-30 PROCEDURE — 7100000010 HC PHASE II RECOVERY - FIRST 15 MIN: Performed by: ORTHOPAEDIC SURGERY

## 2024-01-30 PROCEDURE — 2500000003 HC RX 250 WO HCPCS: Performed by: NURSE ANESTHETIST, CERTIFIED REGISTERED

## 2024-01-30 PROCEDURE — 7100000011 HC PHASE II RECOVERY - ADDTL 15 MIN: Performed by: ORTHOPAEDIC SURGERY

## 2024-01-30 PROCEDURE — 64447 NJX AA&/STRD FEMORAL NRV IMG: CPT | Performed by: STUDENT IN AN ORGANIZED HEALTH CARE EDUCATION/TRAINING PROGRAM

## 2024-01-30 PROCEDURE — 36415 COLL VENOUS BLD VENIPUNCTURE: CPT

## 2024-01-30 PROCEDURE — 2709999900 HC NON-CHARGEABLE SUPPLY: Performed by: ORTHOPAEDIC SURGERY

## 2024-01-30 PROCEDURE — C1776 JOINT DEVICE (IMPLANTABLE): HCPCS | Performed by: ORTHOPAEDIC SURGERY

## 2024-01-30 PROCEDURE — 3600000014 HC SURGERY LEVEL 4 ADDTL 15MIN: Performed by: ORTHOPAEDIC SURGERY

## 2024-01-30 PROCEDURE — 7100000001 HC PACU RECOVERY - ADDTL 15 MIN: Performed by: ORTHOPAEDIC SURGERY

## 2024-01-30 PROCEDURE — 86901 BLOOD TYPING SEROLOGIC RH(D): CPT

## 2024-01-30 PROCEDURE — 3700000000 HC ANESTHESIA ATTENDED CARE: Performed by: ORTHOPAEDIC SURGERY

## 2024-01-30 PROCEDURE — 2720000010 HC SURG SUPPLY STERILE: Performed by: ORTHOPAEDIC SURGERY

## 2024-01-30 DEVICE — HIP H1 TOT STD COCR HD IMPL CAPPED H1 SN: Type: IMPLANTABLE DEVICE | Site: HIP | Status: FUNCTIONAL

## 2024-01-30 DEVICE — REFLECTION SPHERICAL HEAD SCREW 20MM
Type: IMPLANTABLE DEVICE | Site: HIP | Status: FUNCTIONAL
Brand: REFLECTION

## 2024-01-30 DEVICE — SYNERGY POROUS FEMORAL COMPONENT SZ 14
Type: IMPLANTABLE DEVICE | Site: HIP | Status: FUNCTIONAL
Brand: SYNERGY

## 2024-01-30 DEVICE — R3 20 DEGREE XLPE ACETABULAR LINER                                    32MM INNER DIAMETER X OUTER DIAMETER 50MM
Type: IMPLANTABLE DEVICE | Site: HIP | Status: FUNCTIONAL
Brand: R3

## 2024-01-30 DEVICE — R3 3 HOLE ACETABULAR SHELL 50MM
Type: IMPLANTABLE DEVICE | Site: HIP | Status: FUNCTIONAL
Brand: R3 ACETABULAR

## 2024-01-30 DEVICE — REFLECTION SPHERICAL HEAD SCREW 30MM
Type: IMPLANTABLE DEVICE | Site: HIP | Status: FUNCTIONAL
Brand: REFLECTION

## 2024-01-30 DEVICE — REFLECTION THREADED HOLE COVER
Type: IMPLANTABLE DEVICE | Site: HIP | Status: FUNCTIONAL
Brand: REFLECTION

## 2024-01-30 DEVICE — COBALT CHROME 12/14 TAPER FEMORAL                                    HEAD 32MM + 0: Type: IMPLANTABLE DEVICE | Site: HIP | Status: FUNCTIONAL

## 2024-01-30 RX ORDER — CLONIDINE HYDROCHLORIDE 0.2 MG/1
0.2 TABLET ORAL 3 TIMES DAILY
Status: DISCONTINUED | OUTPATIENT
Start: 2024-01-30 | End: 2024-02-02 | Stop reason: HOSPADM

## 2024-01-30 RX ORDER — HYDROCODONE BITARTRATE AND ACETAMINOPHEN 5; 325 MG/1; MG/1
1 TABLET ORAL EVERY 4 HOURS PRN
Status: DISCONTINUED | OUTPATIENT
Start: 2024-01-30 | End: 2024-02-01

## 2024-01-30 RX ORDER — ONDANSETRON 2 MG/ML
4 INJECTION INTRAMUSCULAR; INTRAVENOUS
Status: DISCONTINUED | OUTPATIENT
Start: 2024-01-30 | End: 2024-01-30 | Stop reason: HOSPADM

## 2024-01-30 RX ORDER — ASPIRIN 325 MG
325 TABLET, DELAYED RELEASE (ENTERIC COATED) ORAL 2 TIMES DAILY
Status: DISCONTINUED | OUTPATIENT
Start: 2024-01-30 | End: 2024-02-02 | Stop reason: HOSPADM

## 2024-01-30 RX ORDER — PROPOFOL 10 MG/ML
INJECTION, EMULSION INTRAVENOUS PRN
Status: DISCONTINUED | OUTPATIENT
Start: 2024-01-30 | End: 2024-01-30 | Stop reason: SDUPTHER

## 2024-01-30 RX ORDER — NIFEDIPINE 60 MG/1
60 TABLET, EXTENDED RELEASE ORAL 2 TIMES DAILY
Status: DISCONTINUED | OUTPATIENT
Start: 2024-01-30 | End: 2024-02-02 | Stop reason: HOSPADM

## 2024-01-30 RX ORDER — SODIUM CHLORIDE 9 MG/ML
INJECTION, SOLUTION INTRAVENOUS PRN
Status: DISCONTINUED | OUTPATIENT
Start: 2024-01-30 | End: 2024-01-30 | Stop reason: HOSPADM

## 2024-01-30 RX ORDER — TRANEXAMIC ACID 100 MG/ML
INJECTION, SOLUTION INTRAVENOUS PRN
Status: DISCONTINUED | OUTPATIENT
Start: 2024-01-30 | End: 2024-01-30 | Stop reason: ALTCHOICE

## 2024-01-30 RX ORDER — SODIUM CHLORIDE 9 MG/ML
INJECTION, SOLUTION INTRAVENOUS PRN
Status: DISCONTINUED | OUTPATIENT
Start: 2024-01-30 | End: 2024-02-02 | Stop reason: HOSPADM

## 2024-01-30 RX ORDER — HYDROXYZINE PAMOATE 25 MG/1
25 CAPSULE ORAL 3 TIMES DAILY PRN
Status: DISCONTINUED | OUTPATIENT
Start: 2024-01-30 | End: 2024-02-02 | Stop reason: HOSPADM

## 2024-01-30 RX ORDER — DIPHENHYDRAMINE HYDROCHLORIDE 50 MG/ML
12.5 INJECTION INTRAMUSCULAR; INTRAVENOUS
Status: DISCONTINUED | OUTPATIENT
Start: 2024-01-30 | End: 2024-01-30 | Stop reason: HOSPADM

## 2024-01-30 RX ORDER — ENEMA 19; 7 G/133ML; G/133ML
1 ENEMA RECTAL DAILY PRN
Status: DISCONTINUED | OUTPATIENT
Start: 2024-01-30 | End: 2024-02-02 | Stop reason: HOSPADM

## 2024-01-30 RX ORDER — SODIUM CHLORIDE 9 MG/ML
INJECTION, SOLUTION INTRAVENOUS CONTINUOUS
Status: DISCONTINUED | OUTPATIENT
Start: 2024-01-30 | End: 2024-01-30

## 2024-01-30 RX ORDER — BUPIVACAINE HYDROCHLORIDE 5 MG/ML
INJECTION, SOLUTION EPIDURAL; INTRACAUDAL
Status: COMPLETED | OUTPATIENT
Start: 2024-01-30 | End: 2024-01-30

## 2024-01-30 RX ORDER — ACETAMINOPHEN 500 MG
1000 TABLET ORAL ONCE
Status: COMPLETED | OUTPATIENT
Start: 2024-01-30 | End: 2024-01-30

## 2024-01-30 RX ORDER — PROCHLORPERAZINE EDISYLATE 5 MG/ML
10 INJECTION INTRAMUSCULAR; INTRAVENOUS EVERY 6 HOURS PRN
Status: DISCONTINUED | OUTPATIENT
Start: 2024-01-30 | End: 2024-02-02 | Stop reason: HOSPADM

## 2024-01-30 RX ORDER — LIDOCAINE HCL/PF 100 MG/5ML
SYRINGE (ML) INJECTION PRN
Status: DISCONTINUED | OUTPATIENT
Start: 2024-01-30 | End: 2024-01-30 | Stop reason: SDUPTHER

## 2024-01-30 RX ORDER — ACETAMINOPHEN 325 MG/1
650 TABLET ORAL EVERY 6 HOURS
Status: DISCONTINUED | OUTPATIENT
Start: 2024-01-30 | End: 2024-02-02 | Stop reason: HOSPADM

## 2024-01-30 RX ORDER — CINACALCET 30 MG/1
30 TABLET, FILM COATED ORAL 2 TIMES DAILY
Status: DISCONTINUED | OUTPATIENT
Start: 2024-01-30 | End: 2024-02-02 | Stop reason: HOSPADM

## 2024-01-30 RX ORDER — SODIUM CHLORIDE 9 MG/ML
INJECTION, SOLUTION INTRAVENOUS CONTINUOUS
Status: DISCONTINUED | OUTPATIENT
Start: 2024-01-30 | End: 2024-01-30 | Stop reason: HOSPADM

## 2024-01-30 RX ORDER — SODIUM CHLORIDE 0.9 % (FLUSH) 0.9 %
5-40 SYRINGE (ML) INJECTION EVERY 12 HOURS SCHEDULED
Status: DISCONTINUED | OUTPATIENT
Start: 2024-01-30 | End: 2024-02-02 | Stop reason: HOSPADM

## 2024-01-30 RX ORDER — FENTANYL CITRATE 50 UG/ML
50 INJECTION, SOLUTION INTRAMUSCULAR; INTRAVENOUS EVERY 5 MIN PRN
Status: DISCONTINUED | OUTPATIENT
Start: 2024-01-30 | End: 2024-01-30 | Stop reason: HOSPADM

## 2024-01-30 RX ORDER — MORPHINE SULFATE 2 MG/ML
2 INJECTION, SOLUTION INTRAMUSCULAR; INTRAVENOUS
Status: DISCONTINUED | OUTPATIENT
Start: 2024-01-30 | End: 2024-01-30

## 2024-01-30 RX ORDER — SODIUM CHLORIDE 0.9 % (FLUSH) 0.9 %
5-40 SYRINGE (ML) INJECTION EVERY 12 HOURS SCHEDULED
Status: DISCONTINUED | OUTPATIENT
Start: 2024-01-30 | End: 2024-01-30 | Stop reason: HOSPADM

## 2024-01-30 RX ORDER — TRAMADOL HYDROCHLORIDE 50 MG/1
50 TABLET ORAL EVERY 4 HOURS PRN
Status: DISCONTINUED | OUTPATIENT
Start: 2024-01-30 | End: 2024-02-02 | Stop reason: HOSPADM

## 2024-01-30 RX ORDER — SODIUM CHLORIDE 0.9 % (FLUSH) 0.9 %
5-40 SYRINGE (ML) INJECTION PRN
Status: DISCONTINUED | OUTPATIENT
Start: 2024-01-30 | End: 2024-01-30 | Stop reason: HOSPADM

## 2024-01-30 RX ORDER — VANCOMYCIN HYDROCHLORIDE 1 G/20ML
INJECTION, POWDER, LYOPHILIZED, FOR SOLUTION INTRAVENOUS PRN
Status: DISCONTINUED | OUTPATIENT
Start: 2024-01-30 | End: 2024-01-30 | Stop reason: ALTCHOICE

## 2024-01-30 RX ORDER — LEVOTHYROXINE SODIUM 0.1 MG/1
100 TABLET ORAL DAILY
Status: DISCONTINUED | OUTPATIENT
Start: 2024-01-30 | End: 2024-02-02 | Stop reason: HOSPADM

## 2024-01-30 RX ORDER — ROPIVACAINE HYDROCHLORIDE 5 MG/ML
INJECTION, SOLUTION EPIDURAL; INFILTRATION; PERINEURAL
Status: COMPLETED | OUTPATIENT
Start: 2024-01-30 | End: 2024-01-30

## 2024-01-30 RX ORDER — ONDANSETRON 2 MG/ML
INJECTION INTRAMUSCULAR; INTRAVENOUS PRN
Status: DISCONTINUED | OUTPATIENT
Start: 2024-01-30 | End: 2024-01-30 | Stop reason: SDUPTHER

## 2024-01-30 RX ORDER — MINOXIDIL 2.5 MG/1
2.5 TABLET ORAL 2 TIMES DAILY
Status: DISCONTINUED | OUTPATIENT
Start: 2024-01-30 | End: 2024-02-02 | Stop reason: HOSPADM

## 2024-01-30 RX ORDER — SEVELAMER CARBONATE 800 MG/1
1600 TABLET, FILM COATED ORAL
Status: DISCONTINUED | OUTPATIENT
Start: 2024-01-30 | End: 2024-01-30

## 2024-01-30 RX ORDER — SODIUM CHLORIDE 0.9 % (FLUSH) 0.9 %
5-40 SYRINGE (ML) INJECTION PRN
Status: DISCONTINUED | OUTPATIENT
Start: 2024-01-30 | End: 2024-02-02 | Stop reason: HOSPADM

## 2024-01-30 RX ORDER — TEMAZEPAM 15 MG/1
30 CAPSULE ORAL NIGHTLY PRN
Status: DISCONTINUED | OUTPATIENT
Start: 2024-01-30 | End: 2024-02-02 | Stop reason: HOSPADM

## 2024-01-30 RX ORDER — DEXAMETHASONE SODIUM PHOSPHATE 10 MG/ML
INJECTION, EMULSION INTRAMUSCULAR; INTRAVENOUS PRN
Status: DISCONTINUED | OUTPATIENT
Start: 2024-01-30 | End: 2024-01-30 | Stop reason: SDUPTHER

## 2024-01-30 RX ORDER — POLYETHYLENE GLYCOL 3350 17 G/17G
17 POWDER, FOR SOLUTION ORAL DAILY
Status: DISCONTINUED | OUTPATIENT
Start: 2024-01-30 | End: 2024-02-02 | Stop reason: HOSPADM

## 2024-01-30 RX ORDER — CYCLOBENZAPRINE HCL 10 MG
10 TABLET ORAL 3 TIMES DAILY PRN
Status: DISCONTINUED | OUTPATIENT
Start: 2024-01-30 | End: 2024-02-02 | Stop reason: HOSPADM

## 2024-01-30 RX ORDER — EPHEDRINE SULFATE/0.9% NACL/PF 50 MG/5 ML
SYRINGE (ML) INTRAVENOUS PRN
Status: DISCONTINUED | OUTPATIENT
Start: 2024-01-30 | End: 2024-01-30 | Stop reason: SDUPTHER

## 2024-01-30 RX ORDER — BUPIVACAINE HYDROCHLORIDE 7.5 MG/ML
INJECTION, SOLUTION INTRASPINAL
Status: DISCONTINUED | OUTPATIENT
Start: 2024-01-30 | End: 2024-01-30

## 2024-01-30 RX ADMIN — HYDROMORPHONE HYDROCHLORIDE 0.5 MG: 1 INJECTION, SOLUTION INTRAMUSCULAR; INTRAVENOUS; SUBCUTANEOUS at 18:18

## 2024-01-30 RX ADMIN — PROPOFOL 50 MG: 10 INJECTION, EMULSION INTRAVENOUS at 08:25

## 2024-01-30 RX ADMIN — Medication 20 MG: at 08:05

## 2024-01-30 RX ADMIN — ACETAMINOPHEN 650 MG: 325 TABLET ORAL at 17:46

## 2024-01-30 RX ADMIN — ACETAMINOPHEN 1000 MG: 500 TABLET ORAL at 06:40

## 2024-01-30 RX ADMIN — Medication 20 MG: at 08:13

## 2024-01-30 RX ADMIN — CLONIDINE HYDROCHLORIDE 0.2 MG: 0.2 TABLET ORAL at 22:44

## 2024-01-30 RX ADMIN — HYDROCODONE BITARTRATE AND ACETAMINOPHEN 1 TABLET: 5; 325 TABLET ORAL at 17:01

## 2024-01-30 RX ADMIN — HYDROCODONE BITARTRATE AND ACETAMINOPHEN 1 TABLET: 5; 325 TABLET ORAL at 22:43

## 2024-01-30 RX ADMIN — CEFAZOLIN 2000 MG: 10 INJECTION, POWDER, FOR SOLUTION INTRAVENOUS at 22:47

## 2024-01-30 RX ADMIN — CYCLOBENZAPRINE 10 MG: 10 TABLET, FILM COATED ORAL at 13:15

## 2024-01-30 RX ADMIN — HYDROMORPHONE HYDROCHLORIDE 0.5 MG: 1 INJECTION, SOLUTION INTRAMUSCULAR; INTRAVENOUS; SUBCUTANEOUS at 21:16

## 2024-01-30 RX ADMIN — PROCHLORPERAZINE EDISYLATE 10 MG: 5 INJECTION INTRAMUSCULAR; INTRAVENOUS at 22:20

## 2024-01-30 RX ADMIN — MINOXIDIL 2.5 MG: 2.5 TABLET ORAL at 22:44

## 2024-01-30 RX ADMIN — LEVOTHYROXINE SODIUM 100 MCG: 0.1 TABLET ORAL at 13:14

## 2024-01-30 RX ADMIN — TRAMADOL HYDROCHLORIDE 50 MG: 50 TABLET, COATED ORAL at 14:43

## 2024-01-30 RX ADMIN — ASPIRIN 325 MG: 325 TABLET, COATED ORAL at 21:33

## 2024-01-30 RX ADMIN — TEMAZEPAM 30 MG: 15 CAPSULE ORAL at 22:57

## 2024-01-30 RX ADMIN — SODIUM CHLORIDE: 9 INJECTION, SOLUTION INTRAVENOUS at 11:24

## 2024-01-30 RX ADMIN — Medication 10 MG: at 08:20

## 2024-01-30 RX ADMIN — HYDROMORPHONE HYDROCHLORIDE 0.5 MG: 1 INJECTION, SOLUTION INTRAMUSCULAR; INTRAVENOUS; SUBCUTANEOUS at 15:22

## 2024-01-30 RX ADMIN — ACETAMINOPHEN 650 MG: 325 TABLET ORAL at 13:14

## 2024-01-30 RX ADMIN — HYDROXYZINE PAMOATE 25 MG: 25 CAPSULE ORAL at 10:54

## 2024-01-30 RX ADMIN — PROPOFOL 50 MG: 10 INJECTION, EMULSION INTRAVENOUS at 07:20

## 2024-01-30 RX ADMIN — SODIUM CHLORIDE, PRESERVATIVE FREE 10 ML: 5 INJECTION INTRAVENOUS at 21:17

## 2024-01-30 RX ADMIN — SODIUM CHLORIDE: 9 INJECTION, SOLUTION INTRAVENOUS at 06:34

## 2024-01-30 RX ADMIN — ROPIVACAINE HYDROCHLORIDE 20 ML: 5 INJECTION, SOLUTION EPIDURAL; INFILTRATION; PERINEURAL at 07:25

## 2024-01-30 RX ADMIN — HYDROCODONE BITARTRATE AND ACETAMINOPHEN 1 TABLET: 5; 325 TABLET ORAL at 10:54

## 2024-01-30 RX ADMIN — ASPIRIN 325 MG: 325 TABLET, COATED ORAL at 13:15

## 2024-01-30 RX ADMIN — PROPOFOL 100 MCG/KG/MIN: 10 INJECTION, EMULSION INTRAVENOUS at 07:25

## 2024-01-30 RX ADMIN — POLYETHYLENE GLYCOL 3350 17 G: 17 POWDER, FOR SOLUTION ORAL at 13:17

## 2024-01-30 RX ADMIN — SODIUM CHLORIDE: 9 INJECTION, SOLUTION INTRAVENOUS at 07:15

## 2024-01-30 RX ADMIN — CEFAZOLIN 2000 MG: 10 INJECTION, POWDER, FOR SOLUTION INTRAVENOUS at 14:47

## 2024-01-30 RX ADMIN — Medication 2000 MG: at 07:30

## 2024-01-30 RX ADMIN — DEXAMETHASONE SODIUM PHOSPHATE 10 MG: 10 INJECTION, EMULSION INTRAMUSCULAR; INTRAVENOUS at 07:30

## 2024-01-30 RX ADMIN — CYCLOBENZAPRINE 10 MG: 10 TABLET, FILM COATED ORAL at 21:33

## 2024-01-30 RX ADMIN — MORPHINE SULFATE 2 MG: 2 INJECTION, SOLUTION INTRAMUSCULAR; INTRAVENOUS at 13:15

## 2024-01-30 RX ADMIN — PROPOFOL 50 MG: 10 INJECTION, EMULSION INTRAVENOUS at 07:35

## 2024-01-30 RX ADMIN — PROCHLORPERAZINE EDISYLATE 10 MG: 5 INJECTION INTRAMUSCULAR; INTRAVENOUS at 16:23

## 2024-01-30 RX ADMIN — NIFEDIPINE 60 MG: 60 TABLET, EXTENDED RELEASE ORAL at 22:44

## 2024-01-30 RX ADMIN — BUPIVACAINE HYDROCHLORIDE 15 MG: 5 INJECTION, SOLUTION EPIDURAL; INTRACAUDAL at 07:22

## 2024-01-30 RX ADMIN — ONDANSETRON 8 MG: 2 INJECTION INTRAMUSCULAR; INTRAVENOUS at 07:15

## 2024-01-30 RX ADMIN — TRANEXAMIC ACID 1000 MG: 100 INJECTION, SOLUTION INTRAVENOUS at 07:30

## 2024-01-30 RX ADMIN — CINACALCET HYDROCHLORIDE 30 MG: 30 TABLET, FILM COATED ORAL at 22:43

## 2024-01-30 RX ADMIN — CLONIDINE HYDROCHLORIDE 0.2 MG: 0.2 TABLET ORAL at 13:15

## 2024-01-30 RX ADMIN — Medication 100 MG: at 07:30

## 2024-01-30 ASSESSMENT — PAIN SCALES - GENERAL
PAINLEVEL_OUTOF10: 4
PAINLEVEL_OUTOF10: 8
PAINLEVEL_OUTOF10: 9
PAINLEVEL_OUTOF10: 2
PAINLEVEL_OUTOF10: 8
PAINLEVEL_OUTOF10: 10

## 2024-01-30 ASSESSMENT — PAIN DESCRIPTION - DESCRIPTORS: DESCRIPTORS: ACHING

## 2024-01-30 ASSESSMENT — PAIN - FUNCTIONAL ASSESSMENT
PAIN_FUNCTIONAL_ASSESSMENT: 0-10
PAIN_FUNCTIONAL_ASSESSMENT: NONE - DENIES PAIN
PAIN_FUNCTIONAL_ASSESSMENT: 0-10
PAIN_FUNCTIONAL_ASSESSMENT: PREVENTS OR INTERFERES SOME ACTIVE ACTIVITIES AND ADLS

## 2024-01-30 NOTE — PROGRESS NOTES
Patient return to [facility] room 16. VS stable. Pain level assessed. Updated patient and family on discharge planning. Patient has snack and drink. No further questions or concerns voiced at this time. Side rails up times 2. Call light in reach.

## 2024-01-30 NOTE — PROGRESS NOTES
Pt returned to [facility] room 16. Vitals and assessment as charted. 0.9 infusing, @300ml to count from PACU. Pt has crackers and water. Family at the bedside. Pt and family verbalized understanding of discharge criteria and call light use. Call light in reach.

## 2024-01-30 NOTE — ANESTHESIA PRE PROCEDURE
injection 5-40 mL  5-40 mL IntraVENous PRN Tono Rodriguez MD       • 0.9 % sodium chloride infusion   IntraVENous PRN Tono Rodriguez MD       • ceFAZolin (ANCEF) 2000 mg in 0.9% sodium chloride 50 mL IVPB  2,000 mg IntraVENous On Call to OR Tono Rodriguez MD       • tranexamic acid (CYKLOKAPRON) 1,000 mg in sodium chloride 0.9 % 100 mL IVPB ([id number])  1,000 mg IntraVENous On Call to OR Tono oRdriguez MD           Allergies:    Allergies   Allergen Reactions   • Clopidogrel      Other reaction(s): Rash   • Dye [Iodides] Swelling and Rash       Problem List:    Patient Active Problem List   Diagnosis Code   • Malignant neoplasm of cervix (HCC) C53.9   • Encounter for chemotherapy management Z51.11   • Hypertension I10   • Chronic anemia D64.9   • Chronic renal insufficiency, stage 4 (severe) ([facility]) N18.4   • Hypercalcemia E83.52   • Hypovitaminosis D E55.9   • Abnormal thyroid function test R94.6   • Parathyroid adenoma D35.1   • ESRD (end stage renal disease) on dialysis ([facility]) N18.6, Z99.2   • Hyperparathyroidism ([facility]) E21.3       Past Medical History:        Diagnosis Date   • Anxiety    • Diarrhea    • Dryness of periwound skin    • Gout, joint    • Hemodialysis patient ([facility])    • Hernia of abdominal cavity    • Hypertension    • Kidney disease    • Mouth dryness        Past Surgical History:        Procedure Laterality Date   • AV FISTULA CREATION Left    • URETER STENT PLACEMENT  11/2019       Social History:    Social History     Tobacco Use   • Smoking status: Never   • Smokeless tobacco: Never   Substance Use Topics   • Alcohol use: Not Currently                                Counseling given: Not Answered      Vital Signs (Current):   Vitals:    01/30/24 0551 01/30/24 0612   BP: (!) 140/69    Pulse: 70    Resp: 16    Temp: 98.8 °F (37.1 °C)    TempSrc: Temporal    SpO2: 96%    Weight:  58.8 kg (129 lb 9.6 oz)   Height:  1.651 m (5' 5\")                                              BP

## 2024-01-30 NOTE — H&P
[facility]  History and Physical Update    Pt Name: Tenisha Solis  MRN: [id number]  YOB: 1955  Date of evaluation: 1/30/2024    I have examined the patient and reviewed the H&P/Consult and there are no changes to the patient or plans.      Tono Rodriguez MD  Electronically signed 1/30/2024 at 7:17 AM

## 2024-01-30 NOTE — PROGRESS NOTES
0845: Pt arrives to pacu, awakens to verbal stimuli. Pt on room air, respirations easy and unlabored. Pt unable to move legs at this time due to spinal/block. VSS  0858: Medical imaging at bedside   0910: Provided ice chips, tolerated well  0915: Pt resting off and on with eyes closed, easily awakens to voice. Continues to deny pain  0920: Transported pt back to [facility] in stable condition. VSS

## 2024-01-30 NOTE — ANESTHESIA PROCEDURE NOTES
Peripheral Block    Patient location during procedure: OR  Reason for block: post-op pain management  Start time: 1/30/2024 7:09 AM  End time: 1/30/2024 7:12 AM  Staffing  Performed: anesthesiologist   Anesthesiologist: John Jordan DO  Performed by: John Jordan DO  Authorized by: John Jordan DO    Preanesthetic Checklist  Completed: patient identified, IV checked, site marked, risks and benefits discussed, surgical/procedural consents, equipment checked, pre-op evaluation, timeout performed, anesthesia consent given, oxygen available, monitors applied/VS acknowledged, fire risk safety assessment completed and verbalized and blood product R/B/A discussed and consented  Peripheral Block   Patient position: supine  Prep: ChloraPrep  Provider prep: mask and sterile gloves  Patient monitoring: cardiac monitor, continuous pulse ox, continuous capnometry, frequent blood pressure checks, IV access and responsive to questions  Block type: PENG  Laterality: right  Injection technique: single-shot  Guidance: ultrasound guided    Needle   Needle type: insulated echogenic nerve stimulator needle   Needle gauge: 20 G  Needle localization: ultrasound guidance  Needle length: 10 cm  Assessment   Injection assessment: negative aspiration for heme, no paresthesia on injection, local visualized surrounding nerve on ultrasound and no intravascular symptoms  Paresthesia pain: none  Slow fractionated injection: yes  Hemodynamics: stableno  Outcomes: uncomplicated and patient tolerated procedure well    Medications Administered  ropivacaine (NAROPIN) injection 0.5% - Perineural   20 mL - 1/30/2024 7:25:00 AM

## 2024-01-30 NOTE — ANESTHESIA POSTPROCEDURE EVALUATION
Department of Anesthesiology  Postprocedure Note    Patient: Tenisha Solis  MRN: [id number]  YOB: 1955  Date of evaluation: 1/30/2024    Procedure Summary     Date: 01/30/24 Room / Location: [facility] OR  / [facility] OR    Anesthesia Start: 0713 Anesthesia Stop: 0847    Procedure: Right Total Hip Lateral Approach (Right: Hip) Diagnosis:       Osteoarthritis of right hip, unspecified osteoarthritis type      (Osteoarthritis of right hip, unspecified osteoarthritis type [M16.11])    Surgeons: oTno Rodriguez MD Responsible Provider: John Jordan DO    Anesthesia Type: spinal, regional ASA Status: 3          Anesthesia Type: No value filed.    Daniel Phase I: Daniel Score: 10    Daniel Phase II: Daniel Score: 10    Anesthesia Post Evaluation    Patient location during evaluation: PACU  Patient participation: complete - patient participated  Level of consciousness: awake and alert  Airway patency: patent  Nausea & Vomiting: no vomiting and no nausea  Cardiovascular status: hemodynamically stable  Respiratory status: acceptable  Hydration status: stable  Pain management: adequate        No notable events documented.

## 2024-01-30 NOTE — CONSULTS
[facility]    [address] [place], [place] [id number]  [phone]     Hospital Consult  1/30/2024 3:15 PM    Pt Name:    Tenisha Solis  MRN:     [id number]   [id number]  YOB: 1955  Admit Date:    1/30/2024  5:21 AM  Primary Care Physician:  Axel Evans APRN    CSN Number:   [id number]    Reason for Consult:  ESRD on hemodialysis  Requesting provider:  Dr. Rodriguez    History:   The patient is a 68 y.o. female with history of ESRD on hemodialysis Mondays, Wednesdays and Fridays, secondary hyperparathyroidism, degenerative joint disease who was brought in for elective total right hip replacement after having failed conservative measures.  Patient underwent right hip replacement uneventfully this morning.  Nephrology's been consulted for management of ESRD.  She had dialysis treatment yesterday as outpatient.  Currently on room air.  Awake.  Reports some postop hip pain, no alleviating or aggravating factors.  No acute distress.  Family member at the bedside.  Denies any shortness of breath.    Past Medical History:  Past Medical History:   Diagnosis Date    Anxiety     Diarrhea     Dryness of periwound skin     Gout, joint     Hemodialysis patient (HCC)     Hernia of abdominal cavity     Hypertension     Kidney disease     Mouth dryness        Past Surgical History:  Past Surgical History:   Procedure Laterality Date    AV FISTULA CREATION Left     URETER STENT PLACEMENT  11/2019       Family History:  Family History   Problem Relation Age of Onset    Cancer Father     No Known Problems Sister     No Known Problems Brother        Social History:  Social History     Socioeconomic History    Marital status:      Spouse name: Not on file    Number of children: Not on file    Years of education: Not on file    Highest education level: Not on file   Occupational History    Not on file   Tobacco Use    Smoking status: Never    Smokeless tobacco: Never   Substance and  nose or epistaxis  Respiratory: Negative for shortness of breath.  Negative for cough or sputum production. Negative for hemoptysis  Cardiovascular: Negative for chest pain  GI: Negative for nausea, vomiting and diarrhea.  Negative for hematochezia and melena  Skin: Negative for rash  Musculoskeletal: Positive for right hip pain    All other review of systems were reviewed and negative    Current Meds:  Infusion:    sodium chloride       Meds:    cinacalcet  30 mg Oral BID    cloNIDine  0.2 mg Oral TID    levothyroxine  100 mcg Oral Daily    minoxidil  2.5 mg Oral BID    NIFEdipine  60 mg Oral BID    sodium chloride flush  5-40 mL IntraVENous 2 times per day    ceFAZolin (ANCEF) IVPB  2,000 mg IntraVENous Q8H    acetaminophen  650 mg Oral Q6H    polyethylene glycol  17 g Oral Daily    aspirin  325 mg Oral BID     Meds prn: temazepam, sodium chloride flush, sodium chloride, traMADol, cyclobenzaprine, hydrOXYzine pamoate, HYDROcodone 5 mg - acetaminophen, magnesium hydroxide, sodium phosphate, prochlorperazine, HYDROmorphone     Allergies/Intolerances:  ALLERGIES: Clopidogrel and Dye [iodides]    24HR INTAKE/OUTPUT:    Intake/Output Summary (Last 24 hours) at 1/30/2024 1515  Last data filed at 1/30/2024 0845  Gross per 24 hour   Intake 710 ml   Output 300 ml   Net 410 ml     No intake/output data recorded.  I/O this shift:  In: 710 [I.V.:700; IV Piggyback:10]  Out: 300 [Blood:300]  Admission weight: 58.8 kg (129 lb 9.6 oz)  Wt Readings from Last 3 Encounters:   01/30/24 58.8 kg (129 lb 9.6 oz)   01/23/24 60.1 kg (132 lb 6.4 oz)   01/12/24 58.1 kg (128 lb)     Body mass index is 21.57 kg/m².    Physical Examination:  VITALS:   Vitals:    01/30/24 1126 01/30/24 1245 01/30/24 1259 01/30/24 1345   BP: (!) 149/60 (!) 142/67 (!) 147/72    Pulse: 74 67 75    Resp: 16  16 16   Temp:   97.3 °F (36.3 °C)    TempSrc:   Oral    SpO2: 98%      Weight:       Height:         Weight:   Wt Readings from Last 3 Encounters:   01/30/24

## 2024-01-30 NOTE — PROGRESS NOTES
Patient oriented to Same Day department and admitted to Same Day Surgery room 16.   Patient verbalized approval for first name, last initial with physician name on unit whiteboard.     Plan of care reviewed with patient.   Patient room whiteboard filled out and discussed with patient and responsible adult.   Patient and responsible adult offered Same Day Welcome Packet to review.    Call light in reach.   Bed in lowest position, locked, with one bed rail up.   SCDs and warming blanket in place.  Appropriate arm bands on patient.   Bathroom offered.   All questions and concerns of patient addressed.        Meds to Beds:   Patient informed of [facility] Meds to Beds program during admission. Patient is agreeable to program.   Contact information for the pharmacy and the Meds to Beds program:   Name: Matt   Relationship to patient:spouse/significant other   Phone number: [phone]

## 2024-01-30 NOTE — OP NOTE
[place] [id number]                                OPERATIVE REPORT    PATIENT NAME: MIAH ROJO                   :        1955  MED REC NO:   [id number]                           ROOM:  ACCOUNT NO:   [id number]                           ADMIT DATE: 2024  PROVIDER:     Tono Rodriguez M.D.    DATE OF PROCEDURE:  2024    SURGEON:  Tono Rodriguez MD    ASSISTANT:  Ramses Murphy PA-C    PREOPERATIVE DIAGNOSIS:  Right hip degenerative joint disease (protrusio  acetabuli).    POSTOPERATIVE DIAGNOSIS:  Right hip degenerative joint disease  (protrusio acetabuli).    OPERATIONS PERFORMED:  Right total hip replacement.    ANESTHESIA:  Spinal plus block.    SPECIMENS TO PATHOLOGY:  None.    IMPLANTS:  These were from [employer] systems:  1.  Size 50 mm outer diameter three-hole R3 acetabular shell with three  screws and one apex hole eliminator.  2.  Size 32 mm inner diameter 20-degree lip polyethylene liner.  3.  Size 14 standard offset Synergy cementless femoral stem.  4.  Size 32 mm +0 cobalt-chrome femoral head.    COMPLICATIONS:  None.    TOURNIQUET TIME:  None.    ESTIMATED BLOOD LOSS:  300 mL.    ANTIBIOTICS:  Preoperative Ancef, intraoperative vancomycin powder, and  Irrisept.    HISTORY OF PRESENT ILLNESS:  The patient is a pleasant 68-year-old  female who was referred to us with bilateral hip complaints, right  greater than left, increasing pain, problems with increase in stiffness,  increasing difficulties with activities of daily living including in and  out of chairs, up and down stairs, and in and out of cars.  She has  tried and failed conservative measures including antiinflammatory  medications, activity modification, external assist devices as well as  home exercise programs, injections, etc., and now she is opting to move  on with surgical intervention in the form of hip replacement on  14.  I broached a 14, which had good fit  and fill.  Went to a standard offset 0 ball, reduced the hip, had no  instability.  The hip was then dislocated.  Removed the broach, impacted  the real stem to where it was 2 or 3 mm proud, impacted the real 32 +0  cobalt-chrome head, reduced the hip, it had no instability.  The hip was  then copiously irrigated with a combination of normal saline and  Irrisept.  Sprinkled with 500 mg of vancomycin powder.  It was then  closed in layers with #1 PDS in the minimus followed by the medius and  vastus lateralis followed by injection of 1 gm of tranexamic acid into  the joint.  IT band was closed with #2 Quill.  Skin was then closed with  2-0 Vicryl, staples, Dermabond, and dressing was applied.  The patient  was rolled into supine position.  She remained awake throughout the  entire procedure, taken to the recovery room in satisfactory condition.   All needle and sponge counts were correct.    Ramses Murphy PA-C, assisted throughout the procedure with  positioning, draping, retraction, wound closure, dressing, and splint  application.        MAXWELL BENNETT M.D.    D: 01/30/2024 8:32:11       T: 01/30/2024 9:01:06     [email or web address]  Job#: [id number]     Doc#: [id number]    CC:

## 2024-01-30 NOTE — BRIEF OP NOTE
Brief Postoperative Note      Patient: Tenisha Solis  YOB: 1955  MRN: [id number]    Date of Procedure: 1/30/2024    Pre-Op Diagnosis Codes:     * Osteoarthritis of right hip, unspecified osteoarthritis type [M16.11]    Post-Op Diagnosis: Same       Procedure(s):  Right Total Hip Lateral Approach    Surgeon(s):  Tono Rodriguez MD    Assistant:  Physician Assistant: Ramses Murphy PA    Anesthesia: Spinal    Estimated Blood Loss (mL): 300     Complications: None    Specimens:   * No specimens in log *    Implants:  Implant Name Type Inv. Item Serial No.  Lot No. LRB No. Used Action   SCREW BNE L20MM DIA6.5MM CANC HIP SPHR HD FOR ACET SYS - [id number]  SCREW BNE L20MM DIA6.5MM CANC HIP SPHR HD FOR ACET SYS  [facility] [employer] [facility] [id number] Right 1 Implanted   SCREW BNE L30MM DIA6.5MM CANC HIP SPHR HD FOR ACET SYS - [id number]  SCREW BNE L30MM DIA6.5MM CANC HIP SPHR HD FOR ACET SYS  [facility] [id number] Right 1 Implanted   SCREW BNE L20MM DIA6.5MM CANC HIP SPHR HD FOR ACET SYS - [id number]  SCREW BNE L20MM DIA6.5MM CANC HIP SPHR HD FOR ACET SYS  [employer] [facility] [id number] Right 1 Implanted   LINER ACET OD50MM ID32MM TAPR REGION THK5.3MM LOAD BEAR - [id number]  LINER ACET OD50MM ID32MM TAPR REGION THK5.3MM LOAD BEAR  [employer] [facility] [id number] Right 1 Implanted   COVER H ACET HIP THRD FOR REFLCT SYS R3 - [id number]  COVER H ACET HIP THRD FOR REFLCT SYS R3  [facility] [id number] Right 1 Implanted   SHELL ACET [id number] 3 H STD HIP POLY POR PRESSFIT R3 - [id number]  SHELL ACET [id number] 3 H STD HIP POLY POR PRESSFIT R3  [facility] [id number] Right 1 Implanted   STEM FEM SZ 14 L160MM HIP TI POR STD OFFSET CEMENTLESS FOR - [id number]  STEM FEM SZ 14 L160MM HIP TI POR STD OFFSET CEMENTLESS FOR  [employer] [id number] Right 1 Implanted   HEAD FEM [id number] L+0MM HIP CO

## 2024-01-30 NOTE — ANESTHESIA PROCEDURE NOTES
Spinal Block    Patient location during procedure: OR  End time: 1/30/2024 7:23 AM  Reason for block: primary anesthetic  Staffing  Performed: resident/CRNA   Anesthesiologist: John Jordan DO  Resident/CRNA: John Delgado APRN - CRNA  Performed by: John Delgado APRN - CRNA  Authorized by: John Delgado APRN - CRNA    Spinal Block  Patient position: sitting  Prep: ChloraPrep  Patient monitoring: cardiac monitor, continuous pulse ox, oxygen and frequent blood pressure checks  Approach: midline  Location: L4/L5  Guidance: paresthesia technique  Provider prep: mask and sterile gloves  Local infiltration: lidocaine  Needle  Needle type: Pencan   Needle gauge: 25 G  Needle length: 3.5 in  Assessment  Sensory level: T6  Swirl obtained: Yes  CSF: clear  Attempts: 1  Hemodynamics: stable  Preanesthetic Checklist  Completed: patient identified, IV checked, site marked, risks and benefits discussed, surgical/procedural consents, equipment checked, pre-op evaluation, timeout performed, anesthesia consent given, oxygen available, monitors applied/VS acknowledged, fire risk safety assessment completed and verbalized and blood product R/B/A discussed and consented

## 2024-01-31 LAB
ANION GAP SERPL CALC-SCNC: 19 MEQ/L (ref 8–16)
BASOPHILS ABSOLUTE: 0 THOU/MM3 (ref 0–0.1)
BASOPHILS NFR BLD AUTO: 0.2 %
BUN SERPL-MCNC: 35 MG/DL (ref 7–22)
CALCIUM SERPL-MCNC: 9.1 MG/DL (ref 8.5–10.5)
CHLORIDE SERPL-SCNC: 95 MEQ/L (ref 98–111)
CO2 SERPL-SCNC: 27 MEQ/L (ref 23–33)
CREAT SERPL-MCNC: 5.6 MG/DL (ref 0.4–1.2)
DEPRECATED RDW RBC AUTO: 45.5 FL (ref 35–45)
EOSINOPHIL NFR BLD AUTO: 0.7 %
EOSINOPHILS ABSOLUTE: 0 THOU/MM3 (ref 0–0.4)
ERYTHROCYTE [DISTWIDTH] IN BLOOD BY AUTOMATED COUNT: 13.5 % (ref 11.5–14.5)
GFR SERPL CREATININE-BSD FRML MDRD: 8 ML/MIN/1.73M2
GLUCOSE SERPL-MCNC: 98 MG/DL (ref 70–108)
HCT VFR BLD AUTO: 29.7 % (ref 37–47)
HGB BLD-MCNC: 9.4 GM/DL (ref 12–16)
IMM GRANULOCYTES # BLD AUTO: 0.03 THOU/MM3 (ref 0–0.07)
IMM GRANULOCYTES NFR BLD AUTO: 0.5 %
LYMPHOCYTES ABSOLUTE: 0.6 THOU/MM3 (ref 1–4.8)
LYMPHOCYTES NFR BLD AUTO: 11.4 %
MCH RBC QN AUTO: 28.9 PG (ref 26–33)
MCHC RBC AUTO-ENTMCNC: 31.6 GM/DL (ref 32.2–35.5)
MCV RBC AUTO: 91.4 FL (ref 81–99)
MONOCYTES ABSOLUTE: 0.9 THOU/MM3 (ref 0.4–1.3)
MONOCYTES NFR BLD AUTO: 15.7 %
NEUTROPHILS NFR BLD AUTO: 71.5 %
NRBC BLD AUTO-RTO: 0 /100 WBC
PLATELET # BLD AUTO: 204 THOU/MM3 (ref 130–400)
PMV BLD AUTO: 10.4 FL (ref 9.4–12.4)
POTASSIUM SERPL-SCNC: 4.7 MEQ/L (ref 3.5–5.2)
RBC # BLD AUTO: 3.25 MILL/MM3 (ref 4.2–5.4)
SEGMENTED NEUTROPHILS ABSOLUTE COUNT: 3.9 THOU/MM3 (ref 1.8–7.7)
SODIUM SERPL-SCNC: 141 MEQ/L (ref 135–145)
T4 FREE SERPL-MCNC: 1.12 NG/DL (ref 0.93–1.76)
TSH SERPL DL<=0.005 MIU/L-ACNC: 1.21 UIU/ML (ref 0.4–4.2)
WBC # BLD AUTO: 5.5 THOU/MM3 (ref 4.8–10.8)

## 2024-01-31 PROCEDURE — 97530 THERAPEUTIC ACTIVITIES: CPT

## 2024-01-31 PROCEDURE — 6360000002 HC RX W HCPCS: Performed by: INTERNAL MEDICINE

## 2024-01-31 PROCEDURE — 6360000002 HC RX W HCPCS: Performed by: PHYSICIAN ASSISTANT

## 2024-01-31 PROCEDURE — 5A1D70Z PERFORMANCE OF URINARY FILTRATION, INTERMITTENT, LESS THAN 6 HOURS PER DAY: ICD-10-PCS | Performed by: ORTHOPAEDIC SURGERY

## 2024-01-31 PROCEDURE — 84439 ASSAY OF FREE THYROXINE: CPT

## 2024-01-31 PROCEDURE — 36415 COLL VENOUS BLD VENIPUNCTURE: CPT

## 2024-01-31 PROCEDURE — 97110 THERAPEUTIC EXERCISES: CPT

## 2024-01-31 PROCEDURE — 6370000000 HC RX 637 (ALT 250 FOR IP): Performed by: ORTHOPAEDIC SURGERY

## 2024-01-31 PROCEDURE — 6360000002 HC RX W HCPCS: Performed by: ORTHOPAEDIC SURGERY

## 2024-01-31 PROCEDURE — 80048 BASIC METABOLIC PNL TOTAL CA: CPT

## 2024-01-31 PROCEDURE — 90935 HEMODIALYSIS ONE EVALUATION: CPT

## 2024-01-31 PROCEDURE — 85025 COMPLETE CBC W/AUTO DIFF WBC: CPT

## 2024-01-31 PROCEDURE — 1200000000 HC SEMI PRIVATE

## 2024-01-31 PROCEDURE — 97162 PT EVAL MOD COMPLEX 30 MIN: CPT

## 2024-01-31 PROCEDURE — 2580000003 HC RX 258: Performed by: ORTHOPAEDIC SURGERY

## 2024-01-31 PROCEDURE — 84443 ASSAY THYROID STIM HORMONE: CPT

## 2024-01-31 PROCEDURE — 90935 HEMODIALYSIS ONE EVALUATION: CPT | Performed by: INTERNAL MEDICINE

## 2024-01-31 RX ORDER — CEFAZOLIN SODIUM 1 G/50ML
1000 INJECTION, SOLUTION INTRAVENOUS EVERY 24 HOURS
Status: DISCONTINUED | OUTPATIENT
Start: 2024-01-31 | End: 2024-02-02 | Stop reason: HOSPADM

## 2024-01-31 RX ADMIN — ACETAMINOPHEN 650 MG: 325 TABLET ORAL at 01:04

## 2024-01-31 RX ADMIN — HYDROMORPHONE HYDROCHLORIDE 0.5 MG: 1 INJECTION, SOLUTION INTRAMUSCULAR; INTRAVENOUS; SUBCUTANEOUS at 20:36

## 2024-01-31 RX ADMIN — EPOETIN ALFA-EPBX 6000 UNITS: 3000 INJECTION, SOLUTION INTRAVENOUS; SUBCUTANEOUS at 18:17

## 2024-01-31 RX ADMIN — CLONIDINE HYDROCHLORIDE 0.2 MG: 0.2 TABLET ORAL at 22:51

## 2024-01-31 RX ADMIN — LEVOTHYROXINE SODIUM 100 MCG: 0.1 TABLET ORAL at 04:45

## 2024-01-31 RX ADMIN — TEMAZEPAM 30 MG: 15 CAPSULE ORAL at 22:55

## 2024-01-31 RX ADMIN — CINACALCET HYDROCHLORIDE 30 MG: 30 TABLET, FILM COATED ORAL at 22:51

## 2024-01-31 RX ADMIN — ASPIRIN 325 MG: 325 TABLET, COATED ORAL at 22:56

## 2024-01-31 RX ADMIN — POLYETHYLENE GLYCOL 3350 17 G: 17 POWDER, FOR SOLUTION ORAL at 13:28

## 2024-01-31 RX ADMIN — CEFAZOLIN SODIUM 1000 MG: 1 INJECTION, SOLUTION INTRAVENOUS at 17:46

## 2024-01-31 RX ADMIN — SODIUM CHLORIDE, PRESERVATIVE FREE 10 ML: 5 INJECTION INTRAVENOUS at 23:11

## 2024-01-31 RX ADMIN — CINACALCET HYDROCHLORIDE 30 MG: 30 TABLET, FILM COATED ORAL at 13:20

## 2024-01-31 RX ADMIN — PROCHLORPERAZINE EDISYLATE 10 MG: 5 INJECTION INTRAMUSCULAR; INTRAVENOUS at 20:35

## 2024-01-31 RX ADMIN — HYDROCODONE BITARTRATE AND ACETAMINOPHEN 1 TABLET: 5; 325 TABLET ORAL at 13:28

## 2024-01-31 RX ADMIN — MINOXIDIL 2.5 MG: 2.5 TABLET ORAL at 22:51

## 2024-01-31 RX ADMIN — SODIUM CHLORIDE, PRESERVATIVE FREE 10 ML: 5 INJECTION INTRAVENOUS at 13:22

## 2024-01-31 RX ADMIN — HYDROMORPHONE HYDROCHLORIDE 0.5 MG: 1 INJECTION, SOLUTION INTRAMUSCULAR; INTRAVENOUS; SUBCUTANEOUS at 15:38

## 2024-01-31 RX ADMIN — NIFEDIPINE 60 MG: 60 TABLET, EXTENDED RELEASE ORAL at 22:51

## 2024-01-31 RX ADMIN — ACETAMINOPHEN 650 MG: 325 TABLET ORAL at 22:55

## 2024-01-31 RX ADMIN — ASPIRIN 325 MG: 325 TABLET, COATED ORAL at 13:27

## 2024-01-31 RX ADMIN — HYDROCODONE BITARTRATE AND ACETAMINOPHEN 1 TABLET: 5; 325 TABLET ORAL at 09:15

## 2024-01-31 RX ADMIN — ACETAMINOPHEN 650 MG: 325 TABLET ORAL at 13:30

## 2024-01-31 RX ADMIN — HYDROMORPHONE HYDROCHLORIDE 0.5 MG: 1 INJECTION, SOLUTION INTRAMUSCULAR; INTRAVENOUS; SUBCUTANEOUS at 04:43

## 2024-01-31 RX ADMIN — ACETAMINOPHEN 650 MG: 325 TABLET ORAL at 06:45

## 2024-01-31 RX ADMIN — PROCHLORPERAZINE EDISYLATE 10 MG: 5 INJECTION INTRAMUSCULAR; INTRAVENOUS at 14:53

## 2024-01-31 RX ADMIN — CYCLOBENZAPRINE 10 MG: 10 TABLET, FILM COATED ORAL at 22:50

## 2024-01-31 ASSESSMENT — PAIN SCALES - GENERAL
PAINLEVEL_OUTOF10: 6
PAINLEVEL_OUTOF10: 9
PAINLEVEL_OUTOF10: 7
PAINLEVEL_OUTOF10: 8
PAINLEVEL_OUTOF10: 6
PAINLEVEL_OUTOF10: 9

## 2024-01-31 ASSESSMENT — PAIN - FUNCTIONAL ASSESSMENT
PAIN_FUNCTIONAL_ASSESSMENT: ACTIVITIES ARE NOT PREVENTED
PAIN_FUNCTIONAL_ASSESSMENT: ACTIVITIES ARE NOT PREVENTED

## 2024-01-31 ASSESSMENT — PAIN DESCRIPTION - ORIENTATION
ORIENTATION: RIGHT
ORIENTATION: RIGHT

## 2024-01-31 ASSESSMENT — PAIN DESCRIPTION - LOCATION
LOCATION: HIP;LEG
LOCATION: HIP;LEG

## 2024-01-31 ASSESSMENT — PAIN DESCRIPTION - DESCRIPTORS
DESCRIPTORS: SPASM
DESCRIPTORS: ACHING

## 2024-01-31 ASSESSMENT — PAIN DESCRIPTION - PAIN TYPE: TYPE: SURGICAL PAIN

## 2024-01-31 NOTE — PLAN OF CARE
Problem: Discharge Planning  Goal: Discharge to home or other facility with appropriate resources  1/31/2024 1841 by Amy Moore RN  Outcome: Progressing  Flowsheets (Taken 1/31/2024 1315)  Discharge to home or other facility with appropriate resources:   Identify barriers to discharge with patient and caregiver   Arrange for needed discharge resources and transportation as appropriate   Identify discharge learning needs (meds, wound care, etc)   Refer to discharge planning if patient needs post-hospital services based on physician order or complex needs related to functional status, cognitive ability or social support system     Problem: Safety - Adult  Goal: Free from fall injury  Outcome: Progressing  Flowsheets (Taken 1/31/2024 1841)  Free From Fall Injury: Instruct family/caregiver on patient safety     Problem: ABCDS Injury Assessment  Goal: Absence of physical injury  Outcome: Progressing  Flowsheets (Taken 1/31/2024 1841)  Absence of Physical Injury: Implement safety measures based on patient assessment     Problem: Skin/Tissue Integrity  Goal: Absence of new skin breakdown  Description: 1.  Monitor for areas of redness and/or skin breakdown  2.  Assess vascular access sites hourly  3.  Every 4-6 hours minimum:  Change oxygen saturation probe site  4.  Every 4-6 hours:  If on nasal continuous positive airway pressure, respiratory therapy assess nares and determine need for appliance change or resting period.  Outcome: Progressing     Problem: Pain  Goal: Verbalizes/displays adequate comfort level or baseline comfort level  Outcome: Progressing  Flowsheets (Taken 1/31/2024 1315)  Verbalizes/displays adequate comfort level or baseline comfort level:   Encourage patient to monitor pain and request assistance   Assess pain using appropriate pain scale   Administer analgesics based on type and severity of pain and evaluate response   Implement non-pharmacological measures as appropriate and evaluate  response   Consider cultural and social influences on pain and pain management   Notify Licensed Independent Practitioner if interventions unsuccessful or patient reports new pain   Care plan reviewed with patient and spouse.  Patient and spouse verbalize understanding of the plan of care and contribute to goal setting.

## 2024-01-31 NOTE — CARE COORDINATION
1/31/24, 2:19 PM EST    DISCHARGE PLANNING EVALUATION    This SW met with patient and spouse after dialysis this afternoon. Patient and  expressed interest in HH services, JARETH Hummel provided them with a list of agencies. Family also interested in IPR, JARETH to contact Katelyn RUBIO will continue to follow.

## 2024-01-31 NOTE — PROGRESS NOTES
[facility]  PHYSICAL THERAPY MISSED TREATMENT NOTE  [facility]    Date: 2024  Patient Name: Tenisha Solis        MRN: [id number]   : 1955  (68 y.o.)  Gender: female                REASON FOR MISSED TREATMENT:  Pt off unit this am for dialysis.  Will attempt to see in pm. .

## 2024-01-31 NOTE — PROGRESS NOTES
[facility]  INPATIENT REHABILITATION  ADMISSIONS COORDINATOR CONSULT    Referral Type: internal    Patient Name: Tenisha Solis      MRN: [id number]    : 1955  (68 y.o.)  Gender: female   Race:White (non-)     Payor Source: Payor: MEDICARE / Plan: MEDICARE PART A AND B / Product Type: *No Product type* /   Secondary Payor Source:  [place] [employer] [place]    Isolation Status: No active isolations    Lives With: Spouse (and adult granddaughter)  Type of Home: House  Home Layout: One level  Home Access: Stairs to enter with rails  Entrance Stairs - Number of Steps: 1+1.      Disciplines Required upon Admission to Inpatient Rehabilitation: Physical Therapy and Occupational Therapy  Post operative: Yes  Fall: No  Dialysis: Yes  Diet: ADULT DIET; Regular; Vegetarian (Lacto-Ovo)  Discussed patient with  and PM&R provider: Await completed therapy notes.  Will communicate with CM team tomorrow regarding rehab referral.

## 2024-01-31 NOTE — PLAN OF CARE
Problem: Discharge Planning  Goal: Discharge to home or other facility with appropriate resources  1/31/2024 1416 by Jasmin Akers LSW  Outcome: Progressing       Consult received. Please see SW note dated 1/31.

## 2024-01-31 NOTE — PROGRESS NOTES
Progress Note    Subjective:     Post-Operative Day: 1 Status Post right Total Hip Arthroplasty  Systemic or Specific Complaints:No Complaints    Objective:   VITALS:  /61   Pulse 62   Temp 97.7 °F (36.5 °C) (Oral)   Resp 16   Ht 1.651 m (5' 5\")   Wt 58.8 kg (129 lb 9.6 oz)   SpO2 96%   BMI 21.57 kg/m²     General: alert, appears stated age, and cooperative   Wound: Wound clean and dry no evidence of infection.   DVT Exam: No evidence of DVT seen on physical exam.   Abdomen soft and non tender  NVI in lower extremity. Thigh swollen but soft. Moving foot and ankle.      Data Review  CBC:   Lab Results   Component Value Date/Time    WBC 5.3 08/12/2023 05:18 AM    RBC 3.65 08/12/2023 05:18 AM    HGB 10.7 08/12/2023 05:18 AM    HCT 33.2 08/12/2023 05:18 AM     08/12/2023 05:18 AM     BMP:   Lab Results   Component Value Date/Time     08/17/2023 12:00 AM    K 3.7 08/17/2023 12:00 AM    K 3.4 07/17/2021 03:50 AM    CL 93 08/17/2023 12:00 AM    CO2 39 08/17/2023 12:00 AM    BUN 23 08/17/2023 12:00 AM    CREATININE 4.90 08/17/2023 12:00 AM    GLUCOSE 108 08/17/2023 12:00 AM       Assessment:     Status Post right Total Hip Arthroplasty. Doing well postoperatively.     Plan:      1:  Continue Total Hip Replacement protocol   2:  Continue Deep venous thrombosis prophylaxis  3:  Continue physical therapy with Total Hip precautions  4:  Continue Pain Control  5:  Monitor Labs  6:  Discharge pending home vs SNF    Ramses Murphy PA-C in collaboration with Dr. Rodriguez

## 2024-01-31 NOTE — PROGRESS NOTES
[facility]  INPATIENT PHYSICAL THERAPY  EVALUATION  [facility] ORTHOPEDICS 7K - 7K-09/009-A    Time In: 1340  Time Out: 1416  Timed Code Treatment Minutes: 23 Minutes  Minutes: 36          Date: 2024  Patient Name: Tenisha Solis,  Gender:  female        MRN: [id number]  : 1955  (68 y.o.)      Referring Practitioner: MONSE Rodriguez MD  Diagnosis: Osteoarthritis of Rt hip.  Additional Pertinent Hx: Pt admitted for Rt THR (24) due to DJDa nd possible necrosis of femoral head.  Pt also on dialysis 3x/wk (M,W, F) for ESRD     Restrictions/Precautions:  Restrictions/Precautions: Surgical Protocols, General Precautions  Position Activity Restriction  Hip Precautions: No hip flexion > 90 degrees, No ABduction, No ADduction  Other position/activity restrictions: Dr. Rodriguez, lateral hip approach protocol    Subjective:  Chart Reviewed: Yes  Patient assessed for rehabilitation services?: Yes  Family / Caregiver Present: Yes (spouse.  Very supportive and will be available to assist.)  Subjective: Nurse approved session.  Pt resting in bed.  Pleasant and cooperative.  Stated she is fatigued due to having dialysis this am.  Pt was somewhat nauseous during session.    General:     Vision: Within Functional Limits  Hearing: Within functional limits       Pain: not rated/10: current with pain meds.  Rt LE.    Vitals: Vitals not assessed per clinical judgement, see nursing flowsheet    Social/Functional History:    Lives With: Spouse (and adult granddaughter)  Type of Home: House  Home Layout: One level  Home Access: Stairs to enter with rails  Entrance Stairs - Number of Steps: 1+1.  Entrance Stairs - Rails: Both (too wide to use at same time)  Home Equipment: Cane, Walker, standard, Walker, rolling                   Ambulation Assistance: Independent  Transfer Assistance: Independent    Mode of Transportation: Car          OBJECTIVE:  Range of Motion:  Right Lower Extremity: WFL, maintaining hip

## 2024-01-31 NOTE — PLAN OF CARE
Problem: Discharge Planning  Goal: Discharge to home or other facility with appropriate resources  Outcome: Progressing     Problem: Skin/Tissue Integrity  Goal: Absence of new skin breakdown  Description: 1.  Monitor for areas of redness and/or skin breakdown  2.  Assess vascular access sites hourly  3.  Every 4-6 hours minimum:  Change oxygen saturation probe site  4.  Every 4-6 hours:  If on nasal continuous positive airway pressure, respiratory therapy assess nares and determine need for appliance change or resting period.  Outcome: Progressing     Problem: Pain  Goal: Verbalizes/displays adequate comfort level or baseline comfort level  Outcome: Progressing

## 2024-01-31 NOTE — PROGRESS NOTES
[facility]  OCCUPATIONAL THERAPY MISSED TREATMENT NOTE  [facility] ORTHOPEDICS 7K  7K-09/009-A      Date: 2024  Patient Name: Tenisha Solis        CSN: [id number]   : 1955  (68 y.o.)  Gender: female                REASON FOR MISSED TREATMENT:  OT attempted to see 2x this date. 1st attempt ot was off of floor for dialysis and 2nd attempt pt was in with PT. OT will check back as time allows.

## 2024-01-31 NOTE — DISCHARGE INSTRUCTIONS
Follow up in 2 weeks  Wbat with walker  May shower when no drainage  Follow hip precautions  Jason hose both legs  [place] script sent to pharmacy from [facility]  Daily dressing changes until no drainage

## 2024-01-31 NOTE — FLOWSHEET NOTE
Stable 3.5 hour treatment complete. Removed 1 liter of fluid as per order. Tolerated fluid removal well. Pressure held to needle sites times ten minutes each. Dressing clean, dry and intact. Report given to primary RN. Treatment record printed for scanning into EMR.   01/31/24 0734 01/31/24 1132   Vital Signs   BP (!) 161/72 (!) 107/56   Temp 97.7 °F (36.5 °C) 98.7 °F (37.1 °C)   Pulse 67 71   Respirations 18 18   SpO2 96 % 97 %   Weight - Scale 58.6 kg (129 lb 3 oz) 57.6 kg (126 lb 15.8 oz)   Weight Method Bed scale Bed scale   Percent Weight Change  --  -1.71   Post-Hemodialysis Assessment   Post-Treatment Procedures  --  Blood returned;Access bleeding time < 10 minutes   Machine Disinfection Process  --  Acid/Vinegar Clean;Heat Disinfect;Exterior Machine Disinfection   Blood Volume Processed (Liters)  --  81.4 L   Dialyzer Clearance  --  Lightly streaked   Duration of Treatment (minutes)  --  210 minutes   Heparin Amount Administered During Treatment (mL)  --  0 mL   Hemodialysis Intake (ml)  --  400 ml   Hemodialysis Output (ml)  --  1400 ml   NET Removed (ml)  --  1000   Tolerated Treatment  --  Good

## 2024-01-31 NOTE — CARE COORDINATION
Case Management Assessment  Initial Evaluation    Date/Time of Evaluation: 1/31/2024 1:52 PM  Assessment Completed by: Estephanie Abebe RN    If patient is discharged prior to next notation, then this note serves as note for discharge by case management.    Patient Name: Tenisha Solis                   YOB: 1955  Diagnosis: Osteoarthritis of right hip, unspecified osteoarthritis type [M16.11]  Primary osteoarthritis of right hip [M16.11]                   Date / Time: 1/30/2024  5:21 AM  Location: [facility]09/009     Patient Admission Status: Inpatient   Readmission Risk Low 0-14, Mod 15-19), High > 20: Readmission Risk Score: 16.5    Current PCP: Axel Evans APRN  PCP verified by CM? Yes    Chart Reviewed: Yes      History Provided by: Patient  Patient Orientation: Alert and Oriented    Patient Cognition: Alert    Hospitalization in the last 30 days (Readmission):  No    If yes, Readmission Assessment in CM Navigator will be completed.    Advance Directives:      Code Status: Full Code   Patient's Primary Decision Maker is: Legal Next of Kin  Matt      Discharge Planning:    Patient lives with: Spouse/Significant Other   Type of Home: House  Primary Care Giver: Self  Patient Support Systems include: Spouse/Significant Other   Current Financial resources: Medicare  Current community resources: None  Current services prior to admission: Other (Comment), Durable Medical Equipment (HD in [employer] M-W-[employer]  transports)            Current DME: Walker, Cane            Type of Home Care services:  None    ADLS  Prior functional level: Independent in ADLs/IADLs  Current functional level: Assistance with the following:, Cooking, Housework, Shopping, Mobility    Family can provide assistance at DC: Yes  Would you like Case Management to discuss the discharge plan with any other family members/significant others, and if so, who? No  Plans to Return to Present Housing: Yes  Other Identified

## 2024-01-31 NOTE — PROGRESS NOTES
[facility]   Nephrology progress note  1/31/2024, 9:25 AM      Pt Name:    Tenisha Solis  MRN:     [id number]     YOB: 1955  Admit Date:    1/30/2024  5:21 AM    Chief Complaint: Nephrology following for  ESRD    Subjective:  Patient was seen and examined this morning  No chest pain or shortness of breath  Feels okay  Tolerating hemodialysis  Blood pressure 127/61  UF goal 1.4 kg    Objective:  24HR INTAKE/OUTPUT:    Intake/Output Summary (Last 24 hours) at 1/31/2024 0925  Last data filed at 1/31/2024 0413  Gross per 24 hour   Intake 750 ml   Output 100 ml   Net 650 ml         I/O last 3 completed shifts:  In: 1460 [P.O.:750; I.V.:700; IV Piggyback:10]  Out: 400 [Emesis/NG output:100; Blood:300]  No intake/output data recorded.   Admission weight: 58.8 kg (129 lb 9.6 oz)  Wt Readings from Last 3 Encounters:   01/31/24 58.6 kg (129 lb 3 oz)   01/23/24 60.1 kg (132 lb 6.4 oz)   01/12/24 58.1 kg (128 lb)        Vitals :   Vitals:    01/31/24 0051 01/31/24 0440 01/31/24 0513 01/31/24 0734   BP: (!) 117/57 131/61  (!) 161/72   Pulse: 67 62  67   Resp: 16 16 16 18   Temp: 97 °F (36.1 °C) 97.7 °F (36.5 °C)  97.7 °F (36.5 °C)   TempSrc: Oral Oral     SpO2: 96% 96%  96%   Weight:    58.6 kg (129 lb 3 oz)   Height:           Physical examination  General Appearance: alert and cooperative with exam, appears comfortable, no distress  Mouth/Throat: Oral mucosa moist  Neck: No JVD  Lungs: no use of accessory muscles  GI: soft, non-tender, no guarding  Extremities: no significant LE edema    Medications:  Infusion:    sodium chloride       Meds:    ceFAZolin  1,000 mg IntraVENous Q24H    cinacalcet  30 mg Oral BID    cloNIDine  0.2 mg Oral TID    levothyroxine  100 mcg Oral Daily    minoxidil  2.5 mg Oral BID    NIFEdipine  60 mg Oral BID    sodium chloride flush  5-40 mL IntraVENous 2 times per day    acetaminophen  650 mg Oral Q6H    polyethylene glycol  17 g Oral Daily    aspirin  325 mg

## 2024-02-01 LAB
ANION GAP SERPL CALC-SCNC: 15 MEQ/L (ref 8–16)
BUN SERPL-MCNC: 19 MG/DL (ref 7–22)
CALCIUM SERPL-MCNC: 9 MG/DL (ref 8.5–10.5)
CHLORIDE SERPL-SCNC: 92 MEQ/L (ref 98–111)
CO2 SERPL-SCNC: 29 MEQ/L (ref 23–33)
CREAT SERPL-MCNC: 3.8 MG/DL (ref 0.4–1.2)
GFR SERPL CREATININE-BSD FRML MDRD: 12 ML/MIN/1.73M2
GLUCOSE SERPL-MCNC: 87 MG/DL (ref 70–108)
HCT VFR BLD AUTO: 28.8 % (ref 37–47)
HGB BLD-MCNC: 9.4 GM/DL (ref 12–16)
POTASSIUM SERPL-SCNC: 3.6 MEQ/L (ref 3.5–5.2)
SODIUM SERPL-SCNC: 136 MEQ/L (ref 135–145)

## 2024-02-01 PROCEDURE — 2580000003 HC RX 258: Performed by: ORTHOPAEDIC SURGERY

## 2024-02-01 PROCEDURE — 6370000000 HC RX 637 (ALT 250 FOR IP): Performed by: ORTHOPAEDIC SURGERY

## 2024-02-01 PROCEDURE — 36415 COLL VENOUS BLD VENIPUNCTURE: CPT

## 2024-02-01 PROCEDURE — 97110 THERAPEUTIC EXERCISES: CPT

## 2024-02-01 PROCEDURE — 97535 SELF CARE MNGMENT TRAINING: CPT

## 2024-02-01 PROCEDURE — 97166 OT EVAL MOD COMPLEX 45 MIN: CPT

## 2024-02-01 PROCEDURE — 97530 THERAPEUTIC ACTIVITIES: CPT

## 2024-02-01 PROCEDURE — 6360000002 HC RX W HCPCS: Performed by: ORTHOPAEDIC SURGERY

## 2024-02-01 PROCEDURE — 80048 BASIC METABOLIC PNL TOTAL CA: CPT

## 2024-02-01 PROCEDURE — 6370000000 HC RX 637 (ALT 250 FOR IP): Performed by: PHYSICIAN ASSISTANT

## 2024-02-01 PROCEDURE — 6360000002 HC RX W HCPCS: Performed by: PHYSICIAN ASSISTANT

## 2024-02-01 PROCEDURE — 97116 GAIT TRAINING THERAPY: CPT

## 2024-02-01 PROCEDURE — 1200000000 HC SEMI PRIVATE

## 2024-02-01 PROCEDURE — 85014 HEMATOCRIT: CPT

## 2024-02-01 PROCEDURE — 99232 SBSQ HOSP IP/OBS MODERATE 35: CPT | Performed by: INTERNAL MEDICINE

## 2024-02-01 PROCEDURE — 85018 HEMOGLOBIN: CPT

## 2024-02-01 RX ORDER — OMEPRAZOLE 20 MG/1
20 CAPSULE, DELAYED RELEASE ORAL
Qty: 60 CAPSULE | Refills: 0 | Status: SHIPPED | OUTPATIENT
Start: 2024-02-01

## 2024-02-01 RX ORDER — ASPIRIN 325 MG
325 TABLET, DELAYED RELEASE (ENTERIC COATED) ORAL 2 TIMES DAILY
Qty: 60 TABLET | Refills: 0 | Status: SHIPPED | OUTPATIENT
Start: 2024-02-01 | End: 2024-03-02

## 2024-02-01 RX ORDER — HYDROCODONE BITARTRATE AND ACETAMINOPHEN 5; 325 MG/1; MG/1
2 TABLET ORAL EVERY 4 HOURS PRN
Status: DISCONTINUED | OUTPATIENT
Start: 2024-02-01 | End: 2024-02-02 | Stop reason: HOSPADM

## 2024-02-01 RX ORDER — ONDANSETRON 4 MG/1
4 TABLET, FILM COATED ORAL EVERY 8 HOURS PRN
Qty: 30 TABLET | Refills: 0 | Status: SHIPPED | OUTPATIENT
Start: 2024-02-01

## 2024-02-01 RX ADMIN — ASPIRIN 325 MG: 325 TABLET, COATED ORAL at 20:23

## 2024-02-01 RX ADMIN — ACETAMINOPHEN 650 MG: 325 TABLET ORAL at 13:32

## 2024-02-01 RX ADMIN — HYDROCODONE BITARTRATE AND ACETAMINOPHEN 1 TABLET: 5; 325 TABLET ORAL at 09:40

## 2024-02-01 RX ADMIN — ACETAMINOPHEN 650 MG: 325 TABLET ORAL at 20:23

## 2024-02-01 RX ADMIN — CYCLOBENZAPRINE 10 MG: 10 TABLET, FILM COATED ORAL at 20:24

## 2024-02-01 RX ADMIN — CINACALCET HYDROCHLORIDE 30 MG: 30 TABLET, FILM COATED ORAL at 20:25

## 2024-02-01 RX ADMIN — MINOXIDIL 2.5 MG: 2.5 TABLET ORAL at 20:26

## 2024-02-01 RX ADMIN — CLONIDINE HYDROCHLORIDE 0.2 MG: 0.2 TABLET ORAL at 13:32

## 2024-02-01 RX ADMIN — CEFAZOLIN SODIUM 1000 MG: 1 INJECTION, SOLUTION INTRAVENOUS at 18:28

## 2024-02-01 RX ADMIN — LEVOTHYROXINE SODIUM 100 MCG: 0.1 TABLET ORAL at 05:35

## 2024-02-01 RX ADMIN — SODIUM CHLORIDE, PRESERVATIVE FREE 10 ML: 5 INJECTION INTRAVENOUS at 09:39

## 2024-02-01 RX ADMIN — CYCLOBENZAPRINE 10 MG: 10 TABLET, FILM COATED ORAL at 11:52

## 2024-02-01 RX ADMIN — HYDROCODONE BITARTRATE AND ACETAMINOPHEN 1 TABLET: 5; 325 TABLET ORAL at 14:00

## 2024-02-01 RX ADMIN — MINOXIDIL 2.5 MG: 2.5 TABLET ORAL at 09:39

## 2024-02-01 RX ADMIN — CLONIDINE HYDROCHLORIDE 0.2 MG: 0.2 TABLET ORAL at 09:39

## 2024-02-01 RX ADMIN — CINACALCET HYDROCHLORIDE 30 MG: 30 TABLET, FILM COATED ORAL at 09:39

## 2024-02-01 RX ADMIN — NIFEDIPINE 60 MG: 60 TABLET, EXTENDED RELEASE ORAL at 09:39

## 2024-02-01 RX ADMIN — ASPIRIN 325 MG: 325 TABLET, COATED ORAL at 09:39

## 2024-02-01 RX ADMIN — HYDROCODONE BITARTRATE AND ACETAMINOPHEN 2 TABLET: 5; 325 TABLET ORAL at 18:22

## 2024-02-01 RX ADMIN — TEMAZEPAM 30 MG: 15 CAPSULE ORAL at 20:23

## 2024-02-01 RX ADMIN — ACETAMINOPHEN 650 MG: 325 TABLET ORAL at 05:35

## 2024-02-01 RX ADMIN — HYDROMORPHONE HYDROCHLORIDE 0.5 MG: 1 INJECTION, SOLUTION INTRAMUSCULAR; INTRAVENOUS; SUBCUTANEOUS at 00:36

## 2024-02-01 RX ADMIN — NIFEDIPINE 60 MG: 60 TABLET, EXTENDED RELEASE ORAL at 20:25

## 2024-02-01 RX ADMIN — POLYETHYLENE GLYCOL 3350 17 G: 17 POWDER, FOR SOLUTION ORAL at 09:39

## 2024-02-01 RX ADMIN — HYDROXYZINE PAMOATE 25 MG: 25 CAPSULE ORAL at 13:32

## 2024-02-01 RX ADMIN — TRAMADOL HYDROCHLORIDE 50 MG: 50 TABLET, COATED ORAL at 15:16

## 2024-02-01 RX ADMIN — SODIUM CHLORIDE, PRESERVATIVE FREE 10 ML: 5 INJECTION INTRAVENOUS at 22:17

## 2024-02-01 ASSESSMENT — PAIN DESCRIPTION - ORIENTATION
ORIENTATION: RIGHT

## 2024-02-01 ASSESSMENT — PAIN SCALES - GENERAL
PAINLEVEL_OUTOF10: 7
PAINLEVEL_OUTOF10: 6
PAINLEVEL_OUTOF10: 7
PAINLEVEL_OUTOF10: 8
PAINLEVEL_OUTOF10: 9
PAINLEVEL_OUTOF10: 5
PAINLEVEL_OUTOF10: 8
PAINLEVEL_OUTOF10: 7
PAINLEVEL_OUTOF10: 7

## 2024-02-01 ASSESSMENT — PAIN DESCRIPTION - DESCRIPTORS
DESCRIPTORS: ACHING;DISCOMFORT
DESCRIPTORS: ACHING
DESCRIPTORS: STABBING
DESCRIPTORS: SHOOTING
DESCRIPTORS: SORE;STABBING

## 2024-02-01 ASSESSMENT — PAIN DESCRIPTION - LOCATION
LOCATION: LEG
LOCATION: HIP
LOCATION: HIP;LEG
LOCATION: LEG
LOCATION: HIP

## 2024-02-01 ASSESSMENT — PAIN DESCRIPTION - ONSET: ONSET: ON-GOING

## 2024-02-01 ASSESSMENT — PAIN DESCRIPTION - PAIN TYPE: TYPE: SURGICAL PAIN

## 2024-02-01 ASSESSMENT — PAIN - FUNCTIONAL ASSESSMENT
PAIN_FUNCTIONAL_ASSESSMENT: PREVENTS OR INTERFERES SOME ACTIVE ACTIVITIES AND ADLS
PAIN_FUNCTIONAL_ASSESSMENT: PREVENTS OR INTERFERES SOME ACTIVE ACTIVITIES AND ADLS
PAIN_FUNCTIONAL_ASSESSMENT: ACTIVITIES ARE NOT PREVENTED

## 2024-02-01 ASSESSMENT — PAIN DESCRIPTION - FREQUENCY: FREQUENCY: CONTINUOUS

## 2024-02-01 NOTE — PLAN OF CARE
Problem: Discharge Planning  Goal: Discharge to home or other facility with appropriate resources  2/1/2024 1506 by Jordyn Combs, RN  Outcome: Progressing  Flowsheets (Taken 2/1/2024 1506)  Discharge to home or other facility with appropriate resources: Identify barriers to discharge with patient and caregiver     Problem: Safety - Adult  Goal: Free from fall injury  2/1/2024 1506 by Jordyn Combs, RN  Outcome: Progressing  Flowsheets (Taken 2/1/2024 1506)  Free From Fall Injury: Instruct family/caregiver on patient safety     Problem: ABCDS Injury Assessment  Goal: Absence of physical injury  2/1/2024 1506 by Jordyn Combs, RN  Outcome: Progressing  Flowsheets (Taken 1/31/2024 1841 by Amy Moore RN)  Absence of Physical Injury: Implement safety measures based on patient assessment     Problem: Skin/Tissue Integrity  Goal: Absence of new skin breakdown  Description: 1.  Monitor for areas of redness and/or skin breakdown  2.  Assess vascular access sites hourly  3.  Every 4-6 hours minimum:  Change oxygen saturation probe site  4.  Every 4-6 hours:  If on nasal continuous positive airway pressure, respiratory therapy assess nares and determine need for appliance change or resting period.  2/1/2024 1506 by Jordyn Combs RN  Outcome: Progressing     Problem: Pain  Goal: Verbalizes/displays adequate comfort level or baseline comfort level  2/1/2024 1506 by Jordyn Combs RN  Outcome: Progressing  Flowsheets (Taken 2/1/2024 1506)  Verbalizes/displays adequate comfort level or baseline comfort level:   Assess pain using appropriate pain scale   Encourage patient to monitor pain and request assistance   Care plan reviewed with patient.  Patient verbalized understanding of the plan of care and contribute to goal setting.

## 2024-02-01 NOTE — PLAN OF CARE
Problem: Skin/Tissue Integrity  Goal: Absence of new skin breakdown  Description: 1.  Monitor for areas of redness and/or skin breakdown  2.  Assess vascular access sites hourly  3.  Every 4-6 hours minimum:  Change oxygen saturation probe site  4.  Every 4-6 hours:  If on nasal continuous positive airway pressure, respiratory therapy assess nares and determine need for appliance change or resting period.  2/1/2024 0211 by Brain Martinez RN  Outcome: Progressing  1/31/2024 1841 by Amy Moore RN  Outcome: Progressing     Problem: Pain  Goal: Verbalizes/displays adequate comfort level or baseline comfort level  2/1/2024 0211 by Brain Martinez RN  Outcome: Progressing  1/31/2024 1841 by Amy Moore RN  Outcome: Progressing  Flowsheets (Taken 1/31/2024 1315)  Verbalizes/displays adequate comfort level or baseline comfort level:   Encourage patient to monitor pain and request assistance   Assess pain using appropriate pain scale   Administer analgesics based on type and severity of pain and evaluate response   Implement non-pharmacological measures as appropriate and evaluate response   Consider cultural and social influences on pain and pain management   Notify Licensed Independent Practitioner if interventions unsuccessful or patient reports new pain     Problem: Safety - Adult  Goal: Free from fall injury  2/1/2024 0211 by Brain Martinez RN  Outcome: Progressing  1/31/2024 1841 by Amy Moore RN  Outcome: Progressing  Flowsheets (Taken 1/31/2024 1841)  Free From Fall Injury: Instruct family/caregiver on patient safety

## 2024-02-01 NOTE — PROGRESS NOTES
[facility]   Nephrology progress note  2/1/2024, 9:17 AM      Pt Name:    Tenisha Solis  MRN:     [id number]     YOB: 1955  Admit Date:    1/30/2024  5:21 AM    Chief Complaint: Nephrology following for  ESRD    Subjective:  Patient was seen and examined this morning  No chest pain or shortness of breath  Feeling better    Objective:  24HR INTAKE/OUTPUT:    Intake/Output Summary (Last 24 hours) at 2/1/2024 0917  Last data filed at 1/31/2024 2033  Gross per 24 hour   Intake 900 ml   Output 1400 ml   Net -500 ml           I/O last 3 completed shifts:  In: 1650 [P.O.:1250]  Out: 1500 [Emesis/NG output:100]  No intake/output data recorded.   Admission weight: 58.8 kg (129 lb 9.6 oz)  Wt Readings from Last 3 Encounters:   02/01/24 53.2 kg (117 lb 4.6 oz)   01/23/24 60.1 kg (132 lb 6.4 oz)   01/12/24 58.1 kg (128 lb)        Vitals :   Vitals:    01/31/24 2106 02/01/24 0032 02/01/24 0106 02/01/24 0344   BP:  (!) 114/57  (!) 110/55   Pulse:  66  62   Resp: 16 18 16 16   Temp:  98.1 °F (36.7 °C)  98.4 °F (36.9 °C)   TempSrc:  Oral  Oral   SpO2:  98%  97%   Weight:    53.2 kg (117 lb 4.6 oz)   Height:           Physical examination  General Appearance: alert and cooperative with exam, appears comfortable, no distress  Mouth/Throat: Oral mucosa moist  Neck: No JVD  Lungs: no use of accessory muscles  GI: soft, non-tender, no guarding    Medications:  Infusion:    sodium chloride       Meds:    epoetin mary kay-epbx  6,000 Units IntraVENous Once per day on Mon Wed Fri    ceFAZolin  1,000 mg IntraVENous Q24H    cinacalcet  30 mg Oral BID    cloNIDine  0.2 mg Oral TID    levothyroxine  100 mcg Oral Daily    minoxidil  2.5 mg Oral BID    NIFEdipine  60 mg Oral BID    sodium chloride flush  5-40 mL IntraVENous 2 times per day    acetaminophen  650 mg Oral Q6H    polyethylene glycol  17 g Oral Daily    aspirin  325 mg Oral BID     Meds prn: temazepam, sodium chloride flush, sodium chloride,  traMADol, cyclobenzaprine, hydrOXYzine pamoate, HYDROcodone 5 mg - acetaminophen, magnesium hydroxide, sodium phosphate, prochlorperazine, HYDROmorphone     Lab Data :  CBC:   Recent Labs     01/31/24  0609 02/01/24  0706   WBC 5.5  --    HGB 9.4* 9.4*   HCT 29.7* 28.8*     --        CMP:  Recent Labs     01/31/24  0609 02/01/24  0706    136   K 4.7 3.6   CL 95* 92*   CO2 27 29   BUN 35* 19   CREATININE 5.6* 3.8*   GLUCOSE 98 87   CALCIUM 9.1 9.0       Hepatic: No results for input(s): \"LABALBU\", \"AST\", \"ALT\", \"ALB\", \"BILITOT\", \"ALKPHOS\" in the last 72 hours.      Assessment and Plan:  ESRD on dialysis  Tolerating hemodialysis treatment well.  She had dialysis yesterday with 1 L of ultrafiltration  Electrolytes, volume status and labs are stable  Continue dialysis 3 times weekly  Next hemotreatment in a.m.  Hypertension.  Continue oral medications  Status post right hip arthroplasty  Anemia in ESRD.  On  Retacrit  Secondary hyperparathyroidism    D/W patient and orthopedics    Ezekiel Arceo MD  [facility]    This report has been created using voice recognition software. It may contain minor errors which are inherent in voice recognition technology

## 2024-02-01 NOTE — CARE COORDINATION
2/1/24, 1:30 PM EST    DISCHARGE PLANNING EVALUATION    referral made to [facility], agency will follow at discharge.  Confirmed plan with patient, who is in agreement.      2/1/24, 2:46 PM EST    Patient goals/plan/ treatment preferences discussed by  and .  Patient goals/plan/ treatment preferences reviewed with patient/ family.  Patient/ family verbalize understanding of discharge plan and are in agreement with goal/plan/treatment preferences.  Understanding was demonstrated using the teach back method.  AVS provided by RN at time of discharge, which includes all necessary medical information pertaining to the patients current course of illness, treatment, post-discharge goals of care, and treatment preferences.     Services At/After Discharge: Home Health       IMM Letter  IMM Letter given to Patient/Family/Significant other/Guardian/POA/by:: JARETH: Estephanie LIVINGSTON  IMM Letter date given:: 01/31/24  IMM Letter time given:: 9236

## 2024-02-01 NOTE — PROGRESS NOTES
[facility]  INPATIENT PHYSICAL THERAPY  DAILY NOTE  [facility] ORTHOPEDICS 7K - 7K-09/009-A    Time In: 1210  Time Out: 1235  Timed Code Treatment Minutes: 26 Minutes  Minutes: 25          Date: 2024  Patient Name: Tenisha Solis,  Gender:  female        MRN: [id number]  : 1955  (68 y.o.)     Referring Practitioner: MONSE Rodriguez MD  Diagnosis: Osteoarthritis of Rt hip.  Additional Pertinent Hx: Pt admitted for Rt THR (24) due to DJDa nd possible necrosis of femoral head.  Pt also on dialysis 3x/wk (M,W, F) for ESRD     Prior Level of Function:  Lives With: Spouse (and adult granddaughter)  Type of Home: House  Home Layout: One level  Home Access: Stairs to enter with rails  Entrance Stairs - Number of Steps: 1+1.  Entrance Stairs - Rails: Both  Home Equipment: Cane, Walker, standard, Walker, rolling   Bathroom Shower/Tub: Tub/Shower unit  Bathroom Equipment: Grab bars in shower    ADL Assistance: Independent  Homemaking Assistance: Independent  Ambulation Assistance: Independent  Transfer Assistance: Independent  Active : Yes ( drives pt to dialysis)  Additional Comments: Pt has been using a cane for the last 4-5 months when her hip started hurting her more. Able to ambulate without AD prior to that time. Typically indep with ADLs and IADLs.    Restrictions/Precautions:  Restrictions/Precautions: Surgical Protocols, General Precautions  Position Activity Restriction  Hip Precautions: No hip flexion > 90 degrees, No ABduction, No ADduction  Other position/activity restrictions: Dr. Rodriguez, lateral hip approach protocol     SUBJECTIVE: YARA Cobb approved therapy session. Patient seated in BS chair upon PTA arrival. Patient's spouse present throughout therapy session. Patient request to return to bed at end of therapy session.    PAIN: 6/10: R hip    Vitals: Vitals not assessed per clinical judgement, see nursing flowsheet    OBJECTIVE:  Bed Mobility:  Sit to Supine:

## 2024-02-01 NOTE — PROGRESS NOTES
[facility]  INPATIENT OCCUPATIONAL THERAPY  [facility] ORTHOPEDICS 7K  EVALUATION    Time:   Time In: 805  Time Out: 839  Timed Code Treatment Minutes: 23 Minutes  Minutes: 34          Date: 2024  Patient Name: Tenisha Solis,   Gender: female      MRN: [id number]  : 1955  (68 y.o.)  Referring Practitioner: Tono Rodriguez MD  Diagnosis: osteoarthritis of right hip, unspecified OA type  Additional Pertinent Hx: Pt admitted for Rt THR (24) due to DJDa nd possible necrosis of femoral head.  Pt also on dialysis 3x/wk (M,W, F) for ESRD    Restrictions/Precautions:  Restrictions/Precautions: Surgical Protocols, General Precautions  Position Activity Restriction  Hip Precautions: No hip flexion > 90 degrees, No ABduction, No ADduction  Other position/activity restrictions: Dr. Rodriguez, lateral hip approach protocol    Subjective  Chart Reviewed: Yes, Orders, Progress Notes, History and Physical  Patient assessed for rehabilitation services?: Yes    Subjective: Nurse approved OT eval. pt in bed on OT arrival, pleasant and cooperative. pt reports she got very light-headed yesterday when she sat up with PT (after dialysis). Agreeable to try OOB activity again.    Pain: 5/10    Vitals: Blood Pressure: sittin/63; standin/65-no reports of light-headedness (pt reports she was light-headed yesterday when she tried to get up)    Social/Functional History:  Lives With: Spouse (and adult granddaughter)  Type of Home: House  Home Layout: One level  Home Access: Stairs to enter with rails  Entrance Stairs - Number of Steps: 1+1.  Entrance Stairs - Rails: Both  Home Equipment: Cane, Walker, standard, Walker, rolling   Bathroom Shower/Tub: Tub/Shower unit  Bathroom Equipment: Grab bars in shower       ADL Assistance: Independent  Homemaking Assistance: Independent  Ambulation Assistance: Independent  Transfer Assistance: Independent    Active : Yes ( drives pt to

## 2024-02-01 NOTE — PROGRESS NOTES
[facility]  INPATIENT PHYSICAL THERAPY  DAILY NOTE  [facility] ORTHOPEDICS 7K - 7K-09/009-A    Time In: 09  Time Out: 0937  Timed Code Treatment Minutes: 26 Minutes  Minutes: 26          Date: 2024  Patient Name: Tenisha Solis,  Gender:  female        MRN: [id number]  : 1955  (68 y.o.)     Referring Practitioner: MONSE Rodriguez MD  Diagnosis: Osteoarthritis of Rt hip.  Additional Pertinent Hx: Pt admitted for Rt THR (24) due to DJDa nd possible necrosis of femoral head.  Pt also on dialysis 3x/wk (M,W, F) for ESRD     Prior Level of Function:  Lives With: Spouse (and adult granddaughter)  Type of Home: House  Home Layout: One level  Home Access: Stairs to enter with rails  Entrance Stairs - Number of Steps: 1+1.  Entrance Stairs - Rails: Both  Home Equipment: Cane, Walker, standard, Walker, rolling   Bathroom Shower/Tub: Tub/Shower unit  Bathroom Equipment: Grab bars in shower    ADL Assistance: Independent  Homemaking Assistance: Independent  Ambulation Assistance: Independent  Transfer Assistance: Independent  Active : Yes ( drives pt to dialysis)  Additional Comments: Pt has been using a cane for the last 4-5 months when her hip started hurting her more. Able to ambulate without AD prior to that time. Typically indep with ADLs and IADLs.    Restrictions/Precautions:  Restrictions/Precautions: Surgical Protocols, General Precautions  Position Activity Restriction  Hip Precautions: No hip flexion > 90 degrees, No ABduction, No ADduction  Other position/activity restrictions: Dr. Rodriguez, lateral hip approach protocol     SUBJECTIVE: YARA Cobb approved therapy session. Patient seated in BS chair and agreeable to therapy session. Patient A&Ox4.     PAIN: 6/10: R hip    Vitals: Vitals not assessed per clinical judgement, see nursing flowsheet  Vitals:    24 0935   BP: (!) 147/63   Pulse: 63   Resp: 16   Temp: 97.9 °F (36.6 °C)   SpO2: 99%     OBJECTIVE:  Bed

## 2024-02-01 NOTE — CARE COORDINATION
2/1/24, 11:43 AM EST    DISCHARGE ON GOING EVALUATION    Tenisha SEGAL [facility] day: 2  Location: -09/009-A Reason for admit: Osteoarthritis of right hip, unspecified osteoarthritis type [M16.11]  Primary osteoarthritis of right hip [M16.11]   Procedure: 1/30 Right total hip replacement by Dr Rodriguez   Barriers to Discharge: POD 2, total hip precautions. Stop IV pain meds. Plans home Friday, cont pain control and therapy.   PCP: Axel Evans APRN  Readmission Risk Score: 16.6%  Patient Goals/Plan/Treatment Preferences: spoke with Tenisha; explained she does not qualify for IP rehab unit. HH list provided on 1/31/24.  Pt shared \"we are from [place], can you check with [facility] HH agency?\"    Updated Angela MOLINA

## 2024-02-01 NOTE — DISCHARGE SUMMARY
[address] [address]                               DISCHARGE SUMMARY    PATIENT NAME: MIAH ROJO                   :        1955  MED REC NO:   [id number]                           ROOM:       0009  ACCOUNT NO:   [id number]                           ADMIT DATE: 2024  PROVIDER:     SONAL Mccoy             DISCH DATE:    PREOPERATIVE DIAGNOSIS:  Right hip degenerative joint disease.    POSTOPERATIVE DIAGNOSIS:  Right hip degenerative joint disease.    OPERATION PERFORMED:  Right total hip replacement.    COMPLICATIONS:  None.    HISTORY OF PRESENT ILLNESS:  The patient is a pleasant 68-year-old  female who came in complaining of pain and discomfort in her right hip.   The pain was worse with her activities of daily living, including  walking, going up and down stairs, in and out of chairs, and in and out  of cars.  She has tried and failed conservative measures including  anti-inflammatory medications, activity modification, injections,  at-home physician-directed physical therapy.  At this point, she wants  to move forth with more definitive treatment in the form of her right  total hip replacement using lateral approach.  The risks and benefits of  surgery were discussed with her in detail including but not limited to  DVT; PE; flare-up of medical problems; limb length inequality;  stiffness; infection to nerve, artery, vein, bone, and soft tissue  damage and she would like to proceed.    HOSPITAL COURSE:  The patient was brought to the operating room on  2024 where she underwent a right total hip replacement using  approach.  She had no complications.  She was then admitted to the  general orthopedic floor for postop care where she had an uneventful  stay.  She did get her dialysis.  She was able to do well with the  walker.  She was able to be discharged on postoperative day #3

## 2024-02-01 NOTE — PROGRESS NOTES
Discuss patient with PPS coordinator regarding rehab referral, patient is not appropriate for intensive rehab program based on needs, patient had an elective single joint replacement, age 68 and BMI of 19.52.  Patient may benefit from subacute therapy in community based rehab center. JARETH Hummel updated.     Update:  1952  Rounded on unit, spoke with patient with therapy personnel in attendance explained that she does not meet criteria for admission to [facility] at this level of care.  Discussed options for subacute therapy vs. Home health  Therapist in attendance remarked that she felt that Home Health therapy would be appropriate as the patient  is available for help 24/7.

## 2024-02-01 NOTE — PROGRESS NOTES
Progress Note    Subjective:     Post-Operative Day: 2 Status Post right Total Hip Arthroplasty  Systemic or Specific Complaints:No Complaints    Objective:   VITALS:  BP (!) 110/55   Pulse 62   Temp 98.4 °F (36.9 °C) (Oral)   Resp 16   Ht 1.651 m (5' 5\")   Wt 53.2 kg (117 lb 4.6 oz)   SpO2 97%   BMI 19.52 kg/m²   24HR INTAKE/OUTPUT:    Intake/Output Summary (Last 24 hours) at 2024  Last data filed at 2024  Gross per 24 hour   Intake 900 ml   Output 1400 ml   Net -500 ml     TEMPERATURE:  Current - Temp: 98.4 °F (36.9 °C); Max - Temp  Av.4 °F (36.9 °C)  Min: 98.1 °F (36.7 °C)  Max: 98.7 °F (37.1 °C)    General: alert, appears stated age, and cooperative   Wound: Wound clean and dry no evidence of infection., No Erythema, and No Drainage   DVT Exam: No evidence of DVT seen on physical exam.  Negative Carmelo's sign.  No significant calf/ankle edema.     NVI in lower extremity. Thigh swollen but soft. Moving foot and ankle.      Data Review  CBC:   Lab Results   Component Value Date/Time    WBC 5.5 2024 06:09 AM    RBC 3.25 2024 06:09 AM    HGB 9.4 2024 07:06 AM    HCT 28.8 2024 07:06 AM     2024 06:09 AM     BMP:   Lab Results   Component Value Date/Time     2024 06:09 AM    K 4.7 2024 06:09 AM    K 3.4 2021 03:50 AM    CL 95 2024 06:09 AM    CO2 27 2024 06:09 AM    BUN 35 2024 06:09 AM    CREATININE 5.6 2024 06:09 AM    GLUCOSE 98 2024 06:09 AM       Assessment:     Status Post right Total Hip Arthroplasty. Doing well postoperatively.     Plan:      1:  Continue Total Hip Replacement protocol   2:  Continue Deep venous thrombosis prophylaxis  3:  Continue physical therapy with Total Hip precautions  4:  Continue Pain Control  5:  Home tomorrow

## 2024-02-02 VITALS
DIASTOLIC BLOOD PRESSURE: 63 MMHG | SYSTOLIC BLOOD PRESSURE: 157 MMHG | HEART RATE: 68 BPM | TEMPERATURE: 98.4 F | WEIGHT: 119.05 LBS | RESPIRATION RATE: 18 BRPM | OXYGEN SATURATION: 93 % | HEIGHT: 65 IN | BODY MASS INDEX: 19.83 KG/M2

## 2024-02-02 LAB
ANION GAP SERPL CALC-SCNC: 20 MEQ/L (ref 8–16)
BUN SERPL-MCNC: 36 MG/DL (ref 7–22)
CALCIUM SERPL-MCNC: 8.6 MG/DL (ref 8.5–10.5)
CHLORIDE SERPL-SCNC: 90 MEQ/L (ref 98–111)
CO2 SERPL-SCNC: 25 MEQ/L (ref 23–33)
CREAT SERPL-MCNC: 5.7 MG/DL (ref 0.4–1.2)
GFR SERPL CREATININE-BSD FRML MDRD: 8 ML/MIN/1.73M2
GLUCOSE SERPL-MCNC: 87 MG/DL (ref 70–108)
POTASSIUM SERPL-SCNC: 3.9 MEQ/L (ref 3.5–5.2)
SODIUM SERPL-SCNC: 135 MEQ/L (ref 135–145)

## 2024-02-02 PROCEDURE — 90935 HEMODIALYSIS ONE EVALUATION: CPT

## 2024-02-02 PROCEDURE — 2580000003 HC RX 258: Performed by: ORTHOPAEDIC SURGERY

## 2024-02-02 PROCEDURE — 6370000000 HC RX 637 (ALT 250 FOR IP): Performed by: ORTHOPAEDIC SURGERY

## 2024-02-02 PROCEDURE — 80048 BASIC METABOLIC PNL TOTAL CA: CPT

## 2024-02-02 PROCEDURE — 36415 COLL VENOUS BLD VENIPUNCTURE: CPT

## 2024-02-02 PROCEDURE — 99232 SBSQ HOSP IP/OBS MODERATE 35: CPT | Performed by: INTERNAL MEDICINE

## 2024-02-02 PROCEDURE — 6370000000 HC RX 637 (ALT 250 FOR IP): Performed by: PHYSICIAN ASSISTANT

## 2024-02-02 RX ADMIN — NIFEDIPINE 60 MG: 60 TABLET, EXTENDED RELEASE ORAL at 15:49

## 2024-02-02 RX ADMIN — ACETAMINOPHEN 650 MG: 325 TABLET ORAL at 03:36

## 2024-02-02 RX ADMIN — ASPIRIN 325 MG: 325 TABLET, COATED ORAL at 15:48

## 2024-02-02 RX ADMIN — SODIUM CHLORIDE, PRESERVATIVE FREE 10 ML: 5 INJECTION INTRAVENOUS at 15:50

## 2024-02-02 RX ADMIN — HYDROCODONE BITARTRATE AND ACETAMINOPHEN 2 TABLET: 5; 325 TABLET ORAL at 15:48

## 2024-02-02 RX ADMIN — MINOXIDIL 2.5 MG: 2.5 TABLET ORAL at 15:49

## 2024-02-02 RX ADMIN — POLYETHYLENE GLYCOL 3350 17 G: 17 POWDER, FOR SOLUTION ORAL at 15:48

## 2024-02-02 RX ADMIN — LEVOTHYROXINE SODIUM 100 MCG: 0.1 TABLET ORAL at 04:44

## 2024-02-02 RX ADMIN — HYDROCODONE BITARTRATE AND ACETAMINOPHEN 2 TABLET: 5; 325 TABLET ORAL at 05:45

## 2024-02-02 RX ADMIN — CLONIDINE HYDROCHLORIDE 0.2 MG: 0.2 TABLET ORAL at 15:50

## 2024-02-02 RX ADMIN — CINACALCET HYDROCHLORIDE 30 MG: 30 TABLET, FILM COATED ORAL at 15:49

## 2024-02-02 ASSESSMENT — PAIN SCALES - GENERAL
PAINLEVEL_OUTOF10: 7
PAINLEVEL_OUTOF10: 6
PAINLEVEL_OUTOF10: 4
PAINLEVEL_OUTOF10: 5

## 2024-02-02 ASSESSMENT — PAIN DESCRIPTION - PAIN TYPE: TYPE: SURGICAL PAIN

## 2024-02-02 ASSESSMENT — PAIN DESCRIPTION - DESCRIPTORS
DESCRIPTORS: ACHING;DISCOMFORT
DESCRIPTORS: ACHING;SORE

## 2024-02-02 ASSESSMENT — PAIN DESCRIPTION - LOCATION
LOCATION: LEG
LOCATION: LEG

## 2024-02-02 ASSESSMENT — PAIN DESCRIPTION - ORIENTATION
ORIENTATION: RIGHT
ORIENTATION: RIGHT

## 2024-02-02 ASSESSMENT — PAIN - FUNCTIONAL ASSESSMENT
PAIN_FUNCTIONAL_ASSESSMENT: PREVENTS OR INTERFERES SOME ACTIVE ACTIVITIES AND ADLS
PAIN_FUNCTIONAL_ASSESSMENT: PREVENTS OR INTERFERES SOME ACTIVE ACTIVITIES AND ADLS

## 2024-02-02 ASSESSMENT — PAIN DESCRIPTION - FREQUENCY: FREQUENCY: CONTINUOUS

## 2024-02-02 ASSESSMENT — PAIN DESCRIPTION - ONSET: ONSET: ON-GOING

## 2024-02-02 NOTE — PROGRESS NOTES
Incentive spirometer education provided to patient. Patient demonstrated properly and verbally understands importance of use at home.

## 2024-02-02 NOTE — CARE COORDINATION
2/2/24, 9:59 AM EST    DISCHARGE PLANNING EVALUATION    Home with family, STORM Curran.   Updated CHLORENZO Curran    2/2/24, 10:00 AM EST    Patient goals/plan/ treatment preferences discussed by  and .  Patient goals/plan/ treatment preferences reviewed with patient/ family.  Patient/ family verbalize understanding of discharge plan and are in agreement with goal/plan/treatment preferences.  Understanding was demonstrated using the teach back method.  AVS provided by RN at time of discharge, which includes all necessary medical information pertaining to the patients current course of illness, treatment, post-discharge goals of care, and treatment preferences.     Services At/After Discharge: Home Health       IMM Letter  IMM Letter given to Patient/Family/Significant other/Guardian/POA/by:: JARETH: Estephanie LIVINGSTON  IMM Letter date given:: 01/31/24  IMM Letter time given:: 0653

## 2024-02-02 NOTE — CARE COORDINATION
2/2/24, 10:31 AM EST    DISCHARGE ON GOING EVALUATION    Tenisha Solis       [facility] day: 3  Location: -09/009-A Reason for admit: Osteoarthritis of right hip, unspecified osteoarthritis type [M16.11]  Primary osteoarthritis of right hip [M16.11]   Procedure:    1/30 Right total hip replacement by Dr Rodriguez    Barriers to Discharge: POD 2, for HD today before discharge. Creatinine 5,7  PCP: Axel Evans APRN  Readmission Risk Score: 17%  Patient Goals/Plan/Treatment Preferences: home with  later today; ESRD pt on HD Mon, Wed, Fri at [facility] chair time 1430,  transports. Home with [facility]    Notified [place] [facility] clinic pt will resume HD on Monday 2/5/24.          Tenisha Solis requires a bedside commode due to being confined to one level of the home and there are no toilet facilities on that level, and is physically incapable of utilizing regular toilet facilities. Current body weight: Weight - Scale: 54 kg (119 lb 0.8 oz).

## 2024-02-02 NOTE — PLAN OF CARE
Problem: Discharge Planning  Goal: Discharge to home or other facility with appropriate resources  Outcome: Adequate for Discharge     Problem: Safety - Adult  Goal: Free from fall injury  Outcome: Adequate for Discharge     Problem: ABCDS Injury Assessment  Goal: Absence of physical injury  Outcome: Adequate for Discharge     Problem: Skin/Tissue Integrity  Goal: Absence of new skin breakdown  Description: 1.  Monitor for areas of redness and/or skin breakdown  2.  Assess vascular access sites hourly  3.  Every 4-6 hours minimum:  Change oxygen saturation probe site  4.  Every 4-6 hours:  If on nasal continuous positive airway pressure, respiratory therapy assess nares and determine need for appliance change or resting period.  Outcome: Adequate for Discharge     Problem: Pain  Goal: Verbalizes/displays adequate comfort level or baseline comfort level  Outcome: Adequate for Discharge

## 2024-02-02 NOTE — PLAN OF CARE
Problem: Pain  Goal: Verbalizes/displays adequate comfort level or baseline comfort level  2/1/2024 2056 by Brain Martinez RN  Outcome: Progressing  2/1/2024 1506 by Jordyn Combs RN  Outcome: Progressing  Flowsheets (Taken 2/1/2024 1506)  Verbalizes/displays adequate comfort level or baseline comfort level:   Assess pain using appropriate pain scale   Encourage patient to monitor pain and request assistance     Problem: Skin/Tissue Integrity  Goal: Absence of new skin breakdown  Description: 1.  Monitor for areas of redness and/or skin breakdown  2.  Assess vascular access sites hourly  3.  Every 4-6 hours minimum:  Change oxygen saturation probe site  4.  Every 4-6 hours:  If on nasal continuous positive airway pressure, respiratory therapy assess nares and determine need for appliance change or resting period.  2/1/2024 2056 by Brain Martinez RN  Outcome: Progressing  2/1/2024 1506 by Jordyn Combs RN  Outcome: Progressing     Problem: ABCDS Injury Assessment  Goal: Absence of physical injury  2/1/2024 2056 by Brain Martinez RN  Outcome: Progressing  2/1/2024 1506 by Jordyn Combs RN  Outcome: Progressing  Flowsheets (Taken 1/31/2024 1841 by Amy Moore, RN)  Absence of Physical Injury: Implement safety measures based on patient assessment

## 2024-02-02 NOTE — PROGRESS NOTES
[facility]   Nephrology progress note  2/2/2024      Pt Name:    Tenisha Solis  MRN:     [id number]     YOB: 1955  Admit Date:    1/30/2024  5:21 AM    Chief Complaint: Nephrology following for  ESRD    Subjective:  Patient was seen and examined this morning  No chest pain or shortness of breath  Feeling better    Objective:  24HR INTAKE/OUTPUT:    Intake/Output Summary (Last 24 hours) at 2/2/2024 1259  Last data filed at 2/2/2024 0800  Gross per 24 hour   Intake 1320 ml   Output --   Net 1320 ml           I/O last 3 completed shifts:  In: 1170 [P.O.:1170]  Out: 0   I/O this shift:  In: 200 [P.O.:200]  Out: -    Admission weight: 58.8 kg (129 lb 9.6 oz)  Wt Readings from Last 3 Encounters:   02/02/24 55 kg (121 lb 4.1 oz)   01/23/24 60.1 kg (132 lb 6.4 oz)   01/12/24 58.1 kg (128 lb)        Vitals :   Vitals:    02/01/24 2022 02/02/24 0333 02/02/24 0943 02/02/24 1135   BP: (!) 111/58 (!) 118/59 133/63 (!) 155/70   Pulse: 56 55 63 67   Resp: 16 18 16 17   Temp: 97.5 °F (36.4 °C) 97.4 °F (36.3 °C) 98.2 °F (36.8 °C) 97.2 °F (36.2 °C)   TempSrc: Oral Oral Oral    SpO2: 97% 99% 98% 98%   Weight:    55 kg (121 lb 4.1 oz)   Height:           Physical examination  General Appearance: alert and cooperative with exam, appears comfortable, no distress  Mouth/Throat: Oral mucosa moist  Neck: No JVD  Lungs: no use of accessory muscles  GI: soft, non-tender, no guarding    Medications:  Infusion:    sodium chloride       Meds:    epoetin mary kay-epbx  6,000 Units IntraVENous Once per day on Mon Wed Fri    ceFAZolin  1,000 mg IntraVENous Q24H    cinacalcet  30 mg Oral BID    cloNIDine  0.2 mg Oral TID    levothyroxine  100 mcg Oral Daily    minoxidil  2.5 mg Oral BID    NIFEdipine  60 mg Oral BID    sodium chloride flush  5-40 mL IntraVENous 2 times per day    acetaminophen  650 mg Oral Q6H    polyethylene glycol  17 g Oral Daily    aspirin  325 mg Oral BID     Meds prn: HYDROcodone 5 mg -

## 2024-02-02 NOTE — PROGRESS NOTES
[facility]  PHYSICAL THERAPY MISSED TREATMENT NOTE  [facility]K    Date: 2024  Patient Name: Tenisha Solis        MRN: [id number]   : 1955  (68 y.o.)  Gender: female   Referring Practitioner: MONSE Rodriguez MD  Diagnosis: Osteoarthritis of Rt hip.         REASON FOR MISSED TREATMENT:  Missed Treat.      RN Jordyn approved therapy session. Patient lying in bed and reports wanting to complete therapy session after Dialysis. Patient demonstrating no pain at rest and states fear of sitting in dialysis chair with increased hip pain d/t therapy treatment. Will attempt later today if time permits.

## 2024-02-02 NOTE — FLOWSHEET NOTE
02/02/24 1510   Vital Signs   BP (!) 157/72   Temp 97.1 °F (36.2 °C)   Pulse 63   Respirations 16   Weight - Scale 54 kg (119 lb 0.8 oz)   Weight Method Bed scale   Percent Weight Change -1.82   Post-Hemodialysis Assessment   Post-Treatment Procedures Blood returned;Access bleeding time < 10 minutes   Machine Disinfection Process Acid/Vinegar Clean;Heat Disinfect;Exterior Machine Disinfection   Rinseback Volume (ml) 400 ml   Blood Volume Processed (Liters) 72 L   Dialyzer Clearance Lightly streaked   Duration of Treatment (minutes) 210 minutes   Heparin Amount Administered During Treatment (mL) 0 mL   Hemodialysis Intake (ml) 300 ml   Hemodialysis Output (ml) 1300 ml   NET Removed (ml) 1000     Stable 3 hour 15 minute treatment complete. ended 15 minutes early.  Removed 1 liter of fluid as per order. Tolerated fluid removal well. Pressure held to needle sites times ten minutes each. Dressing clean, dry and intact. Report given to primary RN. Treatment record printed for scanning into EMR.

## 2024-02-02 NOTE — PROGRESS NOTES
[facility]  OCCUPATIONAL THERAPY MISSED TREATMENT NOTE  [facility] 7K  7K-09/009-A      Date: 2024  Patient Name: Tenisha Solis        CSN: [id number]   : 1955  (68 y.o.)  Gender: female   Referring Practitioner: Tono Rodriguez MD  Diagnosis: osteoarthritis of right hip, unspecified OA type         REASON FOR MISSED TREATMENT: Patient Off Floor for Dialysis will attempt next available time.

## 2024-02-05 ENCOUNTER — CLINICAL DOCUMENTATION ONLY (OUTPATIENT)
Facility: CLINIC | Age: 69
End: 2024-02-05

## 2024-02-06 ENCOUNTER — CLINICAL DOCUMENTATION ONLY (OUTPATIENT)
Facility: CLINIC | Age: 69
End: 2024-02-06

## 2024-04-25 ENCOUNTER — OFFICE VISIT (OUTPATIENT)
Age: 69
End: 2024-04-25
Payer: MEDICARE

## 2024-04-25 VITALS
WEIGHT: 133.4 LBS | SYSTOLIC BLOOD PRESSURE: 132 MMHG | HEIGHT: 65 IN | DIASTOLIC BLOOD PRESSURE: 78 MMHG | HEART RATE: 79 BPM | BODY MASS INDEX: 22.23 KG/M2

## 2024-04-25 DIAGNOSIS — E03.9 ACQUIRED HYPOTHYROIDISM: ICD-10-CM

## 2024-04-25 DIAGNOSIS — E21.3 HYPERPARATHYROIDISM (HCC): Primary | ICD-10-CM

## 2024-04-25 DIAGNOSIS — E83.52 HYPERCALCEMIA: ICD-10-CM

## 2024-04-25 PROCEDURE — 1123F ACP DISCUSS/DSCN MKR DOCD: CPT | Performed by: INTERNAL MEDICINE

## 2024-04-25 PROCEDURE — 99214 OFFICE O/P EST MOD 30 MIN: CPT | Performed by: INTERNAL MEDICINE

## 2024-04-25 PROCEDURE — G8427 DOCREV CUR MEDS BY ELIG CLIN: HCPCS | Performed by: INTERNAL MEDICINE

## 2024-04-25 PROCEDURE — 1036F TOBACCO NON-USER: CPT | Performed by: INTERNAL MEDICINE

## 2024-04-25 PROCEDURE — 3075F SYST BP GE 130 - 139MM HG: CPT | Performed by: INTERNAL MEDICINE

## 2024-04-25 PROCEDURE — 1090F PRES/ABSN URINE INCON ASSESS: CPT | Performed by: INTERNAL MEDICINE

## 2024-04-25 PROCEDURE — 3017F COLORECTAL CA SCREEN DOC REV: CPT | Performed by: INTERNAL MEDICINE

## 2024-04-25 PROCEDURE — G8420 CALC BMI NORM PARAMETERS: HCPCS | Performed by: INTERNAL MEDICINE

## 2024-04-25 PROCEDURE — G8399 PT W/DXA RESULTS DOCUMENT: HCPCS | Performed by: INTERNAL MEDICINE

## 2024-04-25 PROCEDURE — 3078F DIAST BP <80 MM HG: CPT | Performed by: INTERNAL MEDICINE

## 2024-04-25 NOTE — PROGRESS NOTES
[facility] [place] [facility]  [facility] ENDOCRINOLOGY  [address]  [place] [address]  Dept: [phone]  Loc: [phone]     Visit Date: 4/25/2024    Tenisha Solis is a 68 y.o. female who presents today for:  Chief Complaint   Patient presents with    Hyperparathyroidism              Subjective:      HPI     Tenisha Solis is a 68 y.o. , female who comes for Follow up for hypercalcemia.  Axel Evans APRN  Tenisha Solis is being seen for hyperparathyroidism associated with hypercalcemia. The patient has end-stage renal disease and is on hemodialysis.  She was recently hospitalized with severe hypercalcemia and was treated with zoledronic acid.  The patient is now taking Cinacalcet 60 mg daily.  We have previously discussed surgery with this patient.  The patient was actually seen by ENT for this issue.  She has elected conservative management.  She has done very well since the last visit with improvement of her energy level the patient tells me that she has had labs done at the dialysis center recently but I cannot find them.  The patient follows closely with nephrologist.  Please note that the patient is also being treated for hypothyroidism and she takes 100 mcg of Synthroid daily.  Labs from January came back normal.  Past Medical History:   Diagnosis Date    Anxiety     Diarrhea     Dryness of periwound skin     Gout, joint     Hemodialysis patient (HCC)     Hernia of abdominal cavity     Hypertension     Kidney disease     Mouth dryness       Past Surgical History:   Procedure Laterality Date    AV FISTULA CREATION Left     TOTAL HIP ARTHROPLASTY Right 1/30/2024    Right Total Hip Lateral Approach performed by Tono Rodriguez MD at [facility] OR    URETER STENT PLACEMENT  11/2019       Family History   Problem Relation Age of Onset    Cancer Father     No Known Problems Sister     No Known Problems Brother      Social History     Tobacco Use    Smoking

## 2024-05-10 LAB
T4 FREE: NORMAL
TSH SERPL DL<=0.05 MIU/L-ACNC: NORMAL M[IU]/L

## 2024-05-13 DIAGNOSIS — E03.9 ACQUIRED HYPOTHYROIDISM: ICD-10-CM

## 2024-05-18 ENCOUNTER — CLINICAL DOCUMENTATION (OUTPATIENT)
Age: 69
End: 2024-05-18

## 2024-05-18 DIAGNOSIS — E03.9 ACQUIRED HYPOTHYROIDISM: Primary | ICD-10-CM

## 2024-05-19 NOTE — RESULT ENCOUNTER NOTE
TSH is suppressed.  Change Synthroid to 1 tablet daily for 6 days and half tablet on the seventh day.  Get free T4 and TSH in a month.

## 2024-05-20 ENCOUNTER — TELEPHONE (OUTPATIENT)
Age: 69
End: 2024-05-20

## 2024-05-20 NOTE — TELEPHONE ENCOUNTER
Patient states PCP lowered synthroid dose to 88 mcg today with a recheck in 6 weeks she has not picked up new prescription yet. Would you like to continue with your suggestions?

## 2024-05-20 NOTE — TELEPHONE ENCOUNTER
----- Message from Robert Crow MD sent at 5/18/2024 11:33 PM EDT -----  TSH is suppressed.  Change Synthroid to 1 tablet daily for 6 days and half tablet on the seventh day.  Get free T4 and TSH in a month.

## 2024-05-24 ENCOUNTER — TELEPHONE (OUTPATIENT)
Dept: CARDIOLOGY CLINIC | Age: 69
End: 2024-05-24

## 2024-05-24 DIAGNOSIS — I35.0 AORTIC VALVE STENOSIS, ETIOLOGY OF CARDIAC VALVE DISEASE UNSPECIFIED: ICD-10-CM

## 2024-05-24 DIAGNOSIS — R93.1 ABNORMAL ECHOCARDIOGRAM: Primary | ICD-10-CM

## 2024-05-24 NOTE — TELEPHONE ENCOUNTER
Please review echo.  Possible TAVR - per [facility] Renal Team.

## 2024-05-27 NOTE — TELEPHONE ENCOUNTER
Tell her I discussed her case with the cardiologist At [facility]   She needs the AV done as it did get worse  She needs to decide if she wants us or them take care of it   We both could do this  If she wants us to do it she will need right and left cath and appointment with Jorge Luis ortiz I can talk to [facility] and refer her to them

## 2024-05-29 PROBLEM — R93.1 ABNORMAL ECHOCARDIOGRAM: Status: ACTIVE | Noted: 2024-05-24

## 2024-05-29 NOTE — TELEPHONE ENCOUNTER
Left and rt heart cath scheduled 06-06-24, arrive 1:00pm  Dialysis Monday, Wednesday, Friday  Call to pt, date, time and instructions reviewed with pt

## 2024-06-05 ENCOUNTER — PREP FOR PROCEDURE (OUTPATIENT)
Dept: CARDIOLOGY | Age: 69
End: 2024-06-05

## 2024-06-05 RX ORDER — ASPIRIN 325 MG
325 TABLET ORAL ONCE
Status: CANCELLED | OUTPATIENT
Start: 2024-06-05 | End: 2024-06-05

## 2024-06-05 RX ORDER — NITROGLYCERIN 0.4 MG/1
0.4 TABLET SUBLINGUAL EVERY 5 MIN PRN
Status: CANCELLED | OUTPATIENT
Start: 2024-06-05

## 2024-06-05 RX ORDER — SODIUM CHLORIDE 0.9 % (FLUSH) 0.9 %
5-40 SYRINGE (ML) INJECTION PRN
Status: CANCELLED | OUTPATIENT
Start: 2024-06-05

## 2024-06-05 RX ORDER — SODIUM CHLORIDE 0.9 % (FLUSH) 0.9 %
5-40 SYRINGE (ML) INJECTION EVERY 12 HOURS SCHEDULED
Status: CANCELLED | OUTPATIENT
Start: 2024-06-05

## 2024-06-05 RX ORDER — SODIUM CHLORIDE 9 MG/ML
INJECTION, SOLUTION INTRAVENOUS PRN
Status: CANCELLED | OUTPATIENT
Start: 2024-06-05

## 2024-06-05 RX ORDER — SODIUM CHLORIDE 9 MG/ML
INJECTION, SOLUTION INTRAVENOUS CONTINUOUS
Status: CANCELLED | OUTPATIENT
Start: 2024-06-05

## 2024-06-06 ENCOUNTER — HOSPITAL ENCOUNTER (OUTPATIENT)
Age: 69
Setting detail: OUTPATIENT SURGERY
Discharge: HOME | End: 2024-06-06
Attending: NUCLEAR MEDICINE | Admitting: NUCLEAR MEDICINE
Payer: MEDICARE

## 2024-06-06 VITALS
SYSTOLIC BLOOD PRESSURE: 131 MMHG | TEMPERATURE: 98.4 F | HEART RATE: 72 BPM | WEIGHT: 133 LBS | HEIGHT: 65 IN | DIASTOLIC BLOOD PRESSURE: 61 MMHG | RESPIRATION RATE: 18 BRPM | OXYGEN SATURATION: 98 % | BODY MASS INDEX: 22.16 KG/M2

## 2024-06-06 DIAGNOSIS — R93.1 ABNORMAL ECHOCARDIOGRAM: ICD-10-CM

## 2024-06-06 LAB
ABO: NORMAL
ANION GAP SERPL CALC-SCNC: 14 MEQ/L (ref 8–16)
ANTIBODY SCREEN: NORMAL
APTT PPP: 35.2 SECONDS (ref 22–38)
BUN SERPL-MCNC: 23 MG/DL (ref 7–22)
CALCIUM SERPL-MCNC: 9.4 MG/DL (ref 8.5–10.5)
CHLORIDE SERPL-SCNC: 97 MEQ/L (ref 98–111)
CHOLEST SERPL-MCNC: 239 MG/DL (ref 100–199)
CO2 SERPL-SCNC: 26 MEQ/L (ref 23–33)
CREAT SERPL-MCNC: 3.7 MG/DL (ref 0.4–1.2)
DEPRECATED RDW RBC AUTO: 51.4 FL (ref 35–45)
ECHO BSA: 1.66 M2
EKG ATRIAL RATE: 57 BPM
EKG P AXIS: 63 DEGREES
EKG P-R INTERVAL: 162 MS
EKG Q-T INTERVAL: 464 MS
EKG QRS DURATION: 88 MS
EKG QTC CALCULATION (BAZETT): 451 MS
EKG R AXIS: 53 DEGREES
EKG T AXIS: 73 DEGREES
EKG VENTRICULAR RATE: 57 BPM
ERYTHROCYTE [DISTWIDTH] IN BLOOD BY AUTOMATED COUNT: 15.5 % (ref 11.5–14.5)
GFR SERPL CREATININE-BSD FRML MDRD: 13 ML/MIN/1.73M2
GLUCOSE SERPL-MCNC: 90 MG/DL (ref 70–108)
HCT VFR BLD AUTO: 37.1 % (ref 37–47)
HDLC SERPL-MCNC: 78 MG/DL
HGB BLD-MCNC: 11.7 GM/DL (ref 12–16)
INR PPP: 0.88 (ref 0.85–1.13)
LDLC SERPL CALC-MCNC: 129 MG/DL
MCH RBC QN AUTO: 28.8 PG (ref 26–33)
MCHC RBC AUTO-ENTMCNC: 31.5 GM/DL (ref 32.2–35.5)
MCV RBC AUTO: 91.4 FL (ref 81–99)
PLATELET # BLD AUTO: 276 THOU/MM3 (ref 130–400)
PMV BLD AUTO: 9.7 FL (ref 9.4–12.4)
POTASSIUM SERPL-SCNC: 4.5 MEQ/L (ref 3.5–5.2)
RBC # BLD AUTO: 4.06 MILL/MM3 (ref 4.2–5.4)
RH FACTOR: NORMAL
SODIUM SERPL-SCNC: 137 MEQ/L (ref 135–145)
TRIGL SERPL-MCNC: 159 MG/DL (ref 0–199)
WBC # BLD AUTO: 5.4 THOU/MM3 (ref 4.8–10.8)

## 2024-06-06 PROCEDURE — 6360000004 HC RX CONTRAST MEDICATION: Performed by: NUCLEAR MEDICINE

## 2024-06-06 PROCEDURE — 99152 MOD SED SAME PHYS/QHP 5/>YRS: CPT | Performed by: NUCLEAR MEDICINE

## 2024-06-06 PROCEDURE — 82810 BLOOD GASES O2 SAT ONLY: CPT

## 2024-06-06 PROCEDURE — 93005 ELECTROCARDIOGRAM TRACING: CPT | Performed by: PHYSICIAN ASSISTANT

## 2024-06-06 PROCEDURE — 80061 LIPID PANEL: CPT

## 2024-06-06 PROCEDURE — 85027 COMPLETE CBC AUTOMATED: CPT

## 2024-06-06 PROCEDURE — 93453 R&L HRT CATH W/VENTRICLGRPHY: CPT | Performed by: NUCLEAR MEDICINE

## 2024-06-06 PROCEDURE — 7100000010 HC PHASE II RECOVERY - FIRST 15 MIN: Performed by: NUCLEAR MEDICINE

## 2024-06-06 PROCEDURE — 86900 BLOOD TYPING SEROLOGIC ABO: CPT

## 2024-06-06 PROCEDURE — 2500000003 HC RX 250 WO HCPCS: Performed by: NUCLEAR MEDICINE

## 2024-06-06 PROCEDURE — C1894 INTRO/SHEATH, NON-LASER: HCPCS | Performed by: NUCLEAR MEDICINE

## 2024-06-06 PROCEDURE — 86850 RBC ANTIBODY SCREEN: CPT

## 2024-06-06 PROCEDURE — 6360000002 HC RX W HCPCS: Performed by: NUCLEAR MEDICINE

## 2024-06-06 PROCEDURE — 2709999900 HC NON-CHARGEABLE SUPPLY: Performed by: NUCLEAR MEDICINE

## 2024-06-06 PROCEDURE — 93456 R HRT CORONARY ARTERY ANGIO: CPT | Performed by: NUCLEAR MEDICINE

## 2024-06-06 PROCEDURE — 7100000011 HC PHASE II RECOVERY - ADDTL 15 MIN: Performed by: NUCLEAR MEDICINE

## 2024-06-06 PROCEDURE — 85610 PROTHROMBIN TIME: CPT

## 2024-06-06 PROCEDURE — 86901 BLOOD TYPING SEROLOGIC RH(D): CPT

## 2024-06-06 PROCEDURE — C1769 GUIDE WIRE: HCPCS | Performed by: NUCLEAR MEDICINE

## 2024-06-06 PROCEDURE — 36415 COLL VENOUS BLD VENIPUNCTURE: CPT

## 2024-06-06 PROCEDURE — 85730 THROMBOPLASTIN TIME PARTIAL: CPT

## 2024-06-06 PROCEDURE — 80048 BASIC METABOLIC PNL TOTAL CA: CPT

## 2024-06-06 PROCEDURE — 2580000003 HC RX 258: Performed by: NUCLEAR MEDICINE

## 2024-06-06 PROCEDURE — 2580000003 HC RX 258: Performed by: PHYSICIAN ASSISTANT

## 2024-06-06 PROCEDURE — 93010 ELECTROCARDIOGRAM REPORT: CPT | Performed by: NUCLEAR MEDICINE

## 2024-06-06 RX ORDER — DIPHENHYDRAMINE HYDROCHLORIDE 50 MG/ML
25 INJECTION INTRAMUSCULAR; INTRAVENOUS ONCE
Status: COMPLETED | OUTPATIENT
Start: 2024-06-06 | End: 2024-06-06

## 2024-06-06 RX ORDER — ASPIRIN 81 MG/1
81 TABLET, CHEWABLE ORAL DAILY
COMMUNITY

## 2024-06-06 RX ORDER — SODIUM CHLORIDE 0.9 % (FLUSH) 0.9 %
5-40 SYRINGE (ML) INJECTION EVERY 12 HOURS SCHEDULED
Status: DISCONTINUED | OUTPATIENT
Start: 2024-06-06 | End: 2024-06-06 | Stop reason: HOSPADM

## 2024-06-06 RX ORDER — SODIUM CHLORIDE 9 MG/ML
INJECTION, SOLUTION INTRAVENOUS PRN
Status: DISCONTINUED | OUTPATIENT
Start: 2024-06-06 | End: 2024-06-06 | Stop reason: HOSPADM

## 2024-06-06 RX ORDER — FENTANYL CITRATE 50 UG/ML
INJECTION, SOLUTION INTRAMUSCULAR; INTRAVENOUS PRN
Status: DISCONTINUED | OUTPATIENT
Start: 2024-06-06 | End: 2024-06-06 | Stop reason: HOSPADM

## 2024-06-06 RX ORDER — ATROPINE SULFATE 0.4 MG/ML
0.5 INJECTION, SOLUTION INTRAVENOUS
Status: DISCONTINUED | OUTPATIENT
Start: 2024-06-06 | End: 2024-06-06 | Stop reason: HOSPADM

## 2024-06-06 RX ORDER — MIDAZOLAM HYDROCHLORIDE 1 MG/ML
INJECTION INTRAMUSCULAR; INTRAVENOUS PRN
Status: DISCONTINUED | OUTPATIENT
Start: 2024-06-06 | End: 2024-06-06 | Stop reason: HOSPADM

## 2024-06-06 RX ORDER — ASPIRIN 325 MG
325 TABLET ORAL ONCE
Status: DISCONTINUED | OUTPATIENT
Start: 2024-06-06 | End: 2024-06-06 | Stop reason: HOSPADM

## 2024-06-06 RX ORDER — SODIUM CHLORIDE 0.9 % (FLUSH) 0.9 %
5-40 SYRINGE (ML) INJECTION PRN
Status: DISCONTINUED | OUTPATIENT
Start: 2024-06-06 | End: 2024-06-06 | Stop reason: HOSPADM

## 2024-06-06 RX ORDER — CLONIDINE HYDROCHLORIDE 0.1 MG/1
0.1 TABLET ORAL 3 TIMES DAILY
COMMUNITY

## 2024-06-06 RX ORDER — ACETAMINOPHEN 325 MG/1
650 TABLET ORAL EVERY 4 HOURS PRN
Status: DISCONTINUED | OUTPATIENT
Start: 2024-06-06 | End: 2024-06-06 | Stop reason: HOSPADM

## 2024-06-06 RX ORDER — NITROGLYCERIN 0.4 MG/1
0.4 TABLET SUBLINGUAL EVERY 5 MIN PRN
Status: DISCONTINUED | OUTPATIENT
Start: 2024-06-06 | End: 2024-06-06 | Stop reason: HOSPADM

## 2024-06-06 RX ORDER — SODIUM CHLORIDE 9 MG/ML
INJECTION, SOLUTION INTRAVENOUS CONTINUOUS
Status: DISCONTINUED | OUTPATIENT
Start: 2024-06-06 | End: 2024-06-06 | Stop reason: HOSPADM

## 2024-06-06 RX ADMIN — SODIUM CHLORIDE: 9 INJECTION, SOLUTION INTRAVENOUS at 13:31

## 2024-06-06 RX ADMIN — WATER 80 MG: 1 INJECTION INTRAMUSCULAR; INTRAVENOUS; SUBCUTANEOUS at 15:11

## 2024-06-06 RX ADMIN — DIPHENHYDRAMINE HYDROCHLORIDE 25 MG: 50 INJECTION INTRAMUSCULAR; INTRAVENOUS at 15:16

## 2024-06-06 NOTE — DISCHARGE INSTRUCTIONS
GROIN APPROACH  1  Take it easy for 3 - 4 days and limit vigorous activity (contact sports) for 2 weeks after the procedure.  2  No driving for 3 days after the procedure.  3  No lifting of 5 lbs. Or more for 1 week after procedure (this is a half-gallon of milk).   4  You can shower after 24 hours. Remove the dressing in the shower.   5  Apply a clean band aid over the incision for 5 days after the procedure.   6  No creams, ointments, or powders near the site for 2 weeks after the procedure.   7  No bath tubs, swimming, or hot tubs for 1 week after the procedure.   8  Gently clean the site daily using soap and water while standing in the shower. Dry thoroughly.  9  Watch for signs of infection - redness, increased pain, elevated temperature, poor healing of the incision, fever, or chills.   10  If there is bleeding from the incision, apply pressure to it and call 911.                Coronary Angiogram: What to Expect at Home  Your Recovery     A coronary angiogram is a test to examine the large blood vessels of your heart (coronary arteries). The doctor inserted a thin, flexible tube (catheter) into a blood vessel in your groin or wrist.  Your groin or wrist may have a bruise and feel sore for a few days after the procedure. You can do light activities around the house. But do not do anything strenuous until your doctor says it is okay. This may be for several days.  This care sheet gives you a general idea about how long it will take for you to recover. But each person recovers at a different pace. Follow the steps below to feel better as quickly as possible.  How can you care for yourself at home?  Activity    If the doctor gave you a sedative:  For 24 hours, don't do anything that requires attention to detail, such as going to work, making important decisions, or signing any legal documents. It takes time for the medicine's effects to completely wear off.  For your safety, do not drive or operate any machinery

## 2024-06-06 NOTE — H&P
[facility]  Sedation/Analgesia History & Physical    Pt Name: Tenisha Solis  Account number: [id number]  MRN: [id number]  YOB: 1955  Provider Performing Procedure: Cristian Gomez MD MD Merged with [facility]  Primary Care Physician: Axel Evans APRN  Date: 6/6/2024    PRE-PROCEDURE    Code Status: FULL CODE  Brief History/Pre-Procedure Diagnosis:   AS for TAVR      Consent: : I have discussed with the patient risks, benefits, and alternatives (and relevant risks, benefits, and side effects related to alternatives or not receiving care), and likelihood of the success.   The patient and/or representative appear to understand and agree to proceed.  The discussion encompasses risks, benefits, and side effects related to the alternatives and the risks related to not receiving the proposed care, treatment, and services.         MEDICAL HISTORY  []ASHD/ANGINA/MI/CHF   [x]Hypertension  []Diabetes  []Hyperlipidemia  []Smoking  []Family Hx of ASHD  []Additional information:       has a past medical history of Anxiety, Diarrhea, Dryness of periwound skin, Gout, joint, Hemodialysis patient (HCC), Hernia of abdominal cavity, Hypertension, Kidney disease, and Mouth dryness.    SURGICAL HISTORY   has a past surgical history that includes Ureter stent placement (11/2019); AV fistula creation (Left); and Total hip arthroplasty (Right, 1/30/2024).  Additional information:       ALLERGIES   Allergies as of 05/29/2024 - Fully Reviewed 04/25/2024   Allergen Reaction Noted    Clopidogrel  05/12/2021    Dye [iodides] Swelling and Rash 06/17/2020     Additional information:       MEDICATIONS   Aspirin  [x] 81 mg  [] 325 mg  [] None  Antiplatelet drug therapy use last 5 days  []No []Yes  Coumadin Use Last 5 Days []No []Yes  Other anticoagulant use last 5 days  []No []Yes  No current facility-administered medications for this encounter.    Current Outpatient Medications:     aspirin 325 MG EC tablet, Take 1 tablet

## 2024-06-06 NOTE — PROGRESS NOTES
1300  Pt admitted to  2E02 ambulatory for left and right heart cath.  Pt NPO. Patient accompanied by spouse.   Vital signs obtained.  Assessment and data collection initiated.   Oriented to room.   Policies and procedures for 2E explained   All questions answered with no further questions at this time.   Fall prevention and safety precautions discussed with patient.   Care plan reviewed with patient.  Patient verbalizes understanding of the plan of care and contribute to goal setting.

## 2024-06-07 NOTE — FLOWSHEET NOTE
2000  Out of bed and ambulated   2110 discharged per wc with personal items  Has cell phone, home pills,clothes with her

## 2024-07-01 ENCOUNTER — TELEPHONE (OUTPATIENT)
Dept: CARDIOLOGY CLINIC | Age: 69
End: 2024-07-01

## 2024-07-01 NOTE — TELEPHONE ENCOUNTER
Patient sees Dr Kang on 7/9/24    Pre-TAVR workup:    Referring Provider: Crsitian Gomez MD    Primary Cardiologist: Cristian Gomez MD    History: anxiety, hemodialysis patient, HTN, CKD, hyperparathyroidism    ECHO: Obtained on 5/23/24 with the below results:      EKG: Obtained on 6/6/24 resulting in Sinus Bradycardia     Cardiac Catheterization:  Obtained on 6/6/24 with the below results:        CHF appointment: needs referral    Dental Clearance: teeth vs dentures?    Labs: 6/6/24        Renal Ultrasound: 6/6/24-- renal cyst discussed below

## 2024-07-09 ENCOUNTER — TELEPHONE (OUTPATIENT)
Dept: CARDIOLOGY CLINIC | Age: 69
End: 2024-07-09

## 2024-07-09 ENCOUNTER — OFFICE VISIT (OUTPATIENT)
Dept: CARDIOLOGY CLINIC | Age: 69
End: 2024-07-09
Payer: MEDICARE

## 2024-07-09 VITALS
SYSTOLIC BLOOD PRESSURE: 130 MMHG | HEART RATE: 60 BPM | HEIGHT: 65 IN | BODY MASS INDEX: 22.23 KG/M2 | WEIGHT: 133.4 LBS | DIASTOLIC BLOOD PRESSURE: 68 MMHG

## 2024-07-09 DIAGNOSIS — R42 DIZZINESS: ICD-10-CM

## 2024-07-09 DIAGNOSIS — I35.0 AORTIC VALVE STENOSIS, ETIOLOGY OF CARDIAC VALVE DISEASE UNSPECIFIED: Primary | ICD-10-CM

## 2024-07-09 PROCEDURE — 1090F PRES/ABSN URINE INCON ASSESS: CPT | Performed by: INTERNAL MEDICINE

## 2024-07-09 PROCEDURE — G8427 DOCREV CUR MEDS BY ELIG CLIN: HCPCS | Performed by: INTERNAL MEDICINE

## 2024-07-09 PROCEDURE — 3017F COLORECTAL CA SCREEN DOC REV: CPT | Performed by: INTERNAL MEDICINE

## 2024-07-09 PROCEDURE — 3078F DIAST BP <80 MM HG: CPT | Performed by: INTERNAL MEDICINE

## 2024-07-09 PROCEDURE — G8399 PT W/DXA RESULTS DOCUMENT: HCPCS | Performed by: INTERNAL MEDICINE

## 2024-07-09 PROCEDURE — 99215 OFFICE O/P EST HI 40 MIN: CPT | Performed by: INTERNAL MEDICINE

## 2024-07-09 PROCEDURE — G8420 CALC BMI NORM PARAMETERS: HCPCS | Performed by: INTERNAL MEDICINE

## 2024-07-09 PROCEDURE — 1123F ACP DISCUSS/DSCN MKR DOCD: CPT | Performed by: INTERNAL MEDICINE

## 2024-07-09 PROCEDURE — 3075F SYST BP GE 130 - 139MM HG: CPT | Performed by: INTERNAL MEDICINE

## 2024-07-09 PROCEDURE — 1036F TOBACCO NON-USER: CPT | Performed by: INTERNAL MEDICINE

## 2024-07-09 NOTE — PROGRESS NOTES
myself, family, patient, and structural heart coordinators.  The family and patient were explained the risks/benefits/alternatives of the procedure, how the procedure works, the work-up, post-procedural care, and all of the possible complications of the procedure, including, but not limited to vascular injury, debilitating stroke, need for permanent pacemaker, and death.  The patient/family would like to pursue TAVR at our facility and we will work towards this goal.         The patient and family understand that should there be any findings or issues that arise during the evaluation that would preclude TAVR, that this will need to be evaluated prior to proceeding with a percutaneous approach.  Further, a unified heart team approach will be performed prior to proceeding with TAVR which includes the patient's primary care givers, cardiologist, and the entire structural heart team (interventionalist, CT surgeons, structural heart NP).       The patient and family appeared to understand everything, all of their questions were answered to their satisfaction and they are agreeable to proceed with further evaluation for TAVR.       Thank you for allowing us to participate in the care of this patient.  Please do not hesitate to call us with questions.       Disposition:  No follow-ups on file.           Electronically signed by Ha Kang MD   7/9/2024 at 1:16 PM EDT

## 2024-07-09 NOTE — TELEPHONE ENCOUNTER
Called and spoke with [facility].  Pt run time typically 2:30.  CTAs to be scheduled prior to dialysis on a M W or F. Will update [facility] when scheduled.

## 2024-07-09 NOTE — TELEPHONE ENCOUNTER
Orders received from Dr. Kang to obtain a CV/CT Surgery appointment, TAVR CTA, Carotid US, Cardiac Catheterization, Dental Clearance and CHF appointment for optimization.    CT/CV Surgery appointment scheduled on 7/9/24 at 1130.  CTA scheduled on ___ at ___  Carotid US scheduled on __ at ___  Cardiac Catheterization completed with Dr. Bell on 6/6/24.  CHF appointment scheduled on7/23/24 at 1000.  Dental Appointment recently with Divina Scott D.D.S. Dental clearance has been sent to their office.    PreTAVR [id number] completed and scanned into chart.     Dr. Kang please agree. Patient is also allergic to contrast dye.  What would you like ordered for CTAs?     Dr. Arceo, patient needs to have CTA chest/abodmen/pelvis.  She is dialysis MWF.  Any other recommendations for renal protection?     Tara, can you please schedule this patient according to Dr. Arceo's preferences?

## 2024-07-10 NOTE — TELEPHONE ENCOUNTER
I spoke with Leslie at [facility].  I have updated her on plans for CTAs 7/19 at 1pm. Confirmed dialysis immediately afterwards at 2:30pm.  Called and spoke with Tenisha to update her.  All questions answered.

## 2024-07-12 RX ORDER — DIPHENHYDRAMINE HCL 25 MG
50 CAPSULE ORAL ONCE
Qty: 2 CAPSULE | Refills: 0 | Status: SHIPPED | OUTPATIENT
Start: 2024-07-12 | End: 2024-07-12

## 2024-07-12 RX ORDER — PREDNISONE 50 MG/1
50 TABLET ORAL DAILY
Qty: 3 TABLET | Refills: 0 | Status: SHIPPED | OUTPATIENT
Start: 2024-07-12 | End: 2024-07-15

## 2024-07-15 NOTE — PATIENT INSTRUCTIONS
You may receive a survey regarding the care you received during your visit.  Your input is valuable to us.  We encourage you to complete and return your survey.  We hope you will choose us in the future for your healthcare needs.    Thank you for choosing [facility]!    Your Medical Assistant Today:  Demetria ZARAGOZA   Your Provider for Today: Branden Manzano PA-C  Ph. [phone]    Have a Great Day!

## 2024-07-16 ENCOUNTER — OFFICE VISIT (OUTPATIENT)
Dept: CARDIOTHORACIC SURGERY | Age: 69
End: 2024-07-16
Payer: MEDICARE

## 2024-07-16 ENCOUNTER — OFFICE VISIT (OUTPATIENT)
Dept: CARDIOLOGY CLINIC | Age: 69
End: 2024-07-16
Payer: MEDICARE

## 2024-07-16 VITALS
SYSTOLIC BLOOD PRESSURE: 125 MMHG | HEIGHT: 65 IN | WEIGHT: 135.9 LBS | DIASTOLIC BLOOD PRESSURE: 67 MMHG | BODY MASS INDEX: 22.64 KG/M2 | OXYGEN SATURATION: 98 % | HEART RATE: 62 BPM

## 2024-07-16 VITALS
SYSTOLIC BLOOD PRESSURE: 125 MMHG | OXYGEN SATURATION: 98 % | HEART RATE: 62 BPM | WEIGHT: 135 LBS | BODY MASS INDEX: 22.49 KG/M2 | DIASTOLIC BLOOD PRESSURE: 67 MMHG | HEIGHT: 65 IN

## 2024-07-16 DIAGNOSIS — I35.0 NONRHEUMATIC AORTIC VALVE STENOSIS: Primary | ICD-10-CM

## 2024-07-16 DIAGNOSIS — I50.32 CHRONIC HEART FAILURE WITH PRESERVED EJECTION FRACTION (HCC): ICD-10-CM

## 2024-07-16 DIAGNOSIS — N18.6 ESRD (END STAGE RENAL DISEASE) ON DIALYSIS (HCC): ICD-10-CM

## 2024-07-16 DIAGNOSIS — I35.0 AORTIC VALVE STENOSIS, ETIOLOGY OF CARDIAC VALVE DISEASE UNSPECIFIED: Primary | ICD-10-CM

## 2024-07-16 DIAGNOSIS — Z99.2 ESRD (END STAGE RENAL DISEASE) ON DIALYSIS (HCC): ICD-10-CM

## 2024-07-16 PROCEDURE — 3078F DIAST BP <80 MM HG: CPT | Performed by: PHYSICIAN ASSISTANT

## 2024-07-16 PROCEDURE — 1090F PRES/ABSN URINE INCON ASSESS: CPT | Performed by: NURSE PRACTITIONER

## 2024-07-16 PROCEDURE — G8420 CALC BMI NORM PARAMETERS: HCPCS | Performed by: PHYSICIAN ASSISTANT

## 2024-07-16 PROCEDURE — 99214 OFFICE O/P EST MOD 30 MIN: CPT | Performed by: NURSE PRACTITIONER

## 2024-07-16 PROCEDURE — 3017F COLORECTAL CA SCREEN DOC REV: CPT | Performed by: PHYSICIAN ASSISTANT

## 2024-07-16 PROCEDURE — 1036F TOBACCO NON-USER: CPT | Performed by: PHYSICIAN ASSISTANT

## 2024-07-16 PROCEDURE — 1123F ACP DISCUSS/DSCN MKR DOCD: CPT | Performed by: PHYSICIAN ASSISTANT

## 2024-07-16 PROCEDURE — 99205 OFFICE O/P NEW HI 60 MIN: CPT | Performed by: PHYSICIAN ASSISTANT

## 2024-07-16 PROCEDURE — 3074F SYST BP LT 130 MM HG: CPT | Performed by: NURSE PRACTITIONER

## 2024-07-16 PROCEDURE — 3078F DIAST BP <80 MM HG: CPT | Performed by: NURSE PRACTITIONER

## 2024-07-16 PROCEDURE — 3017F COLORECTAL CA SCREEN DOC REV: CPT | Performed by: NURSE PRACTITIONER

## 2024-07-16 PROCEDURE — G8399 PT W/DXA RESULTS DOCUMENT: HCPCS | Performed by: NURSE PRACTITIONER

## 2024-07-16 PROCEDURE — 1036F TOBACCO NON-USER: CPT | Performed by: NURSE PRACTITIONER

## 2024-07-16 PROCEDURE — 1090F PRES/ABSN URINE INCON ASSESS: CPT | Performed by: PHYSICIAN ASSISTANT

## 2024-07-16 PROCEDURE — G8420 CALC BMI NORM PARAMETERS: HCPCS | Performed by: NURSE PRACTITIONER

## 2024-07-16 PROCEDURE — 3074F SYST BP LT 130 MM HG: CPT | Performed by: PHYSICIAN ASSISTANT

## 2024-07-16 PROCEDURE — G8427 DOCREV CUR MEDS BY ELIG CLIN: HCPCS | Performed by: PHYSICIAN ASSISTANT

## 2024-07-16 PROCEDURE — 1123F ACP DISCUSS/DSCN MKR DOCD: CPT | Performed by: NURSE PRACTITIONER

## 2024-07-16 PROCEDURE — G8427 DOCREV CUR MEDS BY ELIG CLIN: HCPCS | Performed by: NURSE PRACTITIONER

## 2024-07-16 PROCEDURE — G8399 PT W/DXA RESULTS DOCUMENT: HCPCS | Performed by: PHYSICIAN ASSISTANT

## 2024-07-16 RX ORDER — DIAZEPAM 5 MG/1
5 TABLET ORAL EVERY 6 HOURS PRN
COMMUNITY

## 2024-07-16 ASSESSMENT — ENCOUNTER SYMPTOMS
ABDOMINAL DISTENTION: 0
COUGH: 0
SHORTNESS OF BREATH: 0

## 2024-07-16 NOTE — PATIENT INSTRUCTIONS
You may receive a survey regarding the care you received during your visit.  Your input is valuable to us.  We encourage you to complete and return your survey.  We hope you will choose us in the future for your healthcare needs.    Your nurses today were Valentin.  Office hours:   Mon-Thurs 8-4:30  Friday 8-12  Phone: [phone]    Continue:  Continue current medications  Daily weights and record  Fluid restriction of 2 Liters per day  Limit sodium in diet to around 7894-4529 mg/day  Monitor BP  Activity as tolerated     Call the Heart Failure Clinic for any of the following symptoms:   Weight gain of 3 pounds in 1 day or 5 pounds in 1 week  Increased shortness of breath  Shortness of breath while laying down  Cough  Chest pain  Swelling in feet, ankles or legs  Bloating in abdomen  Fatigue

## 2024-07-16 NOTE — PROGRESS NOTES
Heart Failure Clinic       Visit Date: 7/16/2024  Cardiologist:  Dr. Kang  Primary Care Physician: Axel Hayes, MEGAN  Referred by: Jorge Luis    Tenisha Solis is a 68 y.o. female who presents today for:  Chief Complaint   Patient presents with    Cardiac Valve Problem       HPI:   Tenisha Solis is a 68 y.o. female who presents to the office for a patient visit in the heart failure clinic.  Accompanied by   2 sons    TYPE HF: HFpEF   Cause:   Valves:  Severe AS  Device: no  HX: ESRD (since 2020 - [facility], dialysis MWF), hip replacement (1/2024), colon resection d/t diverticulits (hx stoma), hx hernia (saw [facility], not repairable), hx cervical cancer (hxradiation, remission)      Was getting work up for kidney transplant and ECHO showing worsening AS - now pursuing TAVR prior to tx. (Son to donate)  Today: 135#  More fatigue, dizziness, swelling past few months  Not on diuretic - very little urine output  Sleeps in bed.  No orthopnea.       Past Medical History:   Diagnosis Date    Anxiety     Diarrhea     Dryness of periwound skin     Gout, joint     Hemodialysis patient (HCC)     Hernia of abdominal cavity     Hypertension     Kidney disease     Mouth dryness      Past Surgical History:   Procedure Laterality Date    AV FISTULA CREATION Left     CARDIAC PROCEDURE Bilateral 6/6/2024    Left and right heart cath / coronary angiography performed by Cristian Gomez MD at [facility] CARDIAC CATH LAB    TOTAL HIP ARTHROPLASTY Right 1/30/2024    Right Total Hip Lateral Approach performed by Tono Rodriguez MD at [facility] OR    URETER STENT PLACEMENT  11/2019     Family History   Problem Relation Age of Onset    Cancer Father     No Known Problems Sister     No Known Problems Brother      Social History     Tobacco Use    Smoking status: Never    Smokeless tobacco: Never   Substance Use Topics    Alcohol use: Not Currently     Current Outpatient Medications   Medication Sig Dispense Refill

## 2024-07-16 NOTE — PROGRESS NOTES
NEW PATIENT OFFICE VISIT CT/CV SURGERY    Patient's Name/Date of Birth: Tenisha Rooj / 1955 (68 y.o.)    PCP: Axel Evans APRN    Date: July 16, 2024     CC:     Chief Complaint   Patient presents with    New Patient     Ref TAVR  Evaluation     HPI:    Ms. Rojo is a 68 year old with hx of severe AS who presents to office with complaints of fatigue and WALSH with mild exertion for the past several months with increased progression over the past few months.  She has been on HD since 2020 because of ESRD.  She was in the workup for kidney transplant, but was found to have worsening valve function.  See Echo below.      Vital Signs: /67   Pulse 62   Ht 1.651 m (5' 5\")   Wt 61.6 kg (135 lb 14.4 oz)   SpO2 98%   BMI 22.61 kg/m²      Labs:   Trop:   Lab Results   Component Value Date    TROPONINT 0.068 (A) 07/16/2021     Imaging:  R/LHC: 6/6/24  Moderate non obstructive CAD  Normal right heart pressure  No LV done    Transthoracic echo (TTE) complete  Order: [id number]  Narrative    1 [facility] [address] [place] [address] [phone] (fax)    Echocardiogram-[facility] Name: TENISHA ROJO Study Date: 05/23/2024 02:02    PM MRN: [id number] Account #: [id number] B/P: 122 mmHg/64 mmHg HR: 59    bpm YOB: 1955 Location: [facility] Height: 65 in. Age: 68    year(s) Patient Room: Weight: 132 lb. Gender: Female Patient Status:    OutPt BSA: 1.66 m2 Indication: Pre-op evaluation Kidney Transplant,    Aortic Valve Stenosis Examination: Echocardiogram (Complete) Image    Quality: Good Patient Consent: Procedure explained to patient    Conclusions Left Ventricle: The left ventricle is normal size. Global    left ventricular systolic function is normal. The EF is 65 % visually.    Left ventricular wall thickness is mildly increased. No regional wall    motion abnormality. Unable to assess diastolic dysfunction. Concentric    left

## 2024-07-19 ENCOUNTER — HOSPITAL ENCOUNTER (OUTPATIENT)
Dept: INTERVENTIONAL RADIOLOGY/VASCULAR | Age: 69
End: 2024-07-19
Attending: INTERNAL MEDICINE
Payer: MEDICARE

## 2024-07-19 ENCOUNTER — HOSPITAL ENCOUNTER (OUTPATIENT)
Dept: CT IMAGING | Age: 69
Discharge: HOME | End: 2024-07-19
Attending: INTERNAL MEDICINE
Payer: MEDICARE

## 2024-07-19 DIAGNOSIS — I35.0 AORTIC VALVE STENOSIS, ETIOLOGY OF CARDIAC VALVE DISEASE UNSPECIFIED: ICD-10-CM

## 2024-07-19 DIAGNOSIS — R42 DIZZINESS: ICD-10-CM

## 2024-07-19 PROCEDURE — 6360000004 HC RX CONTRAST MEDICATION: Performed by: INTERNAL MEDICINE

## 2024-07-19 PROCEDURE — 93880 EXTRACRANIAL BILAT STUDY: CPT

## 2024-07-19 PROCEDURE — 74174 CTA ABD&PLVS W/CONTRAST: CPT

## 2024-07-19 PROCEDURE — 71275 CT ANGIOGRAPHY CHEST: CPT

## 2024-07-19 RX ADMIN — IOPAMIDOL 125 ML: 755 INJECTION, SOLUTION INTRAVENOUS at 12:29

## 2024-07-23 NOTE — TELEPHONE ENCOUNTER
Bibi can you review  She's a preTAVR patient  Did not complain of anything in the abdomen, found on tavr CTA  Thanks

## 2024-07-24 ENCOUNTER — TELEPHONE (OUTPATIENT)
Dept: CARDIOLOGY CLINIC | Age: 69
End: 2024-07-24

## 2024-07-24 DIAGNOSIS — I35.0 SEVERE AORTIC STENOSIS: ICD-10-CM

## 2024-07-24 DIAGNOSIS — I35.0 NONRHEUMATIC AORTIC VALVE STENOSIS: Primary | ICD-10-CM

## 2024-07-24 NOTE — TELEPHONE ENCOUNTER
Orders received from Dr. Kang to schedule the patient for a TAVR procedure and have the patient hold the follow medications the morning of the TAVR procedure: Valium and Clonidine.    TAVR: 24 at 1300 with an arrival of 2000 on 24.  Discharge home with  Matt.    Valve Rep Saravanan notified with [employer]  Pacer Rep not needed.    Post TAVR instructions per Dr. Kang: have the patient be seen in the Structural Heart Clinic 7 days post TAVR, obtain a CBC, BMP and ECHO prior to the 30 day post TAVR follow up appointment.    7 day post TAVR appointment:24 at 0945  CHF Clinic follow up appointment 24 at 1000  CBC/BMP/ECHO:24 at 1015  30 day post TAVR appointment:24 at 1130    Primary Cardiologist: Dr. Bell    Spoke with Dr. Arceo regarding patient's dialysis schedule.  He would like patient to be admitted the night before with plans for dialysis the morning before TAVR.  He is aware TAVR is scheduled for 1pm.     Dr. Kang, please agree.     Luana, can you please check prior auth?    Sulma, patient will be coming in the night before (24) at 8pm with plans for dialysis prior to TAVR. No prehydration per Dr. Arceo.    She does have allergies to contrast dye.  Can we order oral protocol for this?    Prednisone 50m/29 0000,   0600 and  1200  And Benadryl 50m/29 1200

## 2024-07-25 PROBLEM — I35.0 SEVERE AORTIC STENOSIS: Status: ACTIVE | Noted: 2024-07-24

## 2024-07-25 NOTE — TELEPHONE ENCOUNTER
All instructions reviewed with patient.  All questions answered.  Instructions emailed per pt request.

## 2024-07-26 ENCOUNTER — PREP FOR PROCEDURE (OUTPATIENT)
Dept: CARDIOLOGY | Age: 69
End: 2024-07-26

## 2024-07-26 RX ORDER — SODIUM CHLORIDE 0.9 % (FLUSH) 0.9 %
5-40 SYRINGE (ML) INJECTION EVERY 12 HOURS SCHEDULED
Status: CANCELLED | OUTPATIENT
Start: 2024-07-26

## 2024-07-26 RX ORDER — SODIUM CHLORIDE 9 MG/ML
INJECTION, SOLUTION INTRAVENOUS PRN
Status: CANCELLED | OUTPATIENT
Start: 2024-07-26

## 2024-07-26 RX ORDER — SODIUM CHLORIDE 0.9 % (FLUSH) 0.9 %
5-40 SYRINGE (ML) INJECTION PRN
Status: CANCELLED | OUTPATIENT
Start: 2024-07-26

## 2024-07-26 RX ORDER — SODIUM CHLORIDE 9 MG/ML
INJECTION, SOLUTION INTRAVENOUS CONTINUOUS
Status: CANCELLED | OUTPATIENT
Start: 2024-07-26

## 2024-07-28 ENCOUNTER — HOSPITAL ENCOUNTER (INPATIENT)
Age: 69
LOS: 1 days | Discharge: HOME | DRG: 266 | End: 2024-07-30
Attending: INTERNAL MEDICINE | Admitting: INTERNAL MEDICINE
Payer: MEDICARE

## 2024-07-28 DIAGNOSIS — I44.7 LEFT BUNDLE BRANCH BLOCK: ICD-10-CM

## 2024-07-28 DIAGNOSIS — I35.0 SEVERE AORTIC STENOSIS: ICD-10-CM

## 2024-07-28 DIAGNOSIS — Z95.2 HISTORY OF TRANSCATHETER AORTIC VALVE REPLACEMENT (TAVR): Primary | ICD-10-CM

## 2024-07-28 DIAGNOSIS — I44.30 ATRIOVENTRICULAR BLOCK: ICD-10-CM

## 2024-07-28 PROBLEM — N28.9 RENAL INSUFFICIENCY: Status: ACTIVE | Noted: 2024-07-28

## 2024-07-28 PROCEDURE — 2580000003 HC RX 258: Performed by: STUDENT IN AN ORGANIZED HEALTH CARE EDUCATION/TRAINING PROGRAM

## 2024-07-28 PROCEDURE — 6370000000 HC RX 637 (ALT 250 FOR IP): Performed by: INTERNAL MEDICINE

## 2024-07-28 RX ORDER — SODIUM CHLORIDE 9 MG/ML
INJECTION, SOLUTION INTRAVENOUS PRN
Status: DISCONTINUED | OUTPATIENT
Start: 2024-07-28 | End: 2024-07-30 | Stop reason: HOSPADM

## 2024-07-28 RX ORDER — PREDNISONE 50 MG/1
50 TABLET ORAL ONCE
Status: CANCELLED | OUTPATIENT
Start: 2024-07-29

## 2024-07-28 RX ORDER — SODIUM CHLORIDE 9 MG/ML
INJECTION, SOLUTION INTRAVENOUS CONTINUOUS
Status: DISCONTINUED | OUTPATIENT
Start: 2024-07-28 | End: 2024-07-29 | Stop reason: SDUPTHER

## 2024-07-28 RX ORDER — PREDNISONE 20 MG/1
50 TABLET ORAL ONCE
Status: COMPLETED | OUTPATIENT
Start: 2024-07-29 | End: 2024-07-29

## 2024-07-28 RX ORDER — DIPHENHYDRAMINE HCL 25 MG
50 TABLET ORAL ONCE
Status: CANCELLED | OUTPATIENT
Start: 2024-07-29

## 2024-07-28 RX ORDER — TEMAZEPAM 15 MG/1
30 CAPSULE ORAL NIGHTLY PRN
Status: DISCONTINUED | OUTPATIENT
Start: 2024-07-28 | End: 2024-07-30 | Stop reason: HOSPADM

## 2024-07-28 RX ADMIN — SODIUM CHLORIDE: 9 INJECTION, SOLUTION INTRAVENOUS at 21:43

## 2024-07-28 RX ADMIN — TEMAZEPAM 30 MG: 15 CAPSULE ORAL at 23:10

## 2024-07-29 PROBLEM — E87.20 METABOLIC ACIDOSIS: Status: ACTIVE | Noted: 2024-07-29

## 2024-07-29 PROBLEM — Z95.2 HISTORY OF TRANSCATHETER AORTIC VALVE REPLACEMENT (TAVR): Status: ACTIVE | Noted: 2024-07-29

## 2024-07-29 LAB
ABO: NORMAL
ACTIVATED CLOTTING TIME: 305 SECONDS (ref 1–150)
ANION GAP SERPL CALC-SCNC: 19 MEQ/L (ref 8–16)
ANTIBODY SCREEN: NORMAL
APTT PPP: 32.8 SECONDS (ref 22–38)
BUN SERPL-MCNC: 64 MG/DL (ref 7–22)
CALCIUM SERPL-MCNC: 8.1 MG/DL (ref 8.5–10.5)
CHLORIDE SERPL-SCNC: 100 MEQ/L (ref 98–111)
CO2 SERPL-SCNC: 19 MEQ/L (ref 23–33)
CREAT SERPL-MCNC: 7.2 MG/DL (ref 0.4–1.2)
DEPRECATED RDW RBC AUTO: 51.7 FL (ref 35–45)
EKG ATRIAL RATE: 58 BPM
EKG ATRIAL RATE: 75 BPM
EKG P AXIS: 10 DEGREES
EKG P AXIS: 104 DEGREES
EKG P-R INTERVAL: 148 MS
EKG P-R INTERVAL: 262 MS
EKG Q-T INTERVAL: 416 MS
EKG Q-T INTERVAL: 460 MS
EKG Q-T INTERVAL: 482 MS
EKG QRS DURATION: 148 MS
EKG QRS DURATION: 154 MS
EKG QRS DURATION: 86 MS
EKG QTC CALCULATION (BAZETT): 473 MS
EKG QTC CALCULATION (BAZETT): 513 MS
EKG QTC CALCULATION (BAZETT): 549 MS
EKG R AXIS: -15 DEGREES
EKG R AXIS: -49 DEGREES
EKG R AXIS: 119 DEGREES
EKG T AXIS: 115 DEGREES
EKG T AXIS: 2 DEGREES
EKG T AXIS: 91 DEGREES
EKG VENTRICULAR RATE: 105 BPM
EKG VENTRICULAR RATE: 58 BPM
EKG VENTRICULAR RATE: 75 BPM
ERYTHROCYTE [DISTWIDTH] IN BLOOD BY AUTOMATED COUNT: 15.2 % (ref 11.5–14.5)
GFR SERPL CREATININE-BSD FRML MDRD: 6 ML/MIN/1.73M2
GLUCOSE SERPL-MCNC: 99 MG/DL (ref 70–108)
HCT VFR BLD AUTO: 34.6 % (ref 37–47)
HGB BLD-MCNC: 10.6 GM/DL (ref 12–16)
INR PPP: 0.86 (ref 0.85–1.13)
MCH RBC QN AUTO: 28.8 PG (ref 26–33)
MCHC RBC AUTO-ENTMCNC: 30.6 GM/DL (ref 32.2–35.5)
MCV RBC AUTO: 94 FL (ref 81–99)
NT-PROBNP SERPL IA-MCNC: ABNORMAL PG/ML (ref 0–124)
PLATELET # BLD AUTO: 214 THOU/MM3 (ref 130–400)
PMV BLD AUTO: 10 FL (ref 9.4–12.4)
POTASSIUM SERPL-SCNC: 4.4 MEQ/L (ref 3.5–5.2)
RBC # BLD AUTO: 3.68 MILL/MM3 (ref 4.2–5.4)
RH FACTOR: NORMAL
SODIUM SERPL-SCNC: 138 MEQ/L (ref 135–145)
WBC # BLD AUTO: 6.6 THOU/MM3 (ref 4.8–10.8)

## 2024-07-29 PROCEDURE — C1894 INTRO/SHEATH, NON-LASER: HCPCS | Performed by: INTERNAL MEDICINE

## 2024-07-29 PROCEDURE — 80048 BASIC METABOLIC PNL TOTAL CA: CPT

## 2024-07-29 PROCEDURE — 99152 MOD SED SAME PHYS/QHP 5/>YRS: CPT | Performed by: INTERNAL MEDICINE

## 2024-07-29 PROCEDURE — 99153 MOD SED SAME PHYS/QHP EA: CPT | Performed by: INTERNAL MEDICINE

## 2024-07-29 PROCEDURE — 2709999900 HC NON-CHARGEABLE SUPPLY: Performed by: INTERNAL MEDICINE

## 2024-07-29 PROCEDURE — 93005 ELECTROCARDIOGRAM TRACING: CPT | Performed by: INTERNAL MEDICINE

## 2024-07-29 PROCEDURE — 02RF38Z REPLACEMENT OF AORTIC VALVE WITH ZOOPLASTIC TISSUE, PERCUTANEOUS APPROACH: ICD-10-PCS | Performed by: INTERNAL MEDICINE

## 2024-07-29 PROCEDURE — C1725 CATH, TRANSLUMIN NON-LASER: HCPCS | Performed by: INTERNAL MEDICINE

## 2024-07-29 PROCEDURE — 90935 HEMODIALYSIS ONE EVALUATION: CPT | Performed by: INTERNAL MEDICINE

## 2024-07-29 PROCEDURE — 6360000002 HC RX W HCPCS

## 2024-07-29 PROCEDURE — 2580000003 HC RX 258: Performed by: INTERNAL MEDICINE

## 2024-07-29 PROCEDURE — C1760 CLOSURE DEV, VASC: HCPCS | Performed by: INTERNAL MEDICINE

## 2024-07-29 PROCEDURE — 2500000003 HC RX 250 WO HCPCS: Performed by: INTERNAL MEDICINE

## 2024-07-29 PROCEDURE — 93005 ELECTROCARDIOGRAM TRACING: CPT | Performed by: STUDENT IN AN ORGANIZED HEALTH CARE EDUCATION/TRAINING PROGRAM

## 2024-07-29 PROCEDURE — 6370000000 HC RX 637 (ALT 250 FOR IP): Performed by: NURSE PRACTITIONER

## 2024-07-29 PROCEDURE — C1769 GUIDE WIRE: HCPCS | Performed by: INTERNAL MEDICINE

## 2024-07-29 PROCEDURE — 86850 RBC ANTIBODY SCREEN: CPT

## 2024-07-29 PROCEDURE — 33361 REPLACE AORTIC VALVE PERQ: CPT | Performed by: INTERNAL MEDICINE

## 2024-07-29 PROCEDURE — 86923 COMPATIBILITY TEST ELECTRIC: CPT

## 2024-07-29 PROCEDURE — 6370000000 HC RX 637 (ALT 250 FOR IP): Performed by: INTERNAL MEDICINE

## 2024-07-29 PROCEDURE — 83880 ASSAY OF NATRIURETIC PEPTIDE: CPT

## 2024-07-29 PROCEDURE — 85347 COAGULATION TIME ACTIVATED: CPT

## 2024-07-29 PROCEDURE — C1889 IMPLANT/INSERT DEVICE, NOC: HCPCS | Performed by: INTERNAL MEDICINE

## 2024-07-29 PROCEDURE — 36415 COLL VENOUS BLD VENIPUNCTURE: CPT

## 2024-07-29 PROCEDURE — 85730 THROMBOPLASTIN TIME PARTIAL: CPT

## 2024-07-29 PROCEDURE — 90935 HEMODIALYSIS ONE EVALUATION: CPT

## 2024-07-29 PROCEDURE — 6360000002 HC RX W HCPCS: Performed by: STUDENT IN AN ORGANIZED HEALTH CARE EDUCATION/TRAINING PROGRAM

## 2024-07-29 PROCEDURE — 6360000002 HC RX W HCPCS: Performed by: INTERNAL MEDICINE

## 2024-07-29 PROCEDURE — 2580000003 HC RX 258: Performed by: STUDENT IN AN ORGANIZED HEALTH CARE EDUCATION/TRAINING PROGRAM

## 2024-07-29 PROCEDURE — 2720000010 HC SURG SUPPLY STERILE: Performed by: INTERNAL MEDICINE

## 2024-07-29 PROCEDURE — 2140000000 HC CCU INTERMEDIATE R&B

## 2024-07-29 PROCEDURE — 86901 BLOOD TYPING SEROLOGIC RH(D): CPT

## 2024-07-29 PROCEDURE — 99222 1ST HOSP IP/OBS MODERATE 55: CPT | Performed by: INTERNAL MEDICINE

## 2024-07-29 PROCEDURE — 5A1D70Z PERFORMANCE OF URINARY FILTRATION, INTERMITTENT, LESS THAN 6 HOURS PER DAY: ICD-10-PCS | Performed by: INTERNAL MEDICINE

## 2024-07-29 PROCEDURE — 6360000004 HC RX CONTRAST MEDICATION: Performed by: INTERNAL MEDICINE

## 2024-07-29 PROCEDURE — G0269 OCCLUSIVE DEVICE IN VEIN ART: HCPCS | Performed by: INTERNAL MEDICINE

## 2024-07-29 PROCEDURE — 85610 PROTHROMBIN TIME: CPT

## 2024-07-29 PROCEDURE — 86900 BLOOD TYPING SEROLOGIC ABO: CPT

## 2024-07-29 PROCEDURE — 33361 REPLACE AORTIC VALVE PERQ: CPT | Performed by: THORACIC SURGERY (CARDIOTHORACIC VASCULAR SURGERY)

## 2024-07-29 PROCEDURE — 85027 COMPLETE CBC AUTOMATED: CPT

## 2024-07-29 DEVICE — VLV EVOLUTFX-29 COMM US
Type: IMPLANTABLE DEVICE | Status: FUNCTIONAL
Brand: EVOLUT™ FX

## 2024-07-29 RX ORDER — ACETAMINOPHEN 325 MG/1
650 TABLET ORAL EVERY 4 HOURS PRN
Status: DISCONTINUED | OUTPATIENT
Start: 2024-07-29 | End: 2024-07-30 | Stop reason: HOSPADM

## 2024-07-29 RX ORDER — ONDANSETRON 2 MG/ML
4 INJECTION INTRAMUSCULAR; INTRAVENOUS EVERY 6 HOURS PRN
Status: DISCONTINUED | OUTPATIENT
Start: 2024-07-29 | End: 2024-07-30 | Stop reason: HOSPADM

## 2024-07-29 RX ORDER — LEVOTHYROXINE SODIUM 88 UG/1
88 TABLET ORAL DAILY
Status: DISCONTINUED | OUTPATIENT
Start: 2024-07-29 | End: 2024-07-30 | Stop reason: HOSPADM

## 2024-07-29 RX ORDER — SODIUM CHLORIDE 9 MG/ML
INJECTION, SOLUTION INTRAVENOUS PRN
Status: DISCONTINUED | OUTPATIENT
Start: 2024-07-29 | End: 2024-07-30 | Stop reason: HOSPADM

## 2024-07-29 RX ORDER — NIFEDIPINE 60 MG/1
60 TABLET, EXTENDED RELEASE ORAL DAILY
Status: DISCONTINUED | OUTPATIENT
Start: 2024-07-29 | End: 2024-07-30

## 2024-07-29 RX ORDER — CLONIDINE HYDROCHLORIDE 0.1 MG/1
0.1 TABLET ORAL 3 TIMES DAILY
Status: DISCONTINUED | OUTPATIENT
Start: 2024-07-29 | End: 2024-07-30 | Stop reason: HOSPADM

## 2024-07-29 RX ORDER — FENTANYL CITRATE 50 UG/ML
INJECTION, SOLUTION INTRAMUSCULAR; INTRAVENOUS
Status: COMPLETED
Start: 2024-07-29 | End: 2024-07-29

## 2024-07-29 RX ORDER — ASPIRIN 81 MG/1
81 TABLET, CHEWABLE ORAL DAILY
Status: DISCONTINUED | OUTPATIENT
Start: 2024-07-29 | End: 2024-07-30 | Stop reason: HOSPADM

## 2024-07-29 RX ORDER — OXYCODONE HYDROCHLORIDE AND ACETAMINOPHEN 5; 325 MG/1; MG/1
2 TABLET ORAL EVERY 4 HOURS PRN
Status: DISCONTINUED | OUTPATIENT
Start: 2024-07-29 | End: 2024-07-29

## 2024-07-29 RX ORDER — MIDAZOLAM HYDROCHLORIDE 1 MG/ML
INJECTION INTRAMUSCULAR; INTRAVENOUS PRN
Status: DISCONTINUED | OUTPATIENT
Start: 2024-07-29 | End: 2024-07-29 | Stop reason: HOSPADM

## 2024-07-29 RX ORDER — FOLIC ACID/VIT B COMPLEX AND C 5 MG
1 TABLET ORAL DAILY
Status: DISCONTINUED | OUTPATIENT
Start: 2024-07-30 | End: 2024-07-30 | Stop reason: HOSPADM

## 2024-07-29 RX ORDER — ATROPINE SULFATE 0.4 MG/ML
0.5 INJECTION, SOLUTION INTRAVENOUS
Status: ACTIVE | OUTPATIENT
Start: 2024-07-29 | End: 2024-07-30

## 2024-07-29 RX ORDER — ONDANSETRON 2 MG/ML
INJECTION INTRAMUSCULAR; INTRAVENOUS PRN
Status: DISCONTINUED | OUTPATIENT
Start: 2024-07-29 | End: 2024-07-29 | Stop reason: HOSPADM

## 2024-07-29 RX ORDER — SODIUM CHLORIDE 0.9 % (FLUSH) 0.9 %
5-40 SYRINGE (ML) INJECTION PRN
Status: DISCONTINUED | OUTPATIENT
Start: 2024-07-29 | End: 2024-07-29 | Stop reason: SDUPTHER

## 2024-07-29 RX ORDER — OXYCODONE HYDROCHLORIDE AND ACETAMINOPHEN 5; 325 MG/1; MG/1
1 TABLET ORAL EVERY 4 HOURS PRN
Status: DISCONTINUED | OUTPATIENT
Start: 2024-07-29 | End: 2024-07-30 | Stop reason: HOSPADM

## 2024-07-29 RX ORDER — FENTANYL CITRATE 50 UG/ML
INJECTION, SOLUTION INTRAMUSCULAR; INTRAVENOUS PRN
Status: DISCONTINUED | OUTPATIENT
Start: 2024-07-29 | End: 2024-07-29 | Stop reason: HOSPADM

## 2024-07-29 RX ORDER — TEMAZEPAM 30 MG/1
30 CAPSULE ORAL NIGHTLY PRN
Status: DISCONTINUED | OUTPATIENT
Start: 2024-07-29 | End: 2024-07-29 | Stop reason: SDUPTHER

## 2024-07-29 RX ORDER — PROMETHAZINE HYDROCHLORIDE 25 MG/1
25 TABLET ORAL EVERY 6 HOURS PRN
Status: DISCONTINUED | OUTPATIENT
Start: 2024-07-29 | End: 2024-07-29

## 2024-07-29 RX ORDER — SODIUM CHLORIDE 0.9 % (FLUSH) 0.9 %
5-40 SYRINGE (ML) INJECTION EVERY 12 HOURS SCHEDULED
Status: DISCONTINUED | OUTPATIENT
Start: 2024-07-29 | End: 2024-07-30 | Stop reason: HOSPADM

## 2024-07-29 RX ORDER — ONDANSETRON 4 MG/1
4 TABLET, FILM COATED ORAL EVERY 8 HOURS PRN
Status: DISCONTINUED | OUTPATIENT
Start: 2024-07-29 | End: 2024-07-30 | Stop reason: HOSPADM

## 2024-07-29 RX ORDER — POLYETHYLENE GLYCOL 3350 17 G/17G
17 POWDER, FOR SOLUTION ORAL DAILY PRN
Status: DISCONTINUED | OUTPATIENT
Start: 2024-07-29 | End: 2024-07-30 | Stop reason: HOSPADM

## 2024-07-29 RX ORDER — HYDRALAZINE HYDROCHLORIDE 20 MG/ML
10 INJECTION INTRAMUSCULAR; INTRAVENOUS EVERY 10 MIN PRN
Status: COMPLETED | OUTPATIENT
Start: 2024-07-29 | End: 2024-07-30

## 2024-07-29 RX ORDER — OXYCODONE HYDROCHLORIDE AND ACETAMINOPHEN 5; 325 MG/1; MG/1
2 TABLET ORAL EVERY 4 HOURS PRN
Status: DISCONTINUED | OUTPATIENT
Start: 2024-07-29 | End: 2024-07-30 | Stop reason: HOSPADM

## 2024-07-29 RX ORDER — PREDNISONE 20 MG/1
50 TABLET ORAL ONCE
Status: COMPLETED | OUTPATIENT
Start: 2024-07-29 | End: 2024-07-29

## 2024-07-29 RX ORDER — PROMETHAZINE HYDROCHLORIDE 25 MG/ML
6.25 INJECTION, SOLUTION INTRAMUSCULAR; INTRAVENOUS EVERY 6 HOURS PRN
Status: DISCONTINUED | OUTPATIENT
Start: 2024-07-29 | End: 2024-07-30 | Stop reason: HOSPADM

## 2024-07-29 RX ORDER — MINOXIDIL 2.5 MG/1
2.5 TABLET ORAL 2 TIMES DAILY
Status: DISCONTINUED | OUTPATIENT
Start: 2024-07-29 | End: 2024-07-30 | Stop reason: HOSPADM

## 2024-07-29 RX ORDER — DIPHENHYDRAMINE HCL 25 MG
50 TABLET ORAL ONCE
Status: COMPLETED | OUTPATIENT
Start: 2024-07-29 | End: 2024-07-29

## 2024-07-29 RX ORDER — HEPARIN SODIUM 1000 [USP'U]/ML
INJECTION, SOLUTION INTRAVENOUS; SUBCUTANEOUS PRN
Status: DISCONTINUED | OUTPATIENT
Start: 2024-07-29 | End: 2024-07-29 | Stop reason: HOSPADM

## 2024-07-29 RX ORDER — DIAZEPAM 5 MG/1
5 TABLET ORAL EVERY 6 HOURS PRN
Status: DISCONTINUED | OUTPATIENT
Start: 2024-07-29 | End: 2024-07-30 | Stop reason: HOSPADM

## 2024-07-29 RX ORDER — CEFAZOLIN 2 G/1
2000 INJECTION, POWDER, FOR SOLUTION INTRAVENOUS
Status: DISCONTINUED | OUTPATIENT
Start: 2024-07-29 | End: 2024-07-29 | Stop reason: ALTCHOICE

## 2024-07-29 RX ORDER — OXYCODONE HYDROCHLORIDE AND ACETAMINOPHEN 5; 325 MG/1; MG/1
1 TABLET ORAL EVERY 4 HOURS PRN
Status: DISCONTINUED | OUTPATIENT
Start: 2024-07-29 | End: 2024-07-29

## 2024-07-29 RX ORDER — BISACODYL 5 MG/1
5 TABLET, DELAYED RELEASE ORAL DAILY PRN
Status: DISCONTINUED | OUTPATIENT
Start: 2024-07-29 | End: 2024-07-30 | Stop reason: HOSPADM

## 2024-07-29 RX ORDER — CINACALCET 30 MG/1
60 TABLET, FILM COATED ORAL DAILY
Status: DISCONTINUED | OUTPATIENT
Start: 2024-07-29 | End: 2024-07-30 | Stop reason: HOSPADM

## 2024-07-29 RX ORDER — LORAZEPAM 2 MG/ML
0.5 INJECTION INTRAMUSCULAR EVERY 6 HOURS PRN
Status: DISCONTINUED | OUTPATIENT
Start: 2024-07-29 | End: 2024-07-30 | Stop reason: HOSPADM

## 2024-07-29 RX ORDER — DIPHENHYDRAMINE HCL 25 MG
25 TABLET ORAL EVERY 6 HOURS PRN
Status: DISCONTINUED | OUTPATIENT
Start: 2024-07-29 | End: 2024-07-30 | Stop reason: HOSPADM

## 2024-07-29 RX ORDER — SODIUM CHLORIDE 9 MG/ML
INJECTION, SOLUTION INTRAVENOUS PRN
Status: DISCONTINUED | OUTPATIENT
Start: 2024-07-29 | End: 2024-07-29 | Stop reason: SDUPTHER

## 2024-07-29 RX ORDER — SODIUM CHLORIDE 0.9 % (FLUSH) 0.9 %
5-40 SYRINGE (ML) INJECTION EVERY 12 HOURS SCHEDULED
Status: DISCONTINUED | OUTPATIENT
Start: 2024-07-29 | End: 2024-07-29 | Stop reason: SDUPTHER

## 2024-07-29 RX ORDER — NITROGLYCERIN 20 MG/100ML
5-200 INJECTION INTRAVENOUS CONTINUOUS
Status: DISCONTINUED | OUTPATIENT
Start: 2024-07-29 | End: 2024-07-30 | Stop reason: HOSPADM

## 2024-07-29 RX ORDER — SODIUM CHLORIDE 9 MG/ML
INJECTION, SOLUTION INTRAVENOUS CONTINUOUS
Status: DISCONTINUED | OUTPATIENT
Start: 2024-07-29 | End: 2024-07-30

## 2024-07-29 RX ORDER — PROTAMINE SULFATE 10 MG/ML
INJECTION, SOLUTION INTRAVENOUS PRN
Status: DISCONTINUED | OUTPATIENT
Start: 2024-07-29 | End: 2024-07-29 | Stop reason: HOSPADM

## 2024-07-29 RX ORDER — SODIUM CHLORIDE 0.9 % (FLUSH) 0.9 %
5-40 SYRINGE (ML) INJECTION PRN
Status: DISCONTINUED | OUTPATIENT
Start: 2024-07-29 | End: 2024-07-30 | Stop reason: HOSPADM

## 2024-07-29 RX ADMIN — ONDANSETRON 4 MG: 2 INJECTION INTRAMUSCULAR; INTRAVENOUS at 22:29

## 2024-07-29 RX ADMIN — NIFEDIPINE 60 MG: 60 TABLET, EXTENDED RELEASE ORAL at 19:48

## 2024-07-29 RX ADMIN — SODIUM CHLORIDE, PRESERVATIVE FREE 10 ML: 5 INJECTION INTRAVENOUS at 21:41

## 2024-07-29 RX ADMIN — PREDNISONE 50 MG: 20 TABLET ORAL at 00:04

## 2024-07-29 RX ADMIN — PREDNISONE 50 MG: 20 TABLET ORAL at 06:31

## 2024-07-29 RX ADMIN — WATER 2000 MG: 1 INJECTION INTRAMUSCULAR; INTRAVENOUS; SUBCUTANEOUS at 14:32

## 2024-07-29 RX ADMIN — CLONIDINE HYDROCHLORIDE 0.1 MG: 0.1 TABLET ORAL at 19:47

## 2024-07-29 RX ADMIN — OXYCODONE HYDROCHLORIDE AND ACETAMINOPHEN 2 TABLET: 5; 325 TABLET ORAL at 17:50

## 2024-07-29 RX ADMIN — SODIUM CHLORIDE: 9 INJECTION, SOLUTION INTRAVENOUS at 21:12

## 2024-07-29 RX ADMIN — NITROGLYCERIN 20 MCG/MIN: 20 INJECTION INTRAVENOUS at 21:23

## 2024-07-29 RX ADMIN — TEMAZEPAM 30 MG: 15 CAPSULE ORAL at 22:45

## 2024-07-29 RX ADMIN — HYDRALAZINE HYDROCHLORIDE 10 MG: 20 INJECTION INTRAMUSCULAR; INTRAVENOUS at 18:00

## 2024-07-29 RX ADMIN — FENTANYL CITRATE 25 MCG: 50 INJECTION INTRAMUSCULAR; INTRAVENOUS at 16:44

## 2024-07-29 RX ADMIN — ASPIRIN 81 MG 81 MG: 81 TABLET ORAL at 19:52

## 2024-07-29 RX ADMIN — PROMETHAZINE HYDROCHLORIDE 6.25 MG: 25 INJECTION INTRAMUSCULAR; INTRAVENOUS at 18:41

## 2024-07-29 RX ADMIN — CINACALCET HYDROCHLORIDE 60 MG: 30 TABLET, FILM COATED ORAL at 19:52

## 2024-07-29 RX ADMIN — MINOXIDIL 2.5 MG: 2.5 TABLET ORAL at 19:48

## 2024-07-29 RX ADMIN — LEVOTHYROXINE SODIUM 88 MCG: 0.09 TABLET ORAL at 19:52

## 2024-07-29 RX ADMIN — DIPHENHYDRAMINE HYDROCHLORIDE 50 MG: 25 TABLET ORAL at 11:56

## 2024-07-29 RX ADMIN — LORAZEPAM 0.5 MG: 2 INJECTION INTRAMUSCULAR; INTRAVENOUS at 18:41

## 2024-07-29 RX ADMIN — SODIUM CHLORIDE: 9 INJECTION, SOLUTION INTRAVENOUS at 14:40

## 2024-07-29 RX ADMIN — PREDNISONE 50 MG: 20 TABLET ORAL at 11:56

## 2024-07-29 ASSESSMENT — PAIN DESCRIPTION - DESCRIPTORS: DESCRIPTORS: SHARP

## 2024-07-29 ASSESSMENT — PAIN SCALES - GENERAL
PAINLEVEL_OUTOF10: 10

## 2024-07-29 ASSESSMENT — PAIN DESCRIPTION - ORIENTATION: ORIENTATION: RIGHT;LEFT;LOWER

## 2024-07-29 ASSESSMENT — PAIN DESCRIPTION - LOCATION: LOCATION: LEG

## 2024-07-29 NOTE — CONSULTS
Patient seen and examined on dialysis  Tolerating hemodialysis treatment well  Ultrafiltration goal 3.5 kg  Vital stable blood pressure 140/61  Access working well  Discussed with HD RN  Discussed with patient  Full consult note to follow  
    No intake/output data recorded.  I/O this shift:  In: 400   Out: 3900   Admission weight: 64.5 kg (142 lb 3.2 oz)  Wt Readings from Last 3 Encounters:   07/29/24 61 kg (134 lb 7.7 oz)   07/16/24 61.2 kg (135 lb)   07/16/24 61.6 kg (135 lb 14.4 oz)     Body mass index is 22.38 kg/m².    Physical Examination:  VITALS:   Vitals:    07/29/24 0400 07/29/24 0715 07/29/24 1046 07/29/24 1109   BP: 125/74 (!) 140/61 (!) 117/52 (!) 126/59   Pulse: 57 64 65 60   Resp: 18 18 16 16   Temp: 98.2 °F (36.8 °C) 98 °F (36.7 °C) 98.5 °F (36.9 °C) 98.7 °F (37.1 °C)   TempSrc: Oral   Oral   SpO2: 96%   98%   Weight:  64.5 kg (142 lb 3.2 oz) 61 kg (134 lb 7.7 oz)      Weight:   Wt Readings from Last 3 Encounters:   07/29/24 61 kg (134 lb 7.7 oz)   07/16/24 61.2 kg (135 lb)   07/16/24 61.6 kg (135 lb 14.4 oz)     Constitutional and General Appearance: alert and cooperative with exam, appears comfortable, no distress, not diaphoretic  Eyes: no icteric sclera in left eye or right eye,  no pallor conjunctiva in left or right eye  Ears and Nose:  No active drainage from nose.   Oral: moist oral mucus membranes  Neck: No jugular venous distention  Lungs: Air entry B/L, no crackles or rales, no use of accessory muscles or labored breathing  Chest: No chest wall tenderness  Heart: regular rate, S1, S2  Extremities: no LE edema, no tenderness  GI: soft, non-tender, no guarding, no distention  Skin: no rash seen on exposed extremities, warm to touch  Musculo: moves all extremities   Neuro: no slurred speech, no facial drooping  Psychiatric: Normal mood and affect, Not agitated    Lab Data  CBC:   Recent Labs     07/29/24  0330   WBC 6.6   HGB 10.6*   HCT 34.6*        BMP:  Recent Labs     07/29/24  0330      K 4.4      CO2 19*   BUN 64*   CREATININE 7.2*   GLUCOSE 99   CALCIUM 8.1*         Impression and Plan:  ESRD on hemodialysis  Plan hemodialysis treatment today with ultrafiltration  We will plan ultrafiltration goal of

## 2024-07-29 NOTE — FLOWSHEET NOTE
07/29/24 0715 07/29/24 1046   Vital Signs   BP (!) 140/61 (!) 117/52   Temp 98 °F (36.7 °C) 98.5 °F (36.9 °C)   Pulse 64 65   Respirations 18 16   Weight - Scale 64.5 kg (142 lb 3.2 oz) 61 kg (134 lb 7.7 oz)   Weight Method Standing scale Bed scale   Percent Weight Change 0 -5.43   Post-Hemodialysis Assessment   Post-Treatment Procedures  --  Blood returned;Access bleeding time < 10 minutes   Machine Disinfection Process  --  Acid/Vinegar Clean;Heat Disinfect;Exterior Machine Disinfection   Blood Volume Processed (Liters)  --  67.2 L   Dialyzer Clearance  --  Lightly streaked   Duration of Treatment (minutes)  --  180 minutes   Heparin Amount Administered During Treatment (mL)  --  0 mL   Hemodialysis Intake (ml)  --  400 ml   Hemodialysis Output (ml)  --  3900 ml   NET Removed (ml)  --  3500   Tolerated Treatment  --  Good     Stable 3 hour TX completed. Removed 3.5 L of fluid. pt tolerated fluid removal well. Pressure held to each needle site x 10 min. Drsg clean, dry, and intact upon leaving unit. TX record printed for scanning into EMR. Report called to primary RN.

## 2024-07-29 NOTE — OP NOTE
DATE: 07/29/24    PATIENT: Tenisha Solis    MRN: [id number]     Acct: [id number]    PREOP DIAGNOSIS:   Severe aortic stenosis    Postop diagnosis:  Severe aortic stenosis    Procedure:  Transcatheter aortic valve replacement with a [employer] 29 mm Evolut FX prosthesis    OPERATORS:  Ha Kang MD; Logan Steven MD    ESTIMATED BLOOD LOSS: 50 ml    A pre-procedure discussion was conducted with the patient to confirm/exclude candidacy for open surgical salvage in case of procedure failure.    After informed consent was obtained from the patient, the patient was brought to the hybrid operating room and prepped in sterile fashion. Infiltration of the bilateral inguinal regions was accomplished with 2% lidocaine. Using micropuncture and modified Seldinger technique, a 6 Fr sheath was inserted into the right internal jugular vein. A 5 Fr pacemaker was inserted into position under fluoroscopic guidance into the right ventricle, with verification of appropriate pacing thresholds.    Using the same technique under fluoroscopic guidance, a 5 Fr sheath was inserted into the left common femoral artery.  Similarly, a 4 Fr sheath, followed after contrast verification by a 5 Fr sheath was inserted into the right common femoral artery. This was replaced with a 6 Fr J4 catheter over a guidewire, and a Supra Core wire was used to traverse the aortic arch.  We then performed standard preclose technique by deploying a Perclose in orthogonal fashion and leaving it incomplete. A 14 Fr sheath was inserted over the wire under guidance. The patient was heparinized to an ACT above 250 seconds.     A straight pigtail was placed via the left femoral artery and aortography was performed in a coplanar view to ensure alignment.  The aortic valve was crossed using a straight wire through an AL1 catheter. The guidewire was changed to a J-tipped wire, on which was inserted an angled pigtail.  Direct hemodynamic measurements were obtained. The

## 2024-07-29 NOTE — PLAN OF CARE
Problem: Discharge Planning  Goal: Discharge to home or other facility with appropriate resources  Outcome: Progressing  Flowsheets (Taken 7/28/2024 2023 by Kathy Ward, RN)  Discharge to home or other facility with appropriate resources:   Identify barriers to discharge with patient and caregiver   Identify discharge learning needs (meds, wound care, etc)   Refer to discharge planning if patient needs post-hospital services based on physician order or complex needs related to functional status, cognitive ability or social support system   Arrange for needed discharge resources and transportation as appropriate     Problem: Safety - Adult  Goal: Free from fall injury  Outcome: Progressing  Flowsheets (Taken 7/29/2024 1043)  Free From Fall Injury: Instruct family/caregiver on patient safety  Note: Bed locked & in low position, call light in reach, side-rails up x2, bed/chair alarm utilized, non-slip socks on when ambulating, reminded patient to use call light to call for assistance.      Problem: Cardiovascular - Adult  Goal: Maintains optimal cardiac output and hemodynamic stability  Outcome: Progressing  Flowsheets (Taken 7/29/2024 1043)  Maintains optimal cardiac output and hemodynamic stability: Monitor blood pressure and heart rate  Note: Ongoing assessment & interventions provided throughout shift.  Patient on continuous telemetry monitoring, heart tones, vitals & pulses checked at least 3 times per shift & PRN. Vitals within acceptable limits.  Peripheral pulses palpable.    Care plan reviewed with patient.  Patient verbalizes understanding of the care plan and contributed to goal setting.

## 2024-07-29 NOTE — H&P
[facility]  Sedation/Analgesia History & Physical    Pt Name: Tenisha Solis  Account number: [id number]  MRN: [id number]  YOB: 1955  Provider Performing Procedure: Ha Kang MD MD  Referring Provider: Ha Kang MD   Primary Care Physician: Axel Evans APRN  Date: 7/29/2024    PRE-PROCEDURE    Code Status: FULL CODE  Brief History/Pre-Procedure Diagnosis:   Severe AS  NYHA III CHF    Consent: : I have discussed with the patient risks, benefits, and alternatives (and relevant risks, benefits, and side effects related to alternatives or not receiving care), and likelihood of the success.   The patient and/or representative appear to understand and agree to proceed.  The discussion encompasses risks, benefits, and side effects related to the alternatives and the risks related to not receiving the proposed care, treatment, and services.     The indication, risks and benefits of the procedure and possible therapeutic consequences and alternatives were discussed with the patient. The patient was given the opportunity to ask questions and to have them answered to his/her satisfaction. Risks of the procedure include but are not limited to the following: Bleeding, hematoma including retroperitoneal hemmorhage, infection, pain and discomfort, injury to the aorta and other blood vessels, rhythm disturbance, low blood pressure, myocardial infarction, stroke, kidney damage/failure, myocardial perforation, allergic reactions to sedatives/contrast material, loss of pulse/vascular compromise requiring surgery, aneurysm/pseudoaneurysm formation, possible loss of a limb/hand/leg, needing blood transfusion, requiring emergent open heart surgery or emergent coronary intervention, the need for intubation/respiratory support, the requirement for defibrillation/cardioversion, and death. Alternatives to and omission of the suggested procedure were discussed. The patient had no further

## 2024-07-29 NOTE — PROCEDURES
PROCEDURE NOTE  Date: 7/29/2024   Name: Tenisha Solis  YOB: 1955    Procedures    12 lead EKG completed. Results handed to RN

## 2024-07-29 NOTE — PROCEDURES
PROCEDURE NOTE  Date: 7/29/2024   Name: Tenisha Solis  YOB: 1955    Procedures EKG completed, given to Nancy LIVINGSTON

## 2024-07-29 NOTE — CARE COORDINATION
Case Management Assessment Initial Evaluation    Date/Time of Evaluation: 7/29/2024 2:11 PM  Assessment Completed by: Parisa Garcia RN    If patient is discharged prior to next notation, then this note serves as note for discharge by case management.    Patient Name: Tenisha Solis                   YOB: 1955  Diagnosis: Renal insufficiency [N28.9]  Severe aortic stenosis [I35.0]                   Date / Time: 7/28/2024  7:58 PM  Location: [address] [place] [address]     Patient Admission Status: Inpatient   Readmission Risk Low 0-14, Mod 15-19), High > 20: Readmission Risk Score: 17.3    Current PCP: Axel Evans APRN  Health Care Decision Makers:     Additional Case Management Notes: Here for elective procedure, scheduled for TAVR today. OP in bed overnight for prehydration. Anticipate discharge tomorrow if medically ready and after ECHO is read by Dr. Kang.     Procedure:   7/29 TAVR procedure with Dr. Kang.   7/30 ECHO: to be done.     Patient Goals/Plan/Treatment Preferences: Spoke with pt. She lives at home with her  and an adult granddaughter. Pt is current with [facility] for OP HD, MWF at 1430. Denies needs currently.        07/29/24 1411   Service Assessment   Patient Orientation Alert and Oriented   Cognition Alert   History Provided By Patient   Primary Caregiver Self   Accompanied By/Relationship  and 2 sons   Support Systems Spouse/Significant Other;Children;Family Members   Patient's Healthcare Decision Maker is: Legal Next of Kin   PCP Verified by CM Yes   Last Visit to PCP Within last 3 months   Prior Functional Level Independent in ADLs/IADLs   Current Functional Level Assistance with the following:;Cooking;Housework;Shopping   Can patient return to prior living arrangement Yes   Ability to make needs known: Good   Family able to assist with home care needs: Yes   Would you like for me to discuss the discharge plan with any other family members/significant others, and if so,

## 2024-07-30 ENCOUNTER — HOSPITAL ENCOUNTER (INPATIENT)
Age: 69
Discharge: HOME | DRG: 266 | End: 2024-08-01
Attending: NURSE PRACTITIONER
Payer: MEDICARE

## 2024-07-30 ENCOUNTER — APPOINTMENT (OUTPATIENT)
Age: 69
DRG: 266 | End: 2024-07-30
Attending: INTERNAL MEDICINE
Payer: MEDICARE

## 2024-07-30 VITALS
RESPIRATION RATE: 19 BRPM | TEMPERATURE: 99.3 F | WEIGHT: 134.48 LBS | SYSTOLIC BLOOD PRESSURE: 130 MMHG | BODY MASS INDEX: 22.38 KG/M2 | HEART RATE: 65 BPM | OXYGEN SATURATION: 93 % | DIASTOLIC BLOOD PRESSURE: 69 MMHG

## 2024-07-30 DIAGNOSIS — I44.7 LEFT BUNDLE BRANCH BLOCK: ICD-10-CM

## 2024-07-30 DIAGNOSIS — I44.30 ATRIOVENTRICULAR BLOCK: ICD-10-CM

## 2024-07-30 DIAGNOSIS — Z95.2 HISTORY OF TRANSCATHETER AORTIC VALVE REPLACEMENT (TAVR): ICD-10-CM

## 2024-07-30 LAB
ANION GAP SERPL CALC-SCNC: 20 MEQ/L (ref 8–16)
APTT PPP: 28.5 SECONDS (ref 22–38)
BUN SERPL-MCNC: 37 MG/DL (ref 7–22)
CALCIUM SERPL-MCNC: 8.8 MG/DL (ref 8.5–10.5)
CHLORIDE SERPL-SCNC: 97 MEQ/L (ref 98–111)
CO2 SERPL-SCNC: 20 MEQ/L (ref 23–33)
CREAT SERPL-MCNC: 5 MG/DL (ref 0.4–1.2)
DEPRECATED RDW RBC AUTO: 51.6 FL (ref 35–45)
EKG Q-T INTERVAL: 408 MS
EKG QRS DURATION: 140 MS
EKG QTC CALCULATION (BAZETT): 490 MS
EKG R AXIS: -25 DEGREES
EKG T AXIS: 147 DEGREES
EKG VENTRICULAR RATE: 87 BPM
ERYTHROCYTE [DISTWIDTH] IN BLOOD BY AUTOMATED COUNT: 15.5 % (ref 11.5–14.5)
GFR SERPL CREATININE-BSD FRML MDRD: 9 ML/MIN/1.73M2
GLUCOSE SERPL-MCNC: 121 MG/DL (ref 70–108)
HCT VFR BLD AUTO: 35.1 % (ref 37–47)
HGB BLD-MCNC: 11.1 GM/DL (ref 12–16)
INR PPP: 0.88 (ref 0.85–1.13)
MCH RBC QN AUTO: 29.3 PG (ref 26–33)
MCHC RBC AUTO-ENTMCNC: 31.6 GM/DL (ref 32.2–35.5)
MCV RBC AUTO: 92.6 FL (ref 81–99)
PHOSPHATE SERPL-MCNC: 6.8 MG/DL (ref 2.4–4.7)
PLATELET # BLD AUTO: 230 THOU/MM3 (ref 130–400)
PMV BLD AUTO: 10.1 FL (ref 9.4–12.4)
POTASSIUM SERPL-SCNC: 4.3 MEQ/L (ref 3.5–5.2)
PTH-INTACT SERPL-MCNC: 1782 PG/ML (ref 15–65)
RBC # BLD AUTO: 3.79 MILL/MM3 (ref 4.2–5.4)
SODIUM SERPL-SCNC: 137 MEQ/L (ref 135–145)
WBC # BLD AUTO: 12.7 THOU/MM3 (ref 4.8–10.8)

## 2024-07-30 PROCEDURE — 83970 ASSAY OF PARATHORMONE: CPT

## 2024-07-30 PROCEDURE — 93270 REMOTE 30 DAY ECG REV/REPORT: CPT

## 2024-07-30 PROCEDURE — 93306 TTE W/DOPPLER COMPLETE: CPT

## 2024-07-30 PROCEDURE — 6370000000 HC RX 637 (ALT 250 FOR IP): Performed by: INTERNAL MEDICINE

## 2024-07-30 PROCEDURE — 84100 ASSAY OF PHOSPHORUS: CPT

## 2024-07-30 PROCEDURE — 99232 SBSQ HOSP IP/OBS MODERATE 35: CPT | Performed by: INTERNAL MEDICINE

## 2024-07-30 PROCEDURE — 80048 BASIC METABOLIC PNL TOTAL CA: CPT

## 2024-07-30 PROCEDURE — 85730 THROMBOPLASTIN TIME PARTIAL: CPT

## 2024-07-30 PROCEDURE — 93005 ELECTROCARDIOGRAM TRACING: CPT | Performed by: INTERNAL MEDICINE

## 2024-07-30 PROCEDURE — 2580000003 HC RX 258: Performed by: INTERNAL MEDICINE

## 2024-07-30 PROCEDURE — 85027 COMPLETE CBC AUTOMATED: CPT

## 2024-07-30 PROCEDURE — 36415 COLL VENOUS BLD VENIPUNCTURE: CPT

## 2024-07-30 PROCEDURE — 6360000002 HC RX W HCPCS: Performed by: INTERNAL MEDICINE

## 2024-07-30 PROCEDURE — 85610 PROTHROMBIN TIME: CPT

## 2024-07-30 RX ORDER — NIFEDIPINE 30 MG/1
30 TABLET, EXTENDED RELEASE ORAL ONCE
Status: DISCONTINUED | OUTPATIENT
Start: 2024-07-30 | End: 2024-07-30

## 2024-07-30 RX ORDER — NIFEDIPINE 30 MG/1
90 TABLET, EXTENDED RELEASE ORAL DAILY
Status: DISCONTINUED | OUTPATIENT
Start: 2024-07-31 | End: 2024-07-30

## 2024-07-30 RX ORDER — PROCHLORPERAZINE EDISYLATE 5 MG/ML
10 INJECTION INTRAMUSCULAR; INTRAVENOUS EVERY 6 HOURS PRN
Status: DISCONTINUED | OUTPATIENT
Start: 2024-07-30 | End: 2024-07-30 | Stop reason: HOSPADM

## 2024-07-30 RX ORDER — NIFEDIPINE 30 MG/1
60 TABLET, EXTENDED RELEASE ORAL DAILY
Status: DISCONTINUED | OUTPATIENT
Start: 2024-07-31 | End: 2024-07-30 | Stop reason: HOSPADM

## 2024-07-30 RX ADMIN — Medication 1 TABLET: at 09:21

## 2024-07-30 RX ADMIN — ASPIRIN 81 MG 81 MG: 81 TABLET ORAL at 09:21

## 2024-07-30 RX ADMIN — LORAZEPAM 0.5 MG: 2 INJECTION INTRAMUSCULAR; INTRAVENOUS at 02:28

## 2024-07-30 RX ADMIN — LEVOTHYROXINE SODIUM 88 MCG: 0.09 TABLET ORAL at 05:57

## 2024-07-30 RX ADMIN — PROMETHAZINE HYDROCHLORIDE 6.25 MG: 25 INJECTION INTRAMUSCULAR; INTRAVENOUS at 01:37

## 2024-07-30 RX ADMIN — MINOXIDIL 2.5 MG: 2.5 TABLET ORAL at 09:21

## 2024-07-30 RX ADMIN — SODIUM CHLORIDE, PRESERVATIVE FREE 10 ML: 5 INJECTION INTRAVENOUS at 09:22

## 2024-07-30 RX ADMIN — NITROGLYCERIN 80 MCG/MIN: 20 INJECTION INTRAVENOUS at 08:49

## 2024-07-30 RX ADMIN — HYDRALAZINE HYDROCHLORIDE 10 MG: 20 INJECTION INTRAMUSCULAR; INTRAVENOUS at 04:26

## 2024-07-30 RX ADMIN — NIFEDIPINE 60 MG: 60 TABLET, EXTENDED RELEASE ORAL at 09:21

## 2024-07-30 RX ADMIN — CINACALCET HYDROCHLORIDE 60 MG: 30 TABLET, FILM COATED ORAL at 09:21

## 2024-07-30 RX ADMIN — CLONIDINE HYDROCHLORIDE 0.1 MG: 0.1 TABLET ORAL at 13:51

## 2024-07-30 RX ADMIN — PROCHLORPERAZINE EDISYLATE 10 MG: 5 INJECTION INTRAMUSCULAR; INTRAVENOUS at 02:28

## 2024-07-30 RX ADMIN — CLONIDINE HYDROCHLORIDE 0.1 MG: 0.1 TABLET ORAL at 09:21

## 2024-07-30 NOTE — PLAN OF CARE
Problem: Discharge Planning  Goal: Discharge to home or other facility with appropriate resources  7/30/2024 1006 by Neetu Florez RN  Outcome: Progressing  Flowsheets (Taken 7/30/2024 1006)  Discharge to home or other facility with appropriate resources:   Identify barriers to discharge with patient and caregiver   Identify discharge learning needs (meds, wound care, etc)     Problem: Safety - Adult  Goal: Free from fall injury  7/30/2024 1006 by Neetu Florez RN  Outcome: Progressing  Flowsheets (Taken 7/30/2024 1006)  Free From Fall Injury: Instruct family/caregiver on patient safety  Note: Bed locked & in low position, call light in reach, side-rails up x2, bed/chair alarm utilized, non-slip socks on when ambulating, reminded patient to use call light to call for assistance.      Problem: Cardiovascular - Adult  Goal: Maintains optimal cardiac output and hemodynamic stability  7/30/2024 1006 by Neetu Florez RN  Outcome: Progressing  Flowsheets (Taken 7/30/2024 1006)  Maintains optimal cardiac output and hemodynamic stability:   Monitor blood pressure and heart rate   Assess for signs of decreased cardiac output   Administer fluid and/or volume expanders as ordered  Note: Ongoing assessment & interventions provided throughout shift.  Patient on continuous telemetry monitoring, heart tones, vitals & pulses checked at least 3 times per shift & PRN. Vitals within acceptable limits.  Peripheral pulses palpable.    Care plan reviewed with patient.  Patient verbalizes understanding of the care plan and contributed to goal setting.

## 2024-07-30 NOTE — PROGRESS NOTES
[facility]   Nephrology progress note  7/30/2024, 9:24 AM      Pt Name:    Tenisha Solis  MRN:     [id number]     YOB: 1955  Admit Date:    7/28/2024  7:58 PM    Chief Complaint: Nephrology following for ESRD    Subjective:  Patient was seen and examined this morning  No chest pain or shortness of breath  Feels okay    Objective:  24HR INTAKE/OUTPUT:    Intake/Output Summary (Last 24 hours) at 7/30/2024 0924  Last data filed at 7/30/2024 0517  Gross per 24 hour   Intake 400 ml   Output 3960 ml   Net -3560 ml         I/O last 3 completed shifts:  In: 400   Out: 3960 [Urine:50; Blood:10]  No intake/output data recorded.   Admission weight: 64.5 kg (142 lb 3.2 oz)  Wt Readings from Last 3 Encounters:   07/29/24 61 kg (134 lb 7.7 oz)   07/16/24 61.2 kg (135 lb)   07/16/24 61.6 kg (135 lb 14.4 oz)        Vitals :   Vitals:    07/30/24 0500 07/30/24 0558 07/30/24 0634 07/30/24 0918   BP: (!) 170/57 (!) 161/57 (!) 142/54 (!) 156/59   Pulse:    86   Resp:    19   Temp:    98.2 °F (36.8 °C)   TempSrc:    Oral   SpO2:    95%   Weight:           Physical examination  General Appearance: alert and cooperative with exam, appears comfortable, no distress  Mouth/Throat: Oral mucosa moist  Neck: No JVD  Lungs: Air entry B/L, no rales, no use of accessory muscles  Heart:  S1, S2 heard  GI: soft, non-tender, no guarding  Extremities: no LE edema    Medications:  Infusion:    sodium chloride      nitroGLYCERIN 80 mcg/min (07/30/24 0849)    sodium chloride       Meds:    sodium chloride flush  5-40 mL IntraVENous 2 times per day    NIFEdipine  60 mg Oral Daily    minoxidil  2.5 mg Oral BID    folbee plus  1 tablet Oral Daily    levothyroxine  88 mcg Oral Daily    cinacalcet  60 mg Oral Daily    aspirin  81 mg Oral Daily    cloNIDine  0.1 mg Oral TID     Meds prn: prochlorperazine, sodium chloride, sodium chloride flush, perflutren lipid microspheres, acetaminophen, atropine, ondansetron,

## 2024-07-30 NOTE — DISCHARGE INSTRUCTIONS
Post Transcatheter Aortic Valve Replacement (TAVR) Discharge Instructions    Your follow-up appointments and echocardiogram will be scheduled by Dr. Kang's office and their office staff and will call you with the date and time.  We are here for you! If you have any questions, please call:   Lexy SULLIVAN, [phone]    Do you have the help you need at home? Someone must be with you for the first 7 days or until you are seen in the office post TAVR.    Wear the event monitor as prescribed for the next 30 days. Call the office or seek evaluation with 911 if not feeling well.     ACTIVITY:  Weigh yourself every day in the morning after using the bathroom.    NO DRIVING UNTIL YOU RETURN TO THE DOCTOR'S OFFICE.  You may ride in a car.   Do not lift more than five to ten pounds for the first week (A gallon of milk is 7 lbs)  Get up and get dressed every day. Do not stay in bed. Set a daily routine. This is an important step in re-gaining your strength.  You may walk up and down a few stairs.   Walk as much as you can.  This will help facilitate the healing process.  Plan rest periods during the day.  Deep breathe and use your incentive spirometer 10 times every hour while you are awake.   Avoid work that increases muscle tension (straining with bowel movements, moving furniture)      WOUND CARE:  Remove Band-Aids after 48 hours of placement.  May shower once Band-Aids are removed. No bathtubs, pools, or hot tubs for the first week you are home.   Cleanse wounds with Hibiclens soap and water. Pat incision dry. Keep wounds open to air after Band-Aids are removed and keep dry.   A small amount of bloody or clear drainage is normal. Call if there is any extensive bruising, oozing or hematoma.  Place manual pressure over incision sites when coughing, sneezing, vomiting, or straining for a bowl movement.  Watch for signs of infections: redness, incision hot to the touch, fever greater than 101 degrees, swelling at

## 2024-07-30 NOTE — DISCHARGE SUMMARY
Structural Heart/Cardiology Discharge Summary       Name:  Tenisha Solis  YOB: 1955  Medical Record Number:  [id number]    Date of Admission:  2024  Date of Discharge:  2024    Admitting physician: Ha Kang MD  Discharge to: Home  Condition at d/c: Stable    Time spent on discharge: >60 minutes    Hospital Problem List:  Patient Active Problem List   Diagnosis    Malignant neoplasm of cervix (HCC)    Encounter for chemotherapy management    Hypertension    Chronic anemia    Chronic renal insufficiency, stage 4 (severe) (HCC)    Hypercalcemia    Hypovitaminosis D    Abnormal thyroid function test    Parathyroid adenoma    ESRD (end stage renal disease) on dialysis (HCC)    Hyperparathyroidism (HCC)    Primary osteoarthritis of right hip    Abnormal echocardiogram    Aortic valve stenosis    Severe aortic stenosis    Renal insufficiency    History of transcatheter aortic valve replacement (TAVR)    Metabolic acidosis     Diagnostics:   [id number]; SR with PACs      Echo: 2024; 24 hours post TAVR   Interpreting Physicians  Performing Staff   Ha Kang MD Tech/Nurse: Hortensia Cortez [facility]        Reason for Exam  Priority: Routine  Aortic valve, TAVR; tavr   Dx: History of transcatheter aortic valve replacement (TAVR) [Z95.2 (ICD-10-CM)]   PACS Images   Show images for Echo (TTE) complete (PRN contrast/bubble/strain/3D)  Interpretation Summary    Left Ventricle: Normal left ventricular systolic function with a visually estimated EF of 60 - 65%. Left ventricle size is normal. Normal wall thickness. Normal wall motion.    Aortic Valve: Appropriately positioned transcatheter bioprosthetic valve that is well-seated. AV mean gradient is 14 mmHg. No regurgitation. No paravalvular regurgitation. No stenosis. Normal prosthetic gradient. AV mean gradient is 14 mmHg. AV peak gradient is 25 mmHg. AV peak velocity is 2.5 m/s. AV area by continuity VTI is 1.9 cm2.    Mitral

## 2024-07-30 NOTE — CARE COORDINATION
7/30/24, 2:28 PM EDT    DISCHARGE ON GOING EVALUATION    Tenisha SEGAL [facility] day: 1  Location: [address] [place] [address]-A Reason for admit: Renal insufficiency [N28.9]  Severe aortic stenosis [I35.0]     Procedures:   7/29 TAVR procedure with Dr. Kang.   7/30 ECHO: pending.     Imaging since last note: None    Barriers to Discharge: Cardiology and Nephrology following. POD #1. BP elevated post procedure and NTG gtt started. /75 high. Now 146/68. NTG down to 5mcg. Planning dialysis tomorrow.     PCP: Axel Evans APRN  Readmission Risk Score: 17.1    Patient Goals/Plan/Treatment Preferences: From home with  and adult granddaughter. Current with OP HD at [facility] MWF at 1430. Monitor.

## 2024-07-30 NOTE — PROGRESS NOTES
AVS and post TAVR reviewed with patient and spouse. All questions and concerns addressed. TAVR sites soft and hematoma free. Patient aware of follow up appointments. Patient discharged in stable condition per wheelchair with tech.

## 2024-07-30 NOTE — PROCEDURES
PROCEDURE NOTE  Date: 7/30/2024   Name: Tenisha Solis  YOB: 1955    Procedures    12 lead EKG completed. Results handed to Freeman LIVINGSTON.

## 2024-07-30 NOTE — PLAN OF CARE
Problem: Discharge Planning  Goal: Discharge to home or other facility with appropriate resources  Outcome: Progressing  Flowsheets (Taken 7/30/2024 0507)  Discharge to home or other facility with appropriate resources:   Identify barriers to discharge with patient and caregiver   Identify discharge learning needs (meds, wound care, etc)     Problem: Safety - Adult  Goal: Free from fall injury  Outcome: Progressing  Note: Bed locked & in low position, call light in reach, side-rails up x2, bed/chair alarm utilized, non-slip socks on when ambulating, reminded patient to use call light to call for assistance.      Problem: Cardiovascular - Adult  Goal: Maintains optimal cardiac output and hemodynamic stability  Outcome: Progressing  Flowsheets (Taken 7/30/2024 0507)  Maintains optimal cardiac output and hemodynamic stability:   Monitor blood pressure and heart rate   Monitor urine output and notify Licensed Independent Practitioner for values outside of normal range   Assess for signs of decreased cardiac output     Problem: Cardiovascular - Adult  Goal: Absence of cardiac dysrhythmias or at baseline  Outcome: Progressing  Flowsheets (Taken 7/30/2024 0507)  Absence of cardiac dysrhythmias or at baseline:   Monitor cardiac rate and rhythm   Assess for signs of decreased cardiac output   Care plan reviewed with patient.  Patient verbalizes understanding of the care plan and contributed to goal setting.

## 2024-07-30 NOTE — PROGRESS NOTES
Inpatient Cardiac Rehabilitation Consult    Received consult for Phase II Cardiac Rehabilitation.  Patient needs cardiac rehab due to TAVR on 7/29/24.  Importance of Cardiac Rehab discussed with patient.  Reviewed cardiac rehab class times.  Patient questions answered.  Spoke to patient on 7/16/24. As requested, cardiac rehab referral will be sent to [facility].  Cardiac Rehab brochure given.

## 2024-07-31 ENCOUNTER — TELEPHONE (OUTPATIENT)
Dept: CARDIOLOGY CLINIC | Age: 69
End: 2024-07-31

## 2024-07-31 ENCOUNTER — COMMUNITY CARE MANAGEMENT (OUTPATIENT)
Facility: CLINIC | Age: 69
End: 2024-07-31

## 2024-07-31 DIAGNOSIS — Z95.2 S/P TAVR (TRANSCATHETER AORTIC VALVE REPLACEMENT): Primary | ICD-10-CM

## 2024-07-31 LAB
ECHO AV AREA PEAK VELOCITY: 1.8 CM2
ECHO AV AREA VTI: 1.9 CM2
ECHO AV MEAN GRADIENT: 14 MMHG
ECHO AV MEAN VELOCITY: 1.8 M/S
ECHO AV PEAK GRADIENT: 25 MMHG
ECHO AV PEAK VELOCITY: 2.5 M/S
ECHO AV VELOCITY RATIO: 0.64
ECHO AV VTI: 47.1 CM
ECHO EST RA PRESSURE: 5 MMHG
ECHO LA AREA 2C: 14.4 CM2
ECHO LA AREA 4C: 16.3 CM2
ECHO LA DIAMETER: 3.9 CM
ECHO LA MAJOR AXIS: 5 CM
ECHO LA MINOR AXIS: 4.1 CM
ECHO LA VOL BP: 45 ML (ref 22–52)
ECHO LA VOL MOD A2C: 41 ML (ref 22–52)
ECHO LA VOL MOD A4C: 41 ML (ref 22–52)
ECHO LV E' LATERAL VELOCITY: 8 CM/S
ECHO LV E' SEPTAL VELOCITY: 7 CM/S
ECHO LV FRACTIONAL SHORTENING: 38 % (ref 28–44)
ECHO LV INTERNAL DIMENSION DIASTOLIC: 5.6 CM (ref 3.9–5.3)
ECHO LV INTERNAL DIMENSION SYSTOLIC: 3.5 CM
ECHO LV ISOVOLUMETRIC RELAXATION TIME (IVRT): 48 MS
ECHO LV IVSD: 0.9 CM (ref 0.6–0.9)
ECHO LV MASS 2D: 178.1 G (ref 67–162)
ECHO LV POSTERIOR WALL DIASTOLIC: 0.8 CM (ref 0.6–0.9)
ECHO LV RELATIVE WALL THICKNESS RATIO: 0.29
ECHO LVOT AREA: 2.8 CM2
ECHO LVOT AV VTI INDEX: 0.67
ECHO LVOT DIAM: 1.9 CM
ECHO LVOT MEAN GRADIENT: 6 MMHG
ECHO LVOT PEAK GRADIENT: 10 MMHG
ECHO LVOT PEAK VELOCITY: 1.6 M/S
ECHO LVOT SV: 89 ML
ECHO LVOT VTI: 31.4 CM
ECHO MV A VELOCITY: 1.46 M/S
ECHO MV AREA VTI: 1.1 CM2
ECHO MV E DECELERATION TIME (DT): 313 MS
ECHO MV E VELOCITY: 2.17 M/S
ECHO MV E/A RATIO: 1.49
ECHO MV E/E' LATERAL: 27.13
ECHO MV E/E' RATIO (AVERAGED): 29.06
ECHO MV E/E' SEPTAL: 31
ECHO MV LVOT VTI INDEX: 2.47
ECHO MV MAX VELOCITY: 2.9 M/S
ECHO MV MEAN GRADIENT: 11 MMHG
ECHO MV MEAN VELOCITY: 1.5 M/S
ECHO MV PEAK GRADIENT: 32 MMHG
ECHO MV REGURGITANT PEAK GRADIENT: 190 MMHG
ECHO MV REGURGITANT PEAK VELOCITY: 6.9 M/S
ECHO MV VTI: 77.7 CM
ECHO PV MAX VELOCITY: 0.7 M/S
ECHO PV PEAK GRADIENT: 2 MMHG
ECHO RIGHT VENTRICULAR SYSTOLIC PRESSURE (RVSP): 64 MMHG
ECHO RV INTERNAL DIMENSION: 3 CM
ECHO RV TAPSE: 3 CM (ref 1.7–?)
ECHO TV E WAVE: 0.7 M/S
ECHO TV REGURGITANT MAX VELOCITY: 3.83 M/S
ECHO TV REGURGITANT PEAK GRADIENT: 59 MMHG

## 2024-07-31 PROCEDURE — 93306 TTE W/DOPPLER COMPLETE: CPT | Performed by: INTERNAL MEDICINE

## 2024-07-31 NOTE — PROGRESS NOTES
CC spoke with patient, no immediate needs or concerns at this time. CC will forward to [facility] as patient states no TCM needs at this time. CC-AFD

## 2024-07-31 NOTE — TELEPHONE ENCOUNTER
Spoke with patient post dialysis treatment.  States her HR is 80, /70.  States she feels fine but just a \"little sweaty\".  Offered appt with ANDRE Ozuna tomorrow.  Pt declined at this time.  Will call in the morning and see how she is feeling.

## 2024-07-31 NOTE — TELEPHONE ENCOUNTER
Orders received from Dr. Kang to schedule the patient for her 1 year post TAVR follow up appointment with an ECHO, CBC, and BMP.    Appointment is scheduled with Sulma for 8/5/25 at 1100.  ECHO scheduled for 7/29/25 at 1100.    Dr. Kang, please agree.

## 2024-08-01 NOTE — TELEPHONE ENCOUNTER
Patient states she is feeling well this morning and does not feel like she needs to be seen. Her blood pressure was in the teens last night and she feels fine.

## 2024-08-04 ENCOUNTER — HOSPITAL ENCOUNTER (INPATIENT)
Age: 69
LOS: 3 days | Discharge: HOME | DRG: 228 | End: 2024-08-07
Attending: INTERNAL MEDICINE
Payer: MEDICARE

## 2024-08-04 ENCOUNTER — TELEPHONE (OUTPATIENT)
Age: 69
End: 2024-08-04

## 2024-08-04 DIAGNOSIS — R55 SYNCOPE: ICD-10-CM

## 2024-08-04 LAB
EKG ATRIAL RATE: 52 BPM
EKG P AXIS: 19 DEGREES
EKG P-R INTERVAL: 302 MS
EKG Q-T INTERVAL: 510 MS
EKG QRS DURATION: 146 MS
EKG QTC CALCULATION (BAZETT): 474 MS
EKG R AXIS: -37 DEGREES
EKG T AXIS: 109 DEGREES
EKG VENTRICULAR RATE: 52 BPM

## 2024-08-04 PROCEDURE — 93005 ELECTROCARDIOGRAM TRACING: CPT

## 2024-08-04 PROCEDURE — 2140000000 HC CCU INTERMEDIATE R&B

## 2024-08-04 PROCEDURE — 93010 ELECTROCARDIOGRAM REPORT: CPT | Performed by: INTERNAL MEDICINE

## 2024-08-04 PROCEDURE — 99223 1ST HOSP IP/OBS HIGH 75: CPT

## 2024-08-04 RX ORDER — CLONIDINE HYDROCHLORIDE 0.1 MG/1
0.1 TABLET ORAL 3 TIMES DAILY
Status: DISCONTINUED | OUTPATIENT
Start: 2024-08-04 | End: 2024-08-07 | Stop reason: HOSPADM

## 2024-08-04 RX ORDER — SODIUM CHLORIDE 0.9 % (FLUSH) 0.9 %
5-40 SYRINGE (ML) INJECTION EVERY 12 HOURS SCHEDULED
Status: DISCONTINUED | OUTPATIENT
Start: 2024-08-04 | End: 2024-08-07 | Stop reason: HOSPADM

## 2024-08-04 RX ORDER — ONDANSETRON 2 MG/ML
4 INJECTION INTRAMUSCULAR; INTRAVENOUS EVERY 6 HOURS PRN
Status: DISCONTINUED | OUTPATIENT
Start: 2024-08-04 | End: 2024-08-07 | Stop reason: HOSPADM

## 2024-08-04 RX ORDER — CINACALCET 30 MG/1
30 TABLET, FILM COATED ORAL 2 TIMES DAILY WITH MEALS
COMMUNITY

## 2024-08-04 RX ORDER — POLYETHYLENE GLYCOL 3350 17 G/17G
17 POWDER, FOR SOLUTION ORAL DAILY PRN
Status: DISCONTINUED | OUTPATIENT
Start: 2024-08-04 | End: 2024-08-07 | Stop reason: HOSPADM

## 2024-08-04 RX ORDER — LANTHANUM CARBONATE 500 MG/1
1000 TABLET, CHEWABLE ORAL 3 TIMES DAILY
Status: DISCONTINUED | OUTPATIENT
Start: 2024-08-04 | End: 2024-08-07 | Stop reason: HOSPADM

## 2024-08-04 RX ORDER — ASPIRIN 81 MG/1
81 TABLET, CHEWABLE ORAL DAILY
Status: DISCONTINUED | OUTPATIENT
Start: 2024-08-05 | End: 2024-08-07 | Stop reason: HOSPADM

## 2024-08-04 RX ORDER — SODIUM CHLORIDE 9 MG/ML
INJECTION, SOLUTION INTRAVENOUS PRN
Status: DISCONTINUED | OUTPATIENT
Start: 2024-08-04 | End: 2024-08-07 | Stop reason: HOSPADM

## 2024-08-04 RX ORDER — MINOXIDIL 2.5 MG/1
2.5 TABLET ORAL 2 TIMES DAILY
Status: DISCONTINUED | OUTPATIENT
Start: 2024-08-04 | End: 2024-08-07 | Stop reason: HOSPADM

## 2024-08-04 RX ORDER — TEMAZEPAM 15 MG/1
30 CAPSULE ORAL NIGHTLY PRN
Status: DISCONTINUED | OUTPATIENT
Start: 2024-08-04 | End: 2024-08-07 | Stop reason: HOSPADM

## 2024-08-04 RX ORDER — DIAZEPAM 2 MG/1
5 TABLET ORAL EVERY 6 HOURS PRN
Status: DISCONTINUED | OUTPATIENT
Start: 2024-08-04 | End: 2024-08-07 | Stop reason: HOSPADM

## 2024-08-04 RX ORDER — LEVOTHYROXINE SODIUM 88 UG/1
88 TABLET ORAL DAILY
Status: DISCONTINUED | OUTPATIENT
Start: 2024-08-05 | End: 2024-08-07 | Stop reason: HOSPADM

## 2024-08-04 RX ORDER — NIFEDIPINE 60 MG/1
60 TABLET, EXTENDED RELEASE ORAL DAILY
Status: DISCONTINUED | OUTPATIENT
Start: 2024-08-05 | End: 2024-08-07 | Stop reason: HOSPADM

## 2024-08-04 RX ORDER — ACETAMINOPHEN 325 MG/1
650 TABLET ORAL EVERY 6 HOURS PRN
Status: DISCONTINUED | OUTPATIENT
Start: 2024-08-04 | End: 2024-08-07 | Stop reason: HOSPADM

## 2024-08-04 RX ORDER — ONDANSETRON 4 MG/1
4 TABLET, ORALLY DISINTEGRATING ORAL EVERY 8 HOURS PRN
Status: DISCONTINUED | OUTPATIENT
Start: 2024-08-04 | End: 2024-08-07 | Stop reason: HOSPADM

## 2024-08-04 RX ORDER — HEPARIN SODIUM 5000 [USP'U]/ML
5000 INJECTION, SOLUTION INTRAVENOUS; SUBCUTANEOUS EVERY 8 HOURS SCHEDULED
Status: DISCONTINUED | OUTPATIENT
Start: 2024-08-04 | End: 2024-08-07 | Stop reason: HOSPADM

## 2024-08-04 RX ORDER — ACETAMINOPHEN 650 MG/1
650 SUPPOSITORY RECTAL EVERY 6 HOURS PRN
Status: DISCONTINUED | OUTPATIENT
Start: 2024-08-04 | End: 2024-08-07 | Stop reason: HOSPADM

## 2024-08-04 RX ORDER — SODIUM CHLORIDE 0.9 % (FLUSH) 0.9 %
5-40 SYRINGE (ML) INJECTION PRN
Status: DISCONTINUED | OUTPATIENT
Start: 2024-08-04 | End: 2024-08-07 | Stop reason: HOSPADM

## 2024-08-04 RX ORDER — CINACALCET 30 MG/1
30 TABLET, FILM COATED ORAL 2 TIMES DAILY WITH MEALS
Status: DISCONTINUED | OUTPATIENT
Start: 2024-08-05 | End: 2024-08-07 | Stop reason: HOSPADM

## 2024-08-04 ASSESSMENT — LIFESTYLE VARIABLES
HOW MANY STANDARD DRINKS CONTAINING ALCOHOL DO YOU HAVE ON A TYPICAL DAY: PATIENT DOES NOT DRINK
HOW OFTEN DO YOU HAVE A DRINK CONTAINING ALCOHOL: NEVER

## 2024-08-05 ENCOUNTER — TELEPHONE (OUTPATIENT)
Dept: CARDIOLOGY CLINIC | Age: 69
End: 2024-08-05

## 2024-08-05 PROBLEM — I44.2 AV BLOCK, 3RD DEGREE (HCC): Status: ACTIVE | Noted: 2024-08-05

## 2024-08-05 LAB
ALBUMIN SERPL BCG-MCNC: 3.8 G/DL (ref 3.5–5.1)
ALP SERPL-CCNC: 121 U/L (ref 38–126)
ALT SERPL W/O P-5'-P-CCNC: < 5 U/L (ref 11–66)
ANION GAP SERPL CALC-SCNC: 13 MEQ/L (ref 8–16)
ANION GAP SERPL CALC-SCNC: 14 MEQ/L (ref 8–16)
AST SERPL-CCNC: 11 U/L (ref 5–40)
BASOPHILS ABSOLUTE: 0 THOU/MM3 (ref 0–0.1)
BASOPHILS ABSOLUTE: 0 THOU/MM3 (ref 0–0.1)
BASOPHILS NFR BLD AUTO: 0.5 %
BASOPHILS NFR BLD AUTO: 0.6 %
BILIRUB SERPL-MCNC: 0.4 MG/DL (ref 0.3–1.2)
BUN SERPL-MCNC: 19 MG/DL (ref 7–22)
BUN SERPL-MCNC: 44 MG/DL (ref 7–22)
CA-I BLD ISE-SCNC: 0.96 MMOL/L (ref 1.12–1.32)
CALCIUM SERPL-MCNC: 8.8 MG/DL (ref 8.5–10.5)
CALCIUM SERPL-MCNC: 9.1 MG/DL (ref 8.5–10.5)
CHLORIDE SERPL-SCNC: 94 MEQ/L (ref 98–111)
CHLORIDE SERPL-SCNC: 97 MEQ/L (ref 98–111)
CO2 SERPL-SCNC: 24 MEQ/L (ref 23–33)
CO2 SERPL-SCNC: 28 MEQ/L (ref 23–33)
CREAT SERPL-MCNC: 3.8 MG/DL (ref 0.4–1.2)
CREAT SERPL-MCNC: 6.3 MG/DL (ref 0.4–1.2)
DEPRECATED RDW RBC AUTO: 48.1 FL (ref 35–45)
DEPRECATED RDW RBC AUTO: 50 FL (ref 35–45)
EOSINOPHIL NFR BLD AUTO: 1.8 %
EOSINOPHIL NFR BLD AUTO: 2.3 %
EOSINOPHILS ABSOLUTE: 0.1 THOU/MM3 (ref 0–0.4)
EOSINOPHILS ABSOLUTE: 0.2 THOU/MM3 (ref 0–0.4)
ERYTHROCYTE [DISTWIDTH] IN BLOOD BY AUTOMATED COUNT: 14.6 % (ref 11.5–14.5)
ERYTHROCYTE [DISTWIDTH] IN BLOOD BY AUTOMATED COUNT: 14.8 % (ref 11.5–14.5)
GFR SERPL CREATININE-BSD FRML MDRD: 12 ML/MIN/1.73M2
GFR SERPL CREATININE-BSD FRML MDRD: 7 ML/MIN/1.73M2
GLUCOSE BLD STRIP.AUTO-MCNC: 90 MG/DL (ref 70–108)
GLUCOSE SERPL-MCNC: 91 MG/DL (ref 70–108)
GLUCOSE SERPL-MCNC: 92 MG/DL (ref 70–108)
HCT VFR BLD AUTO: 31.4 % (ref 37–47)
HCT VFR BLD AUTO: 31.5 % (ref 37–47)
HGB BLD-MCNC: 10 GM/DL (ref 12–16)
HGB BLD-MCNC: 9.8 GM/DL (ref 12–16)
IMM GRANULOCYTES # BLD AUTO: 0.1 THOU/MM3 (ref 0–0.07)
IMM GRANULOCYTES # BLD AUTO: 0.11 THOU/MM3 (ref 0–0.07)
IMM GRANULOCYTES NFR BLD AUTO: 1.4 %
IMM GRANULOCYTES NFR BLD AUTO: 1.7 %
LYMPHOCYTES ABSOLUTE: 1 THOU/MM3 (ref 1–4.8)
LYMPHOCYTES ABSOLUTE: 1.5 THOU/MM3 (ref 1–4.8)
LYMPHOCYTES NFR BLD AUTO: 17.4 %
LYMPHOCYTES NFR BLD AUTO: 19.4 %
MAGNESIUM SERPL-MCNC: 2 MG/DL (ref 1.6–2.4)
MCH RBC QN AUTO: 28.7 PG (ref 26–33)
MCH RBC QN AUTO: 29 PG (ref 26–33)
MCHC RBC AUTO-ENTMCNC: 31.2 GM/DL (ref 32.2–35.5)
MCHC RBC AUTO-ENTMCNC: 31.7 GM/DL (ref 32.2–35.5)
MCV RBC AUTO: 91.3 FL (ref 81–99)
MCV RBC AUTO: 92.1 FL (ref 81–99)
MONOCYTES ABSOLUTE: 1 THOU/MM3 (ref 0.4–1.3)
MONOCYTES ABSOLUTE: 1.1 THOU/MM3 (ref 0.4–1.3)
MONOCYTES NFR BLD AUTO: 14.7 %
MONOCYTES NFR BLD AUTO: 16.5 %
NEUTROPHILS ABSOLUTE: 3.7 THOU/MM3 (ref 1.8–7.7)
NEUTROPHILS ABSOLUTE: 4.8 THOU/MM3 (ref 1.8–7.7)
NEUTROPHILS NFR BLD AUTO: 61.6 %
NEUTROPHILS NFR BLD AUTO: 62.1 %
NRBC BLD AUTO-RTO: 0 /100 WBC
NRBC BLD AUTO-RTO: 0 /100 WBC
PLATELET # BLD AUTO: 215 THOU/MM3 (ref 130–400)
PLATELET # BLD AUTO: 248 THOU/MM3 (ref 130–400)
PMV BLD AUTO: 10.6 FL (ref 9.4–12.4)
PMV BLD AUTO: 10.7 FL (ref 9.4–12.4)
POTASSIUM SERPL-SCNC: 4.3 MEQ/L (ref 3.5–5.2)
POTASSIUM SERPL-SCNC: 5.1 MEQ/L (ref 3.5–5.2)
PROT SERPL-MCNC: 6.1 G/DL (ref 6.1–8)
RBC # BLD AUTO: 3.41 MILL/MM3 (ref 4.2–5.4)
RBC # BLD AUTO: 3.45 MILL/MM3 (ref 4.2–5.4)
SODIUM SERPL-SCNC: 135 MEQ/L (ref 135–145)
SODIUM SERPL-SCNC: 135 MEQ/L (ref 135–145)
WBC # BLD AUTO: 6 THOU/MM3 (ref 4.8–10.8)
WBC # BLD AUTO: 7.8 THOU/MM3 (ref 4.8–10.8)

## 2024-08-05 PROCEDURE — 93005 ELECTROCARDIOGRAM TRACING: CPT | Performed by: INTERNAL MEDICINE

## 2024-08-05 PROCEDURE — 90935 HEMODIALYSIS ONE EVALUATION: CPT

## 2024-08-05 PROCEDURE — 2580000003 HC RX 258

## 2024-08-05 PROCEDURE — 99233 SBSQ HOSP IP/OBS HIGH 50: CPT | Performed by: INTERNAL MEDICINE

## 2024-08-05 PROCEDURE — 83735 ASSAY OF MAGNESIUM: CPT

## 2024-08-05 PROCEDURE — 6360000002 HC RX W HCPCS

## 2024-08-05 PROCEDURE — 6370000000 HC RX 637 (ALT 250 FOR IP)

## 2024-08-05 PROCEDURE — 36415 COLL VENOUS BLD VENIPUNCTURE: CPT

## 2024-08-05 PROCEDURE — 80048 BASIC METABOLIC PNL TOTAL CA: CPT

## 2024-08-05 PROCEDURE — 85025 COMPLETE CBC W/AUTO DIFF WBC: CPT

## 2024-08-05 PROCEDURE — 82948 REAGENT STRIP/BLOOD GLUCOSE: CPT

## 2024-08-05 PROCEDURE — 5A1D70Z PERFORMANCE OF URINARY FILTRATION, INTERMITTENT, LESS THAN 6 HOURS PER DAY: ICD-10-PCS | Performed by: INTERNAL MEDICINE

## 2024-08-05 PROCEDURE — 2100000000 HC CCU R&B

## 2024-08-05 PROCEDURE — 80053 COMPREHEN METABOLIC PANEL: CPT

## 2024-08-05 PROCEDURE — 99223 1ST HOSP IP/OBS HIGH 75: CPT | Performed by: INTERNAL MEDICINE

## 2024-08-05 PROCEDURE — 82330 ASSAY OF CALCIUM: CPT

## 2024-08-05 RX ORDER — ATROPINE SULFATE 0.1 MG/ML
0.5 INJECTION INTRAVENOUS ONCE
Status: COMPLETED | OUTPATIENT
Start: 2024-08-05 | End: 2024-08-05

## 2024-08-05 RX ORDER — ATROPINE SULFATE 1 MG/ML
1 INJECTION, SOLUTION INTRAVENOUS ONCE
Status: DISCONTINUED | OUTPATIENT
Start: 2024-08-05 | End: 2024-08-07 | Stop reason: HOSPADM

## 2024-08-05 RX ORDER — ATROPINE SULFATE 0.1 MG/ML
INJECTION INTRAVENOUS
Status: COMPLETED
Start: 2024-08-05 | End: 2024-08-05

## 2024-08-05 RX ORDER — HYDRALAZINE HYDROCHLORIDE 20 MG/ML
5 INJECTION INTRAMUSCULAR; INTRAVENOUS ONCE
Status: COMPLETED | OUTPATIENT
Start: 2024-08-05 | End: 2024-08-05

## 2024-08-05 RX ADMIN — LEVOTHYROXINE SODIUM 88 MCG: 0.09 TABLET ORAL at 06:20

## 2024-08-05 RX ADMIN — ONDANSETRON 4 MG: 4 TABLET, ORALLY DISINTEGRATING ORAL at 22:58

## 2024-08-05 RX ADMIN — ASPIRIN 81 MG 81 MG: 81 TABLET ORAL at 12:39

## 2024-08-05 RX ADMIN — ATROPINE SULFATE 0.5 MG: 0.1 INJECTION INTRAVENOUS at 16:45

## 2024-08-05 RX ADMIN — LANTHANUM CARBONATE 1000 MG: 500 TABLET, CHEWABLE ORAL at 18:25

## 2024-08-05 RX ADMIN — NIFEDIPINE 60 MG: 60 TABLET, EXTENDED RELEASE ORAL at 12:16

## 2024-08-05 RX ADMIN — SODIUM CHLORIDE, PRESERVATIVE FREE 10 ML: 5 INJECTION INTRAVENOUS at 23:13

## 2024-08-05 RX ADMIN — HEPARIN SODIUM 5000 UNITS: 5000 INJECTION INTRAVENOUS; SUBCUTANEOUS at 22:26

## 2024-08-05 RX ADMIN — ONDANSETRON 4 MG: 4 TABLET, ORALLY DISINTEGRATING ORAL at 07:31

## 2024-08-05 RX ADMIN — TEMAZEPAM 30 MG: 15 CAPSULE ORAL at 23:16

## 2024-08-05 RX ADMIN — MINOXIDIL 2.5 MG: 2.5 TABLET ORAL at 00:02

## 2024-08-05 RX ADMIN — ONDANSETRON 4 MG: 4 TABLET, ORALLY DISINTEGRATING ORAL at 15:44

## 2024-08-05 RX ADMIN — CINACALCET HYDROCHLORIDE 30 MG: 30 TABLET, FILM COATED ORAL at 18:23

## 2024-08-05 RX ADMIN — HYDRALAZINE HYDROCHLORIDE 5 MG: 20 INJECTION INTRAMUSCULAR; INTRAVENOUS at 22:33

## 2024-08-05 RX ADMIN — SODIUM CHLORIDE, PRESERVATIVE FREE 10 ML: 5 INJECTION INTRAVENOUS at 12:18

## 2024-08-05 RX ADMIN — CLONIDINE HYDROCHLORIDE 0.1 MG: 0.1 TABLET ORAL at 12:16

## 2024-08-05 RX ADMIN — HEPARIN SODIUM 5000 UNITS: 5000 INJECTION INTRAVENOUS; SUBCUTANEOUS at 06:20

## 2024-08-05 RX ADMIN — SODIUM CHLORIDE, PRESERVATIVE FREE 10 ML: 5 INJECTION INTRAVENOUS at 00:02

## 2024-08-05 RX ADMIN — ACETAMINOPHEN 650 MG: 325 TABLET ORAL at 18:54

## 2024-08-05 RX ADMIN — MINOXIDIL 2.5 MG: 2.5 TABLET ORAL at 12:16

## 2024-08-05 RX ADMIN — CINACALCET HYDROCHLORIDE 30 MG: 30 TABLET, FILM COATED ORAL at 12:39

## 2024-08-05 RX ADMIN — MINOXIDIL 2.5 MG: 2.5 TABLET ORAL at 21:54

## 2024-08-05 RX ADMIN — HEPARIN SODIUM 5000 UNITS: 5000 INJECTION INTRAVENOUS; SUBCUTANEOUS at 15:15

## 2024-08-05 RX ADMIN — HEPARIN SODIUM 5000 UNITS: 5000 INJECTION INTRAVENOUS; SUBCUTANEOUS at 00:01

## 2024-08-05 RX ADMIN — CLONIDINE HYDROCHLORIDE 0.1 MG: 0.1 TABLET ORAL at 00:02

## 2024-08-05 ASSESSMENT — ENCOUNTER SYMPTOMS
ABDOMINAL PAIN: 0
DIARRHEA: 0
NAUSEA: 0
SHORTNESS OF BREATH: 1
VOMITING: 0
PHOTOPHOBIA: 0
COUGH: 0

## 2024-08-05 ASSESSMENT — PAIN SCALES - GENERAL
PAINLEVEL_OUTOF10: 5
PAINLEVEL_OUTOF10: 8
PAINLEVEL_OUTOF10: 5
PAINLEVEL_OUTOF10: 4

## 2024-08-05 ASSESSMENT — PAIN DESCRIPTION - LOCATION: LOCATION: BACK

## 2024-08-05 ASSESSMENT — PAIN - FUNCTIONAL ASSESSMENT: PAIN_FUNCTIONAL_ASSESSMENT: PREVENTS OR INTERFERES SOME ACTIVE ACTIVITIES AND ADLS

## 2024-08-05 ASSESSMENT — PAIN DESCRIPTION - ONSET: ONSET: ON-GOING

## 2024-08-05 ASSESSMENT — PAIN DESCRIPTION - PAIN TYPE: TYPE: CHRONIC PAIN

## 2024-08-05 ASSESSMENT — PAIN DESCRIPTION - ORIENTATION: ORIENTATION: LEFT;MID

## 2024-08-05 ASSESSMENT — PAIN DESCRIPTION - FREQUENCY: FREQUENCY: INTERMITTENT

## 2024-08-05 ASSESSMENT — PAIN DESCRIPTION - DESCRIPTORS: DESCRIPTORS: SHARP;STABBING

## 2024-08-05 NOTE — CARE COORDINATION
Case Management Assessment Initial Evaluation    Date/Time of Evaluation: 2024 7:22 AM  Assessment Completed by: Parisa Garcia, RN    If patient is discharged prior to next notation, then this note serves as note for discharge by case management.    Patient Name: Tenisha Solis                   YOB: 1955  Diagnosis: Syncope, near [R55]                   Date / Time: 2024  7:41 PM  Location: [address] [place] [address]     Patient Admission Status: Inpatient   Readmission Risk Low 0-14, Mod 15-19), High > 20: Readmission Risk Score: 11.7    Current PCP: Axel Evans APRN  Health Care Decision Makers:   Primary Decision Maker: Rafy Solis - Spouse - [phone]    Additional Case Management Notes: Transferred from [facility] with SOB and syncopal episode. Was wearing Holter monitor that read 13 second pause. Consulting Cardiology and Nephrology.     Procedures: None    Imagin/4 PCXR (at [facility]): Findings suggesting acute congestive heart failure.  No significant pleural effusion.     Patient Goals/Plan/Treatment Preferences: Spoke with pt and  upon pt's arrival back from dialysis. They live at home with an adult granddaughter. Pt is current with [facility] for OP HD, MWF at 1430. Holter monitor. Denies need for HH at this time.        24 1149   Service Assessment   Patient Orientation Alert and Oriented   Cognition Alert   History Provided By Patient   Primary Caregiver Self   Accompanied By/Relationship    Support Systems Spouse/Significant Other;Children;Family Members   Patient's Healthcare Decision Maker is: Named in Scanned ACP Document   PCP Verified by CM Yes   Last Visit to PCP Within last 3 months   Prior Functional Level Assistance with the following:;Cooking;Housework;Shopping   Current Functional Level Assistance with the following:;Cooking;Housework;Shopping   Can patient return to prior living arrangement Yes   Ability to make needs known: Good   Family able to

## 2024-08-05 NOTE — TELEPHONE ENCOUNTER
Received emergent monitor strips that show intermittent compete heart block with multiple pauses noted, longest pause is 13.1 seconds.     Patient went to the ED and is currently admitted.     Strips scanned and attached to telephone encounter.

## 2024-08-05 NOTE — FLOWSHEET NOTE
08/05/24 0800 08/05/24 1124   Vital Signs   BP (!) 177/77 (!) 177/78   Temp 99 °F (37.2 °C) 98.7 °F (37.1 °C)   Pulse 52 53   Respirations 19 18   SpO2 100 % 100 %   Weight - Scale 68 kg (149 lb 14.6 oz)  --    Weight Method Bed scale Bed scale   Percent Weight Change 1.34 -2.94   Post-Hemodialysis Assessment   Post-Treatment Procedures  --  Blood returned;Access bleeding time < 10 minutes   Machine Disinfection Process  --  Acid/Vinegar Clean;Heat Disinfect;Exterior Machine Disinfection   Blood Volume Processed (Liters)  --  69.6 L   Dialyzer Clearance  --  Lightly streaked   Duration of Treatment (minutes)  --  210 minutes   Heparin Amount Administered During Treatment (mL)  --  0 mL   Hemodialysis Intake (ml)  --  400 ml   Hemodialysis Output (ml)  --  2900 ml   NET Removed (ml)  --  2500   Tolerated Treatment  --  Good     Stable 3 hour TX completed. Removed 2.5 L of fluid. pt tolerated fluid removal well. Pressure held to each needle site x 10 min. Drsg clean, dry, and intact upon leaving unit. Report called to primary RN. TX record printed for scanning into EMR.

## 2024-08-05 NOTE — CONSULTS
CRITICAL CARE- Consult History & Physical      Patient: Tenisha Solis    Unit/Bed:3B-24/024-A  YOB: 1955  MRN: [id number]   Acct: [id number]   PCP: Axel Evans APRN    Date of Service: Pt seen/examined on 08/05/24    Chief Complaint:  Symptomatic bradycardia, initially presented to [facility] on 8/4/2020 for with syncope.    Assessment and Plan:-  Symptomatic bradycardia.  Experienced 13-second asystolic pause with syncopal episode at home.  Symptomatic bradycardia and sinus pauses noted during this hospitalization associated with diaphoresis, lightheadedness, nausea.  Patient transferred to ICU for further surveillance while awaiting possible pacemaker placement by Dr. eRcinos 8/6/2024.   Continue telemetry  Pacer pads in place  Atropine at bedside  N.p.o. after midnight  Continue to monitor for S/S pauses and ACS  Hypertension.  Pressures to 203/73 on 8/5/2024.  Holding clonidine in setting of symptomatic bradycardia.  Continue hydralazine 10 mg IV every 4 hours as needed for SBP > 170.  AS, s/p TAVR 7/29/2024.  Continue aspirin.  CAD, nonobstructive.   Continue ASA.   Continue telemetry, monitor for signs/symptoms of ACS.  Chronic HFpEF. Echo 7/30/2024 shows LVEF 60-65%. BNP significantly elevated on presentation to OSH.    Daily weights, strict I/Os  GDMT as tolerated  Sodium restricted, fluid restricted diet  ESRD, on HD, MWF.  Access through left AV fistula.  Nephrology managing.  Follow with daily BMP, electrolytes on HD days.  Hypothyroidism  Continue home dose levothyroxine.     History Of Present Illness:    Per original HPI: \"Tenisha Solis is a 68 y.o. female with PMHx of AS s/p TAVR, ESRD on HD, HTN, hypothyroid, anxiety, Gout, who presented to [facility] with chief complaint of syncopal episode. The patient had just undergone a TAVR on 7/29 with Dr. Kang.  The procedure was uncomplicated.  She had new left bundle after the procedure but no high grade AVB.  The temporary pacer was 
[facility]    [address] [id number]  [phone]     Hospital Consult  8/5/2024 11:49 AM    Pt Name:    Tenisha Solis  MRN:     [id number]   [id number]  YOB: 1955  Admit Date:    8/4/2024  7:41 PM  Primary Care Physician:  Axel Evans APRN        Reason for Consult:  ESRD/dialysis management    History:   The patient is a 68 y.o. female with past medical history of AS s/p TAVR, ESRD on HD MWF, HTN, hypothyroidism, gout and anxiety who presented to [facility] with complaints of a syncopal episode. Patient underwent an uncomplicated TAVR on 7/29 with new LBBB after procedure w/o high grade AVB. Patient had temporary pacemaker removed upon discharge and sent home with 30 day event monitor. Monitor tracing revealed 13 seconds of asystole with spontaneous return to NSR.  Patient states she was sitting in her chair watching a movie when she started feeling dizzy and became short of breath. During this time, she states she became diaphoretic and \"felt off\".  Her  reported full body twitching during the episode. She currently feels weak and tired and has felt this way since TAVR procedure. Patient produces urine. Patient denies any chest pain, shortness of breath or lightheadedness.     Past Medical History:  Past Medical History:   Diagnosis Date    Anxiety     Diarrhea     Dryness of periwound skin     Gout, joint     Hemodialysis patient (HCC)     Hernia of abdominal cavity     Hypertension     Kidney disease     Mouth dryness        Past Surgical History:  Past Surgical History:   Procedure Laterality Date    AV FISTULA CREATION Left     CARDIAC PROCEDURE Bilateral 06/06/2024    Left and right heart cath / coronary angiography performed by Cristian Gomez MD at [facility] CARDIAC CATH LAB    CARDIAC PROCEDURE N/A 07/29/2024    Transcatheter aortic valve replacement performed by Ha Kang MD at [facility] CARDIAC CATH LAB    TOTAL HIP ARTHROPLASTY Right 
Patient seen and examined by me during hemodialysis  Feels weak and tired  Blood pressure 179/85 UF 2900 mm blood flow rate of 400 venous pressure of 190  We will continue Monday Wednesday Friday dialysis  Potassium 5.1 HD on a 2K bath  See full consult for additional details    Gordon Shah MD    
[facility]   [facility] (EP)  [address] [address]  Dept: [phone]  Cardiac Electrophysiology: Consultation Note  Patient's demographics:  Date:   8/5/2024  Patient name:              Tenisha Solis  YOB: 1955  Sex:    female   MRN:   [id number]    Primary Care Physician:  Axel Evans APRN    Cardiologist:  Troy Mora MD    Reason for Consultation:  Syncope annd sinus arrest with asystole.     Clinical Summary:  68/F underwent transcutaneous aortic valve implantation on 7/29/2024 for symptomatic severe aortic stenosis.  Postoperatively she developed bifascicular block followed by left bundle branch block and prolonged NE interval.  She was discharged home with a home monitor.  She was resting in the recliner yesterday afternoon when she became dizzy and suddenly collapsed losing her consciousness for several seconds.  She woke up spontaneously and was brought to the emergency room.  She received a call from the telemonitoring at about a 13-second pause corresponding to the event.  Upon arrival to the emergency room she was awake and hemodynamically stable.  No incisional disease on maintenance hemodialysis.   Laboratory data significant for creatinine of 6.3, potassium 5.1, and hemoglobin 9.8 (previous 11). Preserved LVEF.  Normally functioning bioprosthetic aortic valve. Medcial Hx: AS/ANTHONY (7/29/24), post TAVR alternating bundle branch block, nonobstructive CAD, hypertension, hypothyroidism, ESRD/maintenance hemodialysis, left upper extremity AV fistula and gout in remission.    Review of systems:  Constitutional: Negative for chills and fever  HENT: Negative for congestion, sinus pressure, sneezing and sore throat.    Eyes: Negative for pain, discharge, redness and itching.   Respiratory: Negative for apnea, cough  Gastrointestinal: Negative for blood in stool, constipation, diarrhea   Endocrine: Negative for cold intolerance, heat 
TRIG 159 06/06/2024 01:09 PM     TSH:   Lab Results   Component Value Date/Time    TSH 1.210 01/31/2024 06:09 AM     UA: No results found for: \"NITRITE\", \"COLORU\", \"PHUR\", \"LABCAST\", \"WBCUA\", \"RBCUA\", \"MUCUS\", \"TRICHOMONAS\", \"YEAST\", \"BACTERIA\", \"CLARITYU\", \"SPECGRAV\", \"LEUKOCYTESUR\", \"UROBILINOGEN\", \"BILIRUBINUR\", \"BLOODU\", \"GLUCOSEU\", \"AMORPHOUS\"      Physical Exam:  Vitals:    08/05/24 0800   BP: (!) 177/77   Pulse: 52   Resp: 19   Temp: 99 °F (37.2 °C)   SpO2: 100%    No intake or output data in the 24 hours ending 08/05/24 1200   General:  No acute distress  Neck: Supple, no JVD, no carotid bruits  Heart: Borderline bradycardic, Regular rhythm normal S1 and S2, no rubs, systolic murmur  Lungs: clear to ascultation no rales, wheezes, or rhonchi  Abdomen: positive bowel sounds, soft, non-tender, non-distended, no bruits, no masses  Extremities:no clubbing, cyanosis or edema, left AV fistula  Neurologic: alert and oriented x 3, cranial nerves 2-12 grossly intact, motor and sensory intact, moving all extremities  Skin: No rashes  Psych: AO x 3, no depression/magali, no pressured speech, normal affect  Lymph: No obvious LAD      Assessment:  Sinus arrest with asystole  Worsening conduction post TAVR  Aortic stenosis s/p TAVR  HFpEF        Plan:  Sinus arrest with asystole - s/p TAVR with new LBBB on ECG. Cardiac event monitor tracing with 13-second asystolic pause. ECG on 08/04/24 with sinus bradycardia, prolonged NE to 302 ms, and LBBB.   Continue precautions  Telemetry  Avoid AV mariel blocking agents  EP consulted, patient requires PPM  Aortic stenosis s/p TAVR - Followed by Dr. Kang. Severe AS s/p TAVR (07/29/24). ECG post TAVR w/ bifascicular block followed by LBBB. Patient discharged w/ event monitor. ECG on 08/04/24 shows sinus rhythm, prolonged NE interval, and LBBB.   Continue ASA  HFpEF - LVEF 60-65% on 07/30/24. Patient reports becoming increasingly short of breath and dyspneic on the day of her cardiac

## 2024-08-05 NOTE — H&P
Hospitalist History & Physical    Patient:  Tenisha Solis    Unit/Bed:3B-24/024-A  YOB: 1955  MRN: [id number]   Acct: [id number]   PCP: Axel Evans APRN  Code Status: Prior    Date of Service: Pt seen/examined on 08/04/24 and admitted to Inpatient with expected LOS greater than two midnights due to medical therapy.     Chief Complaint: Syncope    Assessment/Plan:    Syncope : Secondary to 13 second of asystole captured on event monitor. Spontaneously returned to NSR.  S/p TAVR on 7/29. EKG on arrival to facility shows sinus bradycardia with 1st degree AVB (302 ms) and LBBB  Consult cardiology.  Pacer pads at bedside.  Fall precautions.   Telemetry.   Avoid AV mariel blocking agents.     AS s/p TAVR: 7/29. She did have new LBBB post procedure but no high grade AV blocks during her admission.  She was discharged home on 7/30 with a 30 day event monitor.   Continue ASA.  Consult cardiology/EP-will likely need PPM.     ESRD on HD MWF: Stable.   Consult nephrology in AM for management of ESRD/dialysis.   Renally dose medications.   Daily BMP.     HTN: Elevated.  Did require Nitro infusion during last admission.  Continue home Clonidine, Minoxidil    Hypothyroidism:  Continue home Synthroid.     Anxiety:  Continue PRN Valium    Gout:   Continue home Allopurinol.       LDA: []CVC / []PICC / []Midline / []Valdez / []Drains / []Mediport / [x]None  Antibiotics: No  Steroids: No  Labs: [x]Yes / []No  IVF: []Yes / [x]No    Level of care: [x]Step Down / []Med-Surg  Bed Status: [x]Inpatient / []Observation  Telemetry: [x]Yes / []No  PT/OT: []Yes / [x]No    DVT Prophylaxis: [] Lovenox / [] Heparin / [] SCDs / [] Already on Systemic Anticoagulation / [] None       History of Present Illness:  Tenisha Solis is a 68 y.o. female with PMHx of AS s/p TAVR, ESRD on HD, HTN, hypothyroid, anxiety, Gout, who presented to [facility] with chief complaint of syncopal episode. The patient had just undergone a TAVR

## 2024-08-05 NOTE — CARE COORDINATION
08/05/24 1154   Readmission Assessment   Number of Days since last admission? 1-7 days   Previous Disposition Home with Family   Who is being Interviewed Patient;Caregiver   What was the patient's/caregiver's perception as to why they think they needed to return back to the hospital? Other (Comment)  (passed out at home)   Did you visit your Primary Care Physician after you left the hospital, before you returned this time? No   Why weren't you able to visit your PCP? Other (Comment)  (readmitted prior to appt)   Did you see a specialist, such as Cardiac, Pulmonary, Orthopedic Physician, etc. after you left the hospital? Yes   Who advised the patient to return to the hospital? Caregiver   Does the patient report anything that got in the way of taking their medications? No   In our efforts to provide the best possible care to you and others like you, can you think of anything that we could have done to help you after you left the hospital the first time, so that you might not have needed to return so soon? Other (Comment)  (\"I don't think so\")

## 2024-08-05 NOTE — PLAN OF CARE
Problem: Discharge Planning  Goal: Discharge to home or other facility with appropriate resources  Outcome: Progressing  Flowsheets (Taken 8/4/2024 1959 by Arlene Girard, RN)  Discharge to home or other facility with appropriate resources: Identify barriers to discharge with patient and caregiver     Problem: Safety - Adult  Goal: Free from fall injury  Outcome: Progressing  Flowsheets (Taken 8/5/2024 1717)  Free From Fall Injury: Instruct family/caregiver on patient safety  Note: Bed locked & in low position, call light in reach, side-rails up x2, bed/chair alarm utilized, non-slip socks on when ambulating, reminded patient to use call light to call for assistance.    Care plan reviewed with patient.  Patient verbalizes understanding of the care plan and contributed to goal setting.

## 2024-08-06 ENCOUNTER — ANESTHESIA (OUTPATIENT)
Age: 69
DRG: 228 | End: 2024-08-06
Payer: MEDICARE

## 2024-08-06 ENCOUNTER — ANESTHESIA EVENT (OUTPATIENT)
Age: 69
DRG: 228 | End: 2024-08-06
Payer: MEDICARE

## 2024-08-06 ENCOUNTER — TELEPHONE (OUTPATIENT)
Dept: CARDIOLOGY CLINIC | Age: 69
End: 2024-08-06

## 2024-08-06 PROBLEM — R00.1 BRADYCARDIA: Status: ACTIVE | Noted: 2024-08-06

## 2024-08-06 LAB
ANION GAP SERPL CALC-SCNC: 18 MEQ/L (ref 8–16)
BUN SERPL-MCNC: 24 MG/DL (ref 7–22)
CALCIUM SERPL-MCNC: 9.5 MG/DL (ref 8.5–10.5)
CHLORIDE SERPL-SCNC: 95 MEQ/L (ref 98–111)
CO2 SERPL-SCNC: 23 MEQ/L (ref 23–33)
CREAT SERPL-MCNC: 4.6 MG/DL (ref 0.4–1.2)
DEPRECATED RDW RBC AUTO: 48.4 FL (ref 35–45)
ECHO BSA: 1.75 M2
EKG ATRIAL RATE: 58 BPM
EKG ATRIAL RATE: 66 BPM
EKG P AXIS: 12 DEGREES
EKG P AXIS: 43 DEGREES
EKG P-R INTERVAL: 326 MS
EKG P-R INTERVAL: 376 MS
EKG Q-T INTERVAL: 488 MS
EKG Q-T INTERVAL: 502 MS
EKG QRS DURATION: 148 MS
EKG QRS DURATION: 150 MS
EKG QTC CALCULATION (BAZETT): 492 MS
EKG QTC CALCULATION (BAZETT): 511 MS
EKG R AXIS: -41 DEGREES
EKG R AXIS: -42 DEGREES
EKG T AXIS: 101 DEGREES
EKG T AXIS: 101 DEGREES
EKG VENTRICULAR RATE: 58 BPM
EKG VENTRICULAR RATE: 66 BPM
ERYTHROCYTE [DISTWIDTH] IN BLOOD BY AUTOMATED COUNT: 14.6 % (ref 11.5–14.5)
GFR SERPL CREATININE-BSD FRML MDRD: 10 ML/MIN/1.73M2
GLUCOSE SERPL-MCNC: 85 MG/DL (ref 70–108)
HCT VFR BLD AUTO: 33.7 % (ref 37–47)
HGB BLD-MCNC: 10.8 GM/DL (ref 12–16)
MAGNESIUM SERPL-MCNC: 2.2 MG/DL (ref 1.6–2.4)
MCH RBC QN AUTO: 29.3 PG (ref 26–33)
MCHC RBC AUTO-ENTMCNC: 32 GM/DL (ref 32.2–35.5)
MCV RBC AUTO: 91.3 FL (ref 81–99)
PHOSPHATE SERPL-MCNC: 4.1 MG/DL (ref 2.4–4.7)
PLATELET # BLD AUTO: 276 THOU/MM3 (ref 130–400)
PMV BLD AUTO: 10.3 FL (ref 9.4–12.4)
POTASSIUM SERPL-SCNC: 5.2 MEQ/L (ref 3.5–5.2)
RBC # BLD AUTO: 3.69 MILL/MM3 (ref 4.2–5.4)
SODIUM SERPL-SCNC: 136 MEQ/L (ref 135–145)
WBC # BLD AUTO: 7.2 THOU/MM3 (ref 4.8–10.8)

## 2024-08-06 PROCEDURE — 6360000002 HC RX W HCPCS

## 2024-08-06 PROCEDURE — 02HK3NZ INSERTION OF INTRACARDIAC PACEMAKER INTO RIGHT VENTRICLE, PERCUTANEOUS APPROACH: ICD-10-PCS | Performed by: INTERNAL MEDICINE

## 2024-08-06 PROCEDURE — 7100000000 HC PACU RECOVERY - FIRST 15 MIN: Performed by: INTERNAL MEDICINE

## 2024-08-06 PROCEDURE — 93010 ELECTROCARDIOGRAM REPORT: CPT | Performed by: INTERNAL MEDICINE

## 2024-08-06 PROCEDURE — 6360000002 HC RX W HCPCS: Performed by: NURSE ANESTHETIST, CERTIFIED REGISTERED

## 2024-08-06 PROCEDURE — 83735 ASSAY OF MAGNESIUM: CPT

## 2024-08-06 PROCEDURE — 99232 SBSQ HOSP IP/OBS MODERATE 35: CPT | Performed by: INTERNAL MEDICINE

## 2024-08-06 PROCEDURE — 2580000003 HC RX 258: Performed by: INTERNAL MEDICINE

## 2024-08-06 PROCEDURE — 2140000000 HC CCU INTERMEDIATE R&B

## 2024-08-06 PROCEDURE — 2580000003 HC RX 258

## 2024-08-06 PROCEDURE — 93005 ELECTROCARDIOGRAM TRACING: CPT

## 2024-08-06 PROCEDURE — C1769 GUIDE WIRE: HCPCS | Performed by: INTERNAL MEDICINE

## 2024-08-06 PROCEDURE — 2500000003 HC RX 250 WO HCPCS: Performed by: INTERNAL MEDICINE

## 2024-08-06 PROCEDURE — 6370000000 HC RX 637 (ALT 250 FOR IP)

## 2024-08-06 PROCEDURE — 85027 COMPLETE CBC AUTOMATED: CPT

## 2024-08-06 PROCEDURE — C1786 PMKR, SINGLE, RATE-RESP: HCPCS | Performed by: INTERNAL MEDICINE

## 2024-08-06 PROCEDURE — 7100000001 HC PACU RECOVERY - ADDTL 15 MIN: Performed by: INTERNAL MEDICINE

## 2024-08-06 PROCEDURE — 2709999900 HC NON-CHARGEABLE SUPPLY: Performed by: INTERNAL MEDICINE

## 2024-08-06 PROCEDURE — 2500000003 HC RX 250 WO HCPCS: Performed by: NURSE ANESTHETIST, CERTIFIED REGISTERED

## 2024-08-06 PROCEDURE — 84100 ASSAY OF PHOSPHORUS: CPT

## 2024-08-06 PROCEDURE — 33274 TCAT INSJ/RPL PERM LDLS PM: CPT | Performed by: INTERNAL MEDICINE

## 2024-08-06 PROCEDURE — 3700000000 HC ANESTHESIA ATTENDED CARE: Performed by: INTERNAL MEDICINE

## 2024-08-06 PROCEDURE — 99291 CRITICAL CARE FIRST HOUR: CPT | Performed by: INTERNAL MEDICINE

## 2024-08-06 PROCEDURE — C1894 INTRO/SHEATH, NON-LASER: HCPCS | Performed by: INTERNAL MEDICINE

## 2024-08-06 PROCEDURE — 6360000002 HC RX W HCPCS: Performed by: INTERNAL MEDICINE

## 2024-08-06 PROCEDURE — 3700000001 HC ADD 15 MINUTES (ANESTHESIA): Performed by: INTERNAL MEDICINE

## 2024-08-06 PROCEDURE — 80048 BASIC METABOLIC PNL TOTAL CA: CPT

## 2024-08-06 PROCEDURE — 36415 COLL VENOUS BLD VENIPUNCTURE: CPT

## 2024-08-06 PROCEDURE — 6360000004 HC RX CONTRAST MEDICATION: Performed by: INTERNAL MEDICINE

## 2024-08-06 DEVICE — TPS MC1VR01US MICRA US SCALABLE
Type: IMPLANTABLE DEVICE | Status: FUNCTIONAL
Brand: MICRA™

## 2024-08-06 RX ORDER — DIPHENHYDRAMINE HYDROCHLORIDE 50 MG/ML
50 INJECTION INTRAMUSCULAR; INTRAVENOUS ONCE
Status: DISCONTINUED | OUTPATIENT
Start: 2024-08-06 | End: 2024-08-07 | Stop reason: HOSPADM

## 2024-08-06 RX ORDER — PROPOFOL 10 MG/ML
INJECTION, EMULSION INTRAVENOUS PRN
Status: DISCONTINUED | OUTPATIENT
Start: 2024-08-06 | End: 2024-08-06 | Stop reason: SDUPTHER

## 2024-08-06 RX ORDER — DROPERIDOL 2.5 MG/ML
INJECTION, SOLUTION INTRAMUSCULAR; INTRAVENOUS
Status: COMPLETED
Start: 2024-08-06 | End: 2024-08-06

## 2024-08-06 RX ORDER — LIDOCAINE HYDROCHLORIDE 20 MG/ML
INJECTION, SOLUTION INTRAVENOUS PRN
Status: DISCONTINUED | OUTPATIENT
Start: 2024-08-06 | End: 2024-08-06 | Stop reason: SDUPTHER

## 2024-08-06 RX ORDER — PROTAMINE SULFATE 10 MG/ML
INJECTION, SOLUTION INTRAVENOUS PRN
Status: DISCONTINUED | OUTPATIENT
Start: 2024-08-06 | End: 2024-08-06 | Stop reason: SDUPTHER

## 2024-08-06 RX ORDER — ONDANSETRON 2 MG/ML
INJECTION INTRAMUSCULAR; INTRAVENOUS PRN
Status: DISCONTINUED | OUTPATIENT
Start: 2024-08-06 | End: 2024-08-06 | Stop reason: SDUPTHER

## 2024-08-06 RX ORDER — HYDRALAZINE HYDROCHLORIDE 20 MG/ML
INJECTION INTRAMUSCULAR; INTRAVENOUS PRN
Status: DISCONTINUED | OUTPATIENT
Start: 2024-08-06 | End: 2024-08-06 | Stop reason: SDUPTHER

## 2024-08-06 RX ORDER — FENTANYL CITRATE 50 UG/ML
INJECTION, SOLUTION INTRAMUSCULAR; INTRAVENOUS PRN
Status: DISCONTINUED | OUTPATIENT
Start: 2024-08-06 | End: 2024-08-06 | Stop reason: SDUPTHER

## 2024-08-06 RX ORDER — LORAZEPAM 2 MG/ML
INJECTION INTRAMUSCULAR
Status: COMPLETED
Start: 2024-08-06 | End: 2024-08-06

## 2024-08-06 RX ORDER — DIPHENHYDRAMINE HYDROCHLORIDE 50 MG/ML
INJECTION INTRAMUSCULAR; INTRAVENOUS PRN
Status: DISCONTINUED | OUTPATIENT
Start: 2024-08-06 | End: 2024-08-06 | Stop reason: SDUPTHER

## 2024-08-06 RX ORDER — HEPARIN SODIUM 1000 [USP'U]/ML
INJECTION, SOLUTION INTRAVENOUS; SUBCUTANEOUS PRN
Status: DISCONTINUED | OUTPATIENT
Start: 2024-08-06 | End: 2024-08-06 | Stop reason: SDUPTHER

## 2024-08-06 RX ORDER — CEFAZOLIN SODIUM 1 G/3ML
INJECTION, POWDER, FOR SOLUTION INTRAMUSCULAR; INTRAVENOUS PRN
Status: DISCONTINUED | OUTPATIENT
Start: 2024-08-06 | End: 2024-08-06 | Stop reason: SDUPTHER

## 2024-08-06 RX ORDER — LIDOCAINE HYDROCHLORIDE 20 MG/ML
INJECTION, SOLUTION INFILTRATION; PERINEURAL PRN
Status: DISCONTINUED | OUTPATIENT
Start: 2024-08-06 | End: 2024-08-06 | Stop reason: SDUPTHER

## 2024-08-06 RX ORDER — HYDRALAZINE HYDROCHLORIDE 20 MG/ML
10 INJECTION INTRAMUSCULAR; INTRAVENOUS EVERY 4 HOURS PRN
Status: DISCONTINUED | OUTPATIENT
Start: 2024-08-06 | End: 2024-08-07 | Stop reason: HOSPADM

## 2024-08-06 RX ORDER — LORAZEPAM 2 MG/ML
0.5 INJECTION INTRAMUSCULAR EVERY 5 MIN PRN
Status: DISCONTINUED | OUTPATIENT
Start: 2024-08-06 | End: 2024-08-06 | Stop reason: HOSPADM

## 2024-08-06 RX ORDER — PANTOPRAZOLE SODIUM 40 MG/1
40 TABLET, DELAYED RELEASE ORAL
Status: DISCONTINUED | OUTPATIENT
Start: 2024-08-06 | End: 2024-08-07 | Stop reason: HOSPADM

## 2024-08-06 RX ORDER — DROPERIDOL 2.5 MG/ML
0.62 INJECTION, SOLUTION INTRAMUSCULAR; INTRAVENOUS ONCE
Status: COMPLETED | OUTPATIENT
Start: 2024-08-06 | End: 2024-08-06

## 2024-08-06 RX ADMIN — FENTANYL CITRATE 100 MCG: 50 INJECTION, SOLUTION INTRAMUSCULAR; INTRAVENOUS at 13:45

## 2024-08-06 RX ADMIN — DROPERIDOL 0.62 MG: 2.5 INJECTION, SOLUTION INTRAMUSCULAR; INTRAVENOUS at 15:12

## 2024-08-06 RX ADMIN — HEPARIN SODIUM 5000 UNITS: 1000 INJECTION INTRAVENOUS; SUBCUTANEOUS at 14:30

## 2024-08-06 RX ADMIN — SODIUM CHLORIDE: 9 INJECTION, SOLUTION INTRAVENOUS at 14:50

## 2024-08-06 RX ADMIN — CEFAZOLIN 2 G: 1 INJECTION, POWDER, FOR SOLUTION INTRAMUSCULAR; INTRAVENOUS at 14:00

## 2024-08-06 RX ADMIN — LEVOTHYROXINE SODIUM 88 MCG: 0.09 TABLET ORAL at 06:17

## 2024-08-06 RX ADMIN — HYDROCORTISONE SODIUM SUCCINATE 100 MG: 100 INJECTION, POWDER, FOR SOLUTION INTRAMUSCULAR; INTRAVENOUS at 13:45

## 2024-08-06 RX ADMIN — LIDOCAINE HYDROCHLORIDE 100 MG: 20 INJECTION, SOLUTION INFILTRATION; PERINEURAL at 13:45

## 2024-08-06 RX ADMIN — DIAZEPAM 5 MG: 2 TABLET ORAL at 12:35

## 2024-08-06 RX ADMIN — MINOXIDIL 2.5 MG: 2.5 TABLET ORAL at 07:51

## 2024-08-06 RX ADMIN — CLONIDINE HYDROCHLORIDE 0.1 MG: 0.1 TABLET ORAL at 23:25

## 2024-08-06 RX ADMIN — NIFEDIPINE 60 MG: 60 TABLET, EXTENDED RELEASE ORAL at 07:51

## 2024-08-06 RX ADMIN — ONDANSETRON 4 MG: 2 INJECTION INTRAMUSCULAR; INTRAVENOUS at 10:38

## 2024-08-06 RX ADMIN — SODIUM CHLORIDE, PRESERVATIVE FREE 10 ML: 5 INJECTION INTRAVENOUS at 20:22

## 2024-08-06 RX ADMIN — CLONIDINE HYDROCHLORIDE 0.1 MG: 0.1 TABLET ORAL at 16:57

## 2024-08-06 RX ADMIN — WATER 2000 MG: 1 INJECTION INTRAMUSCULAR; INTRAVENOUS; SUBCUTANEOUS at 11:29

## 2024-08-06 RX ADMIN — PROPOFOL 200 MG: 10 INJECTION, EMULSION INTRAVENOUS at 13:45

## 2024-08-06 RX ADMIN — PROPOFOL 150 MCG/KG/MIN: 10 INJECTION, EMULSION INTRAVENOUS at 13:50

## 2024-08-06 RX ADMIN — PANTOPRAZOLE SODIUM 40 MG: 40 TABLET, DELAYED RELEASE ORAL at 07:51

## 2024-08-06 RX ADMIN — LORAZEPAM 0.5 MG: 2 INJECTION INTRAMUSCULAR at 15:25

## 2024-08-06 RX ADMIN — HYDRALAZINE HYDROCHLORIDE 10 MG: 20 INJECTION INTRAMUSCULAR; INTRAVENOUS at 10:39

## 2024-08-06 RX ADMIN — DIPHENHYDRAMINE HYDROCHLORIDE 50 MG: 50 INJECTION, SOLUTION INTRAMUSCULAR; INTRAVENOUS at 13:50

## 2024-08-06 RX ADMIN — PROTAMINE SULFATE 20 MG: 10 INJECTION, SOLUTION INTRAVENOUS at 14:48

## 2024-08-06 RX ADMIN — HYDRALAZINE HYDROCHLORIDE 10 MG: 20 INJECTION, SOLUTION INTRAMUSCULAR; INTRAVENOUS at 14:52

## 2024-08-06 RX ADMIN — ASPIRIN 81 MG 81 MG: 81 TABLET ORAL at 07:51

## 2024-08-06 RX ADMIN — LANTHANUM CARBONATE 1000 MG: 500 TABLET, CHEWABLE ORAL at 18:00

## 2024-08-06 RX ADMIN — ONDANSETRON 4 MG: 2 INJECTION INTRAMUSCULAR; INTRAVENOUS at 13:45

## 2024-08-06 RX ADMIN — TEMAZEPAM 30 MG: 15 CAPSULE ORAL at 20:19

## 2024-08-06 RX ADMIN — SODIUM CHLORIDE, PRESERVATIVE FREE 10 ML: 5 INJECTION INTRAVENOUS at 07:52

## 2024-08-06 RX ADMIN — SODIUM CHLORIDE: 9 INJECTION, SOLUTION INTRAVENOUS at 13:45

## 2024-08-06 RX ADMIN — LIDOCAINE HYDROCHLORIDE 100 MG: 20 INJECTION, SOLUTION INTRAVENOUS at 13:45

## 2024-08-06 RX ADMIN — LORAZEPAM 0.5 MG: 2 INJECTION INTRAMUSCULAR; INTRAVENOUS at 15:25

## 2024-08-06 RX ADMIN — HEPARIN SODIUM 5000 UNITS: 5000 INJECTION INTRAVENOUS; SUBCUTANEOUS at 22:18

## 2024-08-06 RX ADMIN — MINOXIDIL 2.5 MG: 2.5 TABLET ORAL at 20:19

## 2024-08-06 ASSESSMENT — PAIN DESCRIPTION - LOCATION: LOCATION: GENERALIZED

## 2024-08-06 ASSESSMENT — PAIN SCALES - GENERAL
PAINLEVEL_OUTOF10: 4
PAINLEVEL_OUTOF10: 4

## 2024-08-06 ASSESSMENT — PAIN - FUNCTIONAL ASSESSMENT: PAIN_FUNCTIONAL_ASSESSMENT: NONE - DENIES PAIN

## 2024-08-06 NOTE — TELEPHONE ENCOUNTER
Patient's 7 day follow up cancelled with Ct/CV surgery due to inpatient admission. Patient has 14 day appointment with CHF on 8/13.

## 2024-08-06 NOTE — PLAN OF CARE
Patient down at cath lab for pacemaker.  Patient no longer needs ICU monitoring after pacemaker is placed.  Transfer order placed.  Patient will be staying in the same room.  Hospitalist, Dr Lewis, contacted via Nar[facility] regarding the transfer.    Electronically Signed by  Vladimir Chapman DO, MBA  PGY-2 Internal Medicine Resident  [facility]  On 8/6/2024 at 3:06 PM    This report has been created using voice recognition software. It may contain minor errors which are inherent in voice recognition technology

## 2024-08-06 NOTE — CARE COORDINATION
8/6/24, 8:52 AM EDT    DISCHARGE ON GOING EVALUATION    Tenisha SEGAL [facility] day: 2  Location: 3A-06/006-A Reason for admit: Syncope, near [R55]     Procedures:   8/6 PPM with Dr. Recinos scheduled.     Imaging since last note: None    Barriers to Discharge: Transferred to ICU for possible need for transvenous pacing overnight, was not needed. Intensivist, Nephrology and Cardiology/EP following. Pt is scheduled for PPM placement at noon today.     PCP: Axel Evans APRN  Readmission Risk Score: 16.7    Patient Goals/Plan/Treatment Preferences: Plans home with  and adult granddaughter. Pt is current with [facility] for OP HD, MWF at 1430. Holter monitor. Denies need for HH at this time. Monitor.

## 2024-08-06 NOTE — ANESTHESIA PRE PROCEDURE
by 2D imaging, howevere, mean gradient elevated likely related to heart rate and regurgitant flow. MV mean gradient is 11 mmHg.  ·  Tricuspid Valve: Mild regurgitation. The estimated RVSP is 64 mmHg.  ·  Left Atrium: Left atrium is moderately dilated.  ·  Right Atrium: Lead present in the right atrium. Right atrium is moderately dilated.  ·  IVC/SVC: IVC diameter is less than or equal to 21 mm and decreases greater than 50% during inspiration; therefore the estimated right atrial pressure is normal (~3 mmHg). IVC size is normal.  ·  Image quality is adequate.       Neuro/Psych:               GI/Hepatic/Renal:             Endo/Other:                     Abdominal:             Vascular:          Other Findings:         Anesthesia Plan      MAC     ASA 3       Induction: intravenous.      Anesthetic plan and risks discussed with patient.                      John Delgado, APRN - CRNA   8/6/2024

## 2024-08-06 NOTE — BRIEF OP NOTE
Brief Postoperative Note    Date:   8/6/2024  Patient name: Tenisha Solis  YOB: 1955  Sex: female   MRN:   [id number]    PCP: Axel Evans APRN     Procedure: Micra leadless pacemaker implantation.     Pre-Op Diagnosis: Intermittent AV block.     Post-Op Diagnosis: Same    Surgeon: Arelis Recinos MD, MRCP, FACC, FHRS    Assistant: Nabil GOLDBERG     Anesthesia/sedation: MAC.     Estimated Blood Loss (mL): Minimal    Complications: None    Recommendations:  See orders in Epic.  Bed rest for 6 hours.  Watch access site/s for bleeding or swelling.  Hold pressure if bleeding or swelling.  FO8 suture as per MARILYN orders          Electronically signed by Arelis Recinos MD, FACC, FHRS on 8/6/2024 at 5:15 PM

## 2024-08-06 NOTE — TELEPHONE ENCOUNTER
Patient was scheduled for a 7 day post TAVR appt on 8/6/24 with Shivam Manzano. This appt has been cancelled due to patient being admitted and needing a PPM. Permanent pacemaker is being implanted on 8/6/24.

## 2024-08-06 NOTE — ANESTHESIA POSTPROCEDURE EVALUATION
Department of Anesthesiology  Postprocedure Note    Patient: Tenisha Solis  MRN: [id number]  YOB: 1955  Date of evaluation: 8/6/2024    Procedure Summary       Date: 08/06/24 Room / Location: [facility] CATH LAB  / [facility] CARDIAC CATH LAB    Anesthesia Start: 1319 Anesthesia Stop: 1501    Procedure: Insert PPM single ventricular Diagnosis:       Syncope      (Syncope [R55])    Providers: Arelis Recinos MD Responsible Provider: Selvin Dorsey MD    Anesthesia Type: MAC ASA Status: 3            Anesthesia Type: No value filed.    Daniel Phase I: Daniel Score: 10    Daniel Phase II:      Anesthesia Post Evaluation    Patient location during evaluation: PACU  Patient participation: complete - patient participated  Level of consciousness: awake and alert  Airway patency: patent  Nausea & Vomiting: no nausea  Cardiovascular status: blood pressure returned to baseline and hemodynamically stable  Respiratory status: acceptable and spontaneous ventilation  Hydration status: euvolemic  Pain management: adequate    There were no known notable events for this encounter.

## 2024-08-06 NOTE — PLAN OF CARE
PATIENT NAME: MIAH ROJO  : 1955  DOS: 2024    This is a 68-year-old female who presented to [facility] ED on 2024 secondary to an episode of syncope.  Patient was notably admitted from 2024 until 2024 for TAVR.  The patient was noted to have a new left bundle branch block after the procedure.  Postoperatively on the day of admission, the patient had an episode of dizziness and shortness of breath and presented to the emergency department.  On monitor tracing, a 13-second episode of asystole with spontaneous return to normal sinus rhythm was identified.  The patient's labs were significant for an elevated troponin as well as BNP elevation.  The cardiology service was consulted and the electrophysiology service was subsequently consulted for permanent pacemaker placement.  Upon assessment by the electrophysiologist, there was concern for post TAVR alternating bundle branch block with conduction system damage requiring permanent pacemaker placement.  The patient's past medical history is significant for ESRD with left upper extremity fistula as well as heart failure with preserved ejection fraction for which the microsystem was recommended.  The patient's nephrologist was consulted for optimization of dialysis regimen in the context of the procedure.  The patient was transferred to the intensive care unit on 2024.  She underwent pacemaker placement on 2024 without complication and was transferred out of the ICU.    The patient was seen and evaluated at the bedside with her  present.  She states that she tolerated the procedure without complication.  Review of systems is positive for generalized sensation of warmth, sensation of feeling \"clammy\", intermittent nausea and vomiting that was also present prior to admission, constipation, straining with bowel movements,: Diarrhea.  Review of systems is negative for cough, sputum production, shortness of breath, abdominal

## 2024-08-06 NOTE — ADDENDUM NOTE
Addendum  created 08/06/24 1956 by John Delgado APRN - CRNA    Intraprocedure Meds edited, Orders acknowledged in Narrator, Review and Sign - Ready for Procedure

## 2024-08-07 VITALS
DIASTOLIC BLOOD PRESSURE: 53 MMHG | SYSTOLIC BLOOD PRESSURE: 132 MMHG | HEIGHT: 65 IN | WEIGHT: 135.27 LBS | HEART RATE: 62 BPM | BODY MASS INDEX: 22.54 KG/M2 | OXYGEN SATURATION: 100 % | RESPIRATION RATE: 16 BRPM | TEMPERATURE: 98.1 F

## 2024-08-07 PROBLEM — I45.5 SINUS PAUSE: Status: ACTIVE | Noted: 2024-08-07

## 2024-08-07 PROBLEM — Z95.0 S/P PLACEMENT OF LEADLESS CARDIAC PACEMAKER: Status: ACTIVE | Noted: 2024-08-07

## 2024-08-07 LAB
ANION GAP SERPL CALC-SCNC: 18 MEQ/L (ref 8–16)
BUN SERPL-MCNC: 32 MG/DL (ref 7–22)
CALCIUM SERPL-MCNC: 9.6 MG/DL (ref 8.5–10.5)
CHLORIDE SERPL-SCNC: 96 MEQ/L (ref 98–111)
CO2 SERPL-SCNC: 23 MEQ/L (ref 23–33)
CREAT SERPL-MCNC: 5.7 MG/DL (ref 0.4–1.2)
DEPRECATED RDW RBC AUTO: 49.5 FL (ref 35–45)
EKG ATRIAL RATE: 105 BPM
EKG P-R INTERVAL: 320 MS
EKG Q-T INTERVAL: 392 MS
EKG QRS DURATION: 150 MS
EKG QTC CALCULATION (BAZETT): 518 MS
EKG R AXIS: -38 DEGREES
EKG T AXIS: 120 DEGREES
EKG VENTRICULAR RATE: 105 BPM
ERYTHROCYTE [DISTWIDTH] IN BLOOD BY AUTOMATED COUNT: 14.9 % (ref 11.5–14.5)
GFR SERPL CREATININE-BSD FRML MDRD: 8 ML/MIN/1.73M2
GLUCOSE SERPL-MCNC: 91 MG/DL (ref 70–108)
HCT VFR BLD AUTO: 31.2 % (ref 37–47)
HGB BLD-MCNC: 9.7 GM/DL (ref 12–16)
MAGNESIUM SERPL-MCNC: 2.2 MG/DL (ref 1.6–2.4)
MCH RBC QN AUTO: 28.9 PG (ref 26–33)
MCHC RBC AUTO-ENTMCNC: 31.1 GM/DL (ref 32.2–35.5)
MCV RBC AUTO: 92.9 FL (ref 81–99)
PHOSPHATE SERPL-MCNC: 4.5 MG/DL (ref 2.4–4.7)
PLATELET # BLD AUTO: 286 THOU/MM3 (ref 130–400)
PMV BLD AUTO: 10.2 FL (ref 9.4–12.4)
POTASSIUM SERPL-SCNC: 4.7 MEQ/L (ref 3.5–5.2)
RBC # BLD AUTO: 3.36 MILL/MM3 (ref 4.2–5.4)
SODIUM SERPL-SCNC: 137 MEQ/L (ref 135–145)
WBC # BLD AUTO: 8.5 THOU/MM3 (ref 4.8–10.8)

## 2024-08-07 PROCEDURE — 6360000002 HC RX W HCPCS

## 2024-08-07 PROCEDURE — 85027 COMPLETE CBC AUTOMATED: CPT

## 2024-08-07 PROCEDURE — 99232 SBSQ HOSP IP/OBS MODERATE 35: CPT | Performed by: NURSE PRACTITIONER

## 2024-08-07 PROCEDURE — 6370000000 HC RX 637 (ALT 250 FOR IP)

## 2024-08-07 PROCEDURE — 84100 ASSAY OF PHOSPHORUS: CPT

## 2024-08-07 PROCEDURE — 36415 COLL VENOUS BLD VENIPUNCTURE: CPT

## 2024-08-07 PROCEDURE — 80048 BASIC METABOLIC PNL TOTAL CA: CPT

## 2024-08-07 PROCEDURE — 93010 ELECTROCARDIOGRAM REPORT: CPT | Performed by: INTERNAL MEDICINE

## 2024-08-07 PROCEDURE — 83735 ASSAY OF MAGNESIUM: CPT

## 2024-08-07 PROCEDURE — 90935 HEMODIALYSIS ONE EVALUATION: CPT

## 2024-08-07 PROCEDURE — 99232 SBSQ HOSP IP/OBS MODERATE 35: CPT | Performed by: INTERNAL MEDICINE

## 2024-08-07 RX ORDER — METOPROLOL SUCCINATE 25 MG/1
25 TABLET, EXTENDED RELEASE ORAL DAILY
Qty: 30 TABLET | Refills: 3 | Status: SHIPPED | OUTPATIENT
Start: 2024-08-07

## 2024-08-07 RX ORDER — METOPROLOL SUCCINATE 25 MG/1
25 TABLET, EXTENDED RELEASE ORAL DAILY
Status: DISCONTINUED | OUTPATIENT
Start: 2024-08-07 | End: 2024-08-07 | Stop reason: HOSPADM

## 2024-08-07 RX ADMIN — HEPARIN SODIUM 5000 UNITS: 5000 INJECTION INTRAVENOUS; SUBCUTANEOUS at 12:28

## 2024-08-07 RX ADMIN — LEVOTHYROXINE SODIUM 88 MCG: 0.09 TABLET ORAL at 05:52

## 2024-08-07 RX ADMIN — PANTOPRAZOLE SODIUM 40 MG: 40 TABLET, DELAYED RELEASE ORAL at 12:29

## 2024-08-07 RX ADMIN — CLONIDINE HYDROCHLORIDE 0.1 MG: 0.1 TABLET ORAL at 12:30

## 2024-08-07 RX ADMIN — ASPIRIN 81 MG 81 MG: 81 TABLET ORAL at 12:28

## 2024-08-07 RX ADMIN — NIFEDIPINE 60 MG: 60 TABLET, EXTENDED RELEASE ORAL at 12:30

## 2024-08-07 RX ADMIN — CINACALCET HYDROCHLORIDE 30 MG: 30 TABLET, FILM COATED ORAL at 12:29

## 2024-08-07 NOTE — PROGRESS NOTES
Hospitalist Progress Note      Patient:  Tenisha Solis 68 y.o. female     Unit/Bed:4D-16/016-A    Date of Admission: 8/4/2024      ASSESSMENT AND PLAN    Active Problems    Symptomatic sinus slade   Hx of Recent TAVR on 7/29   ESRD on HD , on M W F  Hypothyroidism   Syncope , presented on admission, work up in progress cardiology on the case  HTN    Due to symptomatic bradycardia , spoke with dr. Potts , will transfer her to ICU for possible pressors, they plan on placing a PPM tomorrow by dr. Recinos . , may need temporary based on course overnight, hold all chronotropic agents. Spoek with pt and spouse along with 3B staff     Chronic Conditions (reviewed and stable unless otherwise stated)  Hx of HTN, recent  LBBB  post TAVR , ESRD on HD       LDA: []CVC / []PICC / []Midline / []Valdez / []Drains / []Mediport / []None  Antibiotics:   Steroids:   Labs (still needed?): []Yes / []No  IVF (still needed?): []Yes / []No    Level of care: [x]Step Down / []Med-Surg  Bed Status: [x]Inpatient / []Observation  Telemetry: [x]Yes / []No  PT/OT: []Yes / []No    DVT Prophylaxis: [] Lovenox / [] Heparin / [] SCDs / [] Already on Systemic Anticoagulation / [] None   Code status: Full Code     Expected discharge date:   TBD  Disposition:  possibly home once stable     ===================================================================    Chief Complaint:    Subjective (past 24 hours):     pt  with dizziness today and had low HR , low as 35 , now upto 59 . Spouse at bed side, BP is markedly elevated, had HD today , her scheduled day.            Medications:      Infusion Medications    sodium chloride      Scheduled Medications    sodium chloride flush  5-40 mL IntraVENous 2 times per day    heparin (porcine)  5,000 Units SubCUTAneous 3 times per day    aspirin  81 mg Oral Daily    cinacalcet  30 mg Oral BID with meals    [Held by provider] cloNIDine  0.1 mg Oral TID    lanthanum  1,000 mg Oral TID    levothyroxine  88 mcg Oral 
1510 Patient arrived to PACU. Patient arousable to voice. Patient denies pain at this time. Incision dressing CDI. No drainage, swelling, or hematoma present. Respirations even and unlabored. VSS.    1512  Patient nauseous at this time. 0.625 mg of Droperidol given at this time.    1525 Patient restless and anxious. 0.5 mg of Ativan given at this time.    1532 Report called to 3A RN.    1545 Patient meets criteria to discharge from PACU. Patient transported to 3A06 in stable condition.      
Cardiology Progress Note      Patient:  Tenisha Solis  YOB: 1955  MRN: [id number]   Acct: [id number]  Admit Date:  8/4/2024  Primary Cardiologist: Arabella  Seen by Dr. Potts / Jorje    Per prior cardiology consult note-  CHIEF COMPLAINT: Syncope         HPI: This is a pleasant 68 y.o. female with a PMHx of severe AS s/p TAVR (07/29/24), HFpEF (60-65%), ESRD on HD (needing transplant), HTN, hypothyroid, anxiety, gout who presented to [facility] after syncopal episode on 08/04/24. Patient had uncomplicated TAVR procedure on 07/29/24 with Dr. Kang. Temporary pacemaker was removed the following day without complication. New LBBB post procedure, but no high grade AVB on telemetry. 24 hour post TAVR Echo was stable. She was discharged home on 07/30/24 with 30-day event monitor d/t new LBBB. Patient reports that she has been feeling \"not well\" since the procedure. Endorses feeling generally weak and slightly diaphoretic. Denies having any chest pain or palpitations. Reports that she started to feel short of breath on Sunday before her syncopal episode. States she was sitting down on her couch watching a movie and then can't remember what happened after that. She states her  told her she was \"twitching\" while she was down. Currently, she continues to feel weak and tired. Denies chest pain, palpitations, shortness of breath, or orthopnea.     Per chart review, patient was transferred from [facility] last night. Monitor tracing was obtained and revealed 13 seconds of asystole with spontaneous return to NSR. Labs revealed: Elevated troponin to 0.47, elevated Pro-BNP to 720130, stable Cr, WBC WNL. CXR suggestive of CHF.         Subjective (Events in last 24 hours):   Pt sitting at bedside   No dizziness or lightheadedness     Tele paced with occ PVC    VSS      Objective:   BP (!) 132/53   Pulse 62   Temp 98.1 °F (36.7 °C) (Oral)   Resp 16   Ht 1.651 m (5' 5\")   Wt 61.4 kg (135 lb 4.4 oz)   SpO2 100%   BMI 
[facility]   Nephrology progress note  8/6/2024, 8:35 AM      Pt Name:    Tenisha Solis  MRN:     [id number]     YOB: 1955  Admit Date:    8/4/2024  7:41 PM    Chief Complaint: Nephrology following for ESRD and HD    Subjective:  Patient was seen and examined this morning  No chest pain or shortness of breath  Feels okay    Objective:  24HR INTAKE/OUTPUT:    Intake/Output Summary (Last 24 hours) at 8/6/2024 0835  Last data filed at 8/5/2024 1124  Gross per 24 hour   Intake 400 ml   Output 2900 ml   Net -2500 ml         I/O last 3 completed shifts:  In: 400   Out: 2900   No intake/output data recorded.   Admission weight: 67.1 kg (147 lb 14.9 oz)  Wt Readings from Last 3 Encounters:   08/06/24 55.8 kg (123 lb 0.3 oz)   07/29/24 61 kg (134 lb 7.7 oz)   07/16/24 61.2 kg (135 lb)        Vitals :   Vitals:    08/06/24 0600 08/06/24 0700 08/06/24 0800 08/06/24 0810   BP: (!) 158/43 (!) 178/71 (!) 169/50    Pulse: 54 58 57 57   Resp: 18 20 20 16   Temp:    97.6 °F (36.4 °C)   TempSrc:    Oral   SpO2:  95%  95%   Weight: 55.8 kg (123 lb 0.3 oz)      Height:           Physical examination  General Appearance: alert and cooperative with exam, appears comfortable, no distress  Mouth/Throat: Oral mucosa moist  Neck: No JVD  Lungs: Air entry B/L, no rales, no use of accessory muscles  Heart:  S1, S2 heard  GI: soft, non-tender, no guarding  Extremities: no sig LE edema    Medications:  Infusion:    sodium chloride       Meds:    pantoprazole  40 mg Oral QAM AC    atropine  1 mg IntraVENous Once    sodium chloride flush  5-40 mL IntraVENous 2 times per day    heparin (porcine)  5,000 Units SubCUTAneous 3 times per day    aspirin  81 mg Oral Daily    cinacalcet  30 mg Oral BID with meals    [Held by provider] cloNIDine  0.1 mg Oral TID    lanthanum  1,000 mg Oral TID    levothyroxine  88 mcg Oral Daily    minoxidil  2.5 mg Oral BID    NIFEdipine  60 mg Oral Daily     Meds prn: hydrALAZINE, 
Patient transported to ICU with transport pack by celia RN and Lucía LIVINGSTON. All belongings were sent with patient. Report given to Anne LIVINGSTON.  
Pt discharge instructions reviewed-education included what to do in case of emergency, care of cath site-pt voiced understanding no further questions or concerns at this time. IV removed, all pt belongings gathered and sent with pt. Pt escorted via wheelchair out to ride home with spouse.  
Transfer from [facility]   sob last night and this am, had syncopal episode-fam noted poss shaking with it, EMS states came right out of it no post ictal, just had a TAVR Mon , wearing a Holter monitor had a 13 sec pause of asystole, trop 0.47 CR 5.3 chest shows mild congestion, cont with sl sob EKG NSR with first degree AVB and LBBB 187/81 98.4 64 24 94%, is a HD patient as well Dr Tatum adm     
hydrALAZINE, sodium chloride flush, sodium chloride, ondansetron **OR** ondansetron, polyethylene glycol, acetaminophen **OR** acetaminophen, diazePAM, temazepam     Lab Data :  CBC:   Recent Labs     08/05/24 2234 08/06/24 1106 08/07/24  0817   WBC 6.0 7.2 8.5   HGB 10.0* 10.8* 9.7*   HCT 31.5* 33.7* 31.2*    276 286       CMP:  Recent Labs     08/05/24 2234 08/06/24 1106 08/07/24  0817    136 137   K 4.3 5.2 4.7   CL 94* 95* 96*   CO2 28 23 23   BUN 19 24* 32*   CREATININE 3.8* 4.6* 5.7*   GLUCOSE 92 85 91   CALCIUM 9.1 9.5 9.6   MG 2.0 2.2 2.2   PHOS  --  4.1 4.5       Hepatic:   Recent Labs     08/05/24 2234   AST 11   ALT <5*   BILITOT 0.4   ALKPHOS 121           Assessment and Plan:  ESRD on HD  Plan hemodialysis treatment today and continue dialysis Mondays, Wednesdays and Fridays  Overall stable electrolytes and volume status  Labs noted  Mild hyperkalemia: Monitor potassium  Anemia in ESRD: Add low dose Retacrit if hemoglobin drops further  Syncope  S/p recent TAVR: Status post PPM      D/W patient     Ezekiel Arceo MD  [facility]    This report has been created using voice recognition software. It may contain minor errors which are inherent in voice recognition technology  
inpatient.    8/6/2024: No acute events overnight.  Resting comfortably in bed.  Going for pacemaker today.    Past Medical History: AS s/p TAVR, hypertension, nonobstructive CAD, HFpEF, ESRD on HD, hypothyroidism.  Family History: N/A.  Social History: No tobacco use, no alcohol use, no drug use.    ROS   Denies fever, chills, weight loss, chest pain, shortness of breath, nausea, vomiting, diarrhea, urinary symptoms.  Denying abdominal pain.  Denies any dizziness/lightheadedness at this time while laying in bed.    Scheduled Meds:   pantoprazole  40 mg Oral QAM AC    ceFAZolin  2,000 mg IntraVENous Once    atropine  1 mg IntraVENous Once    sodium chloride flush  5-40 mL IntraVENous 2 times per day    heparin (porcine)  5,000 Units SubCUTAneous 3 times per day    aspirin  81 mg Oral Daily    cinacalcet  30 mg Oral BID with meals    [Held by provider] cloNIDine  0.1 mg Oral TID    lanthanum  1,000 mg Oral TID    levothyroxine  88 mcg Oral Daily    minoxidil  2.5 mg Oral BID    NIFEdipine  60 mg Oral Daily     Continuous Infusions:   sodium chloride         PHYSICAL EXAMINATION:  T: 97.6.  P: 63. RR: 16. B/P: 170/46. O2 Sat: 95% on room air.  I/O: 8/5 = 400/2900  Body mass index is 20.47 kg/m².   GCS:   15  General:   Lying in bed, alert and oriented, answering questions appropriately, no family at bedside  HEENT:  normocephalic and atraumatic.  No scleral icterus. PERR  Neck: supple.  No Thyromegaly.  Lungs: clear to auscultation.  No retractions  Cardiac: RRR. IRENE in LUSB.  No JVD.  Abdomen: soft.  Nontender.  Extremities:  No clubbing, cyanosis, or edema x 4.    Vasculature: capillary refill < 3 seconds. Palpable dorsalis pedis pulses.  Skin:  warm and dry.  Psych:  Alert and oriented x3.  Affect appropriate  Lymph:  No supraclavicular adenopathy.  Neurologic:  No focal deficit. No seizures.    Data: (All radiographs, tracings, PFTs, and imaging are personally viewed and interpreted unless otherwise noted).   BMP

## 2024-08-07 NOTE — FLOWSHEET NOTE
3 hours of 3.5 hour treatment completed, treatment time decreased per patient request. Dr. Arceo informed. 2.5 liters  net uf. pressure held both cannulation sites x 10 minutes, dressing dry and intact upon discharge from unit. Report called to primary nurse.   08/07/24 0820 08/07/24 1140   Vital Signs   BP (!) 141/67 (!) 144/66   Temp 98 °F (36.7 °C) 98 °F (36.7 °C)   Pulse 63 60   Respirations 14 16   SpO2 97 % 99 %   Weight - Scale 63.8 kg (140 lb 10.5 oz) 61.4 kg (135 lb 4.4 oz)   Weight Method Bed scale Bed scale   Percent Weight Change 14.34 -3.82   Post-Hemodialysis Assessment   Post-Treatment Procedures  --  Blood returned;Access bleeding time < 10 minutes   Machine Disinfection Process  --  Acid/Vinegar Clean;Heat Disinfect;Exterior Machine Disinfection   Blood Volume Processed (Liters)  --  68.5 L   Dialyzer Clearance  --  Lightly streaked   Duration of Treatment (minutes)  --  180 minutes   Heparin Amount Administered During Treatment (mL)  --  0 mL   Hemodialysis Intake (ml)  --  400 ml   Hemodialysis Output (ml)  --  2900 ml   NET Removed (ml)  --  2500

## 2024-08-07 NOTE — PLAN OF CARE
Problem: Discharge Planning  Goal: Discharge to home or other facility with appropriate resources  Outcome: Progressing  Flowsheets (Taken 8/6/2024 2000)  Discharge to home or other facility with appropriate resources:   Identify barriers to discharge with patient and caregiver   Arrange for needed discharge resources and transportation as appropriate   Identify discharge learning needs (meds, wound care, etc)     Problem: Safety - Adult  Goal: Free from fall injury  Outcome: Progressing     Problem: Pain  Goal: Verbalizes/displays adequate comfort level or baseline comfort level  Outcome: Progressing  Flowsheets (Taken 8/6/2024 2000)  Verbalizes/displays adequate comfort level or baseline comfort level:   Encourage patient to monitor pain and request assistance   Assess pain using appropriate pain scale     Problem: Cardiovascular - Adult  Goal: Maintains optimal cardiac output and hemodynamic stability  Outcome: Progressing  Flowsheets (Taken 8/6/2024 2000)  Maintains optimal cardiac output and hemodynamic stability:   Monitor blood pressure and heart rate   Monitor urine output and notify Licensed Independent Practitioner for values outside of normal range   Assess for signs of decreased cardiac output  Goal: Absence of cardiac dysrhythmias or at baseline  Outcome: Progressing  Flowsheets (Taken 8/6/2024 2000)  Absence of cardiac dysrhythmias or at baseline:   Monitor cardiac rate and rhythm   Assess for signs of decreased cardiac output     Problem: Skin/Tissue Integrity - Adult  Goal: Skin integrity remains intact  Outcome: Progressing  Flowsheets (Taken 8/6/2024 2000)  Skin Integrity Remains Intact:   Monitor for areas of redness and/or skin breakdown   Assess vascular access sites hourly  Goal: Incisions, wounds, or drain sites healing without S/S of infection  Outcome: Progressing     Problem: Genitourinary - Adult  Goal: Absence of urinary retention  Outcome: Progressing  Flowsheets (Taken 8/6/2024

## 2024-08-07 NOTE — CARE COORDINATION
8/7/24, 1:48 PM EDT    Patient goals/plan/ treatment preferences discussed by  and .  Patient goals/plan/ treatment preferences reviewed with patient/ family.  Patient/ family verbalize understanding of discharge plan and are in agreement with goal/plan/treatment preferences.  Understanding was demonstrated using the teach back method.  AVS provided by RN at time of discharge, which includes all necessary medical information pertaining to the patients current course of illness, treatment, post-discharge goals of care, and treatment preferences. Spoke with patient and , plans to return home. Will [facility] HD MWF at [facility]. Denied home needs. Pt verbalized understanding and gave permission for possible discharge within 4 hours of receiving IMM.      Services At/After Discharge: None

## 2024-08-08 ENCOUNTER — COMMUNITY CARE MANAGEMENT (OUTPATIENT)
Facility: CLINIC | Age: 69
End: 2024-08-08

## 2024-08-08 PROBLEM — I25.10 CORONARY ARTERY DISEASE WITHOUT ANGINA PECTORIS: Status: ACTIVE | Noted: 2024-08-08

## 2024-08-08 PROBLEM — E03.9 HYPOTHYROIDISM: Status: ACTIVE | Noted: 2024-08-08

## 2024-08-08 NOTE — DISCHARGE SUMMARY
Hospital Medicine Discharge Summary      Patient Identification:   Tenisha Solis   : 1955  MRN: [id number]   Account: [id number]      Patient's PCP: Axel Evans APRN    Admit Date: 2024     Discharge Date: 2024      Admitting Physician: Balta Deng DO     Discharge Physician: MEGAN Longoria - CNP     Discharge Diagnoses:    Symptomatic bradycardia resolved    Patient is s/p Micra leadless pacemaker implantation.     Follow with PPM clinic 1 week  per cardiology recommendations      2.AS s/p TAVR 2024:  On Aspirin      3.HFpEF, chronic: Echo 2024 shows EF 60 to 65%.      4.Nonobstructive CAD:   On Aspirin    5.Hypertension:   Continue home meds    6.ESRD on HD:  MWF.  Left AV fistula.  Follow up with   Nephrology outpatient     7.Hypothyroidism:   Continue Levothyroxine    INITIAL H AND P AND ICU COURSE:  68-year-old female PMH AS s/p TAVR, ESRD on HD, hypertension, hypothyroid presenting after syncopal episode at home.  Patient just had TAVR 2024 w/ Dr. Kang.  She was sitting at home watching TV when she became dizzy and short of breath and ultimately passed out.  On review of home cardiac event monitor, patient had 13-second pause that corresponded to this syncopal episode.  Patient has had continued episodes of bradycardia with associated dizziness while inpatient and pauses have been noted.  She was transferred to ICU for further evaluation monitoring.  Cardiology already evaluated and planning for pacemaker placement while inpatient.     2024: No acute events overnight.  Resting comfortably in bed.  Going for pacemaker today.        Active Hospital Problems    Diagnosis Date Noted    S/P placement of leadless cardiac pacemaker [Z95.0] 2024     Priority: High    Sinus pause [I45.5] 2024     Priority: High    Bradycardia [R00.1] 2024    AV block, 3rd degree (HCC) [I44.2] 2024    Syncope [R55] 2024    ESRD (end stage renal

## 2024-08-13 ENCOUNTER — OFFICE VISIT (OUTPATIENT)
Dept: CARDIOLOGY CLINIC | Age: 69
End: 2024-08-13

## 2024-08-13 ENCOUNTER — NURSE ONLY (OUTPATIENT)
Dept: CARDIOLOGY CLINIC | Age: 69
End: 2024-08-13
Payer: MEDICARE

## 2024-08-13 VITALS
SYSTOLIC BLOOD PRESSURE: 122 MMHG | OXYGEN SATURATION: 99 % | HEIGHT: 65 IN | BODY MASS INDEX: 22.49 KG/M2 | WEIGHT: 135 LBS | HEART RATE: 66 BPM | DIASTOLIC BLOOD PRESSURE: 68 MMHG

## 2024-08-13 DIAGNOSIS — Z95.2 S/P TAVR (TRANSCATHETER AORTIC VALVE REPLACEMENT): Primary | ICD-10-CM

## 2024-08-13 DIAGNOSIS — Z99.2 ESRD (END STAGE RENAL DISEASE) ON DIALYSIS (HCC): ICD-10-CM

## 2024-08-13 DIAGNOSIS — N18.6 ESRD (END STAGE RENAL DISEASE) ON DIALYSIS (HCC): ICD-10-CM

## 2024-08-13 DIAGNOSIS — Z95.0 S/P PLACEMENT OF LEADLESS CARDIAC PACEMAKER: Primary | ICD-10-CM

## 2024-08-13 DIAGNOSIS — Z95.0 ARTIFICIAL PACEMAKER: ICD-10-CM

## 2024-08-13 PROCEDURE — 93279 PRGRMG DEV EVAL PM/LDLS PM: CPT | Performed by: NUCLEAR MEDICINE

## 2024-08-13 ASSESSMENT — ENCOUNTER SYMPTOMS
COUGH: 0
SHORTNESS OF BREATH: 0
ABDOMINAL DISTENTION: 0

## 2024-08-13 NOTE — PROGRESS NOTES
[employer] VR micra pacemaker     17.2% paced   8 years on device   Imped 470  Threshold 0.38 @ 0.24  R waves 5.2  Does c/o palpitations at times   Sees Soledad today     Waiting on her carelink monitor to arrive  Demo'd monitor for her

## 2024-08-13 NOTE — PROGRESS NOTES
Heart Failure Clinic       Visit Date: 8/13/2024  Cardiologist:  Dr. Kang  Primary Care Physician: Axel Hayes, APRN  Referred by: Jorge Luis    Tenisha Solis is a 68 y.o. female who presents today for:  Chief Complaint   Patient presents with    Valvular Heart Disease       HPI:   Tenisha Solis is a 68 y.o. female who presents to the office for a patient visit in the heart failure clinic.  Accompanied by   2 sons    TYPE HF: HFpEF   Cause:   Valves:  Severe AS s/p TAVR 7/28/24)  Device: s/p Micrapacer (8/  HX: ESRD (since 2020 - [facility], dialysis MWF), hip replacement (1/2024), colon resection d/t diverticulits (hx stoma), hx hernia (saw [facility], not repairable), hx cervical cancer (hx radiation, remission)      Was getting work up for kidney transplant and ECHO showing worsening AS - now pursuing TAVR prior to tx. (Son to donate)  Today: 135#  More fatigue, dizziness, swelling past few months  Not on diuretic - very little urine output  Sleeps in bed.  No orthopnea.       TODAY 8/2024 - f/u TAVR 7/28 - High BPs post, new LBBB.  30 day event placed.   8/4 - admitted for bradycardia s/p Micra pacer  Was suppose to start Toprol - never received apparently was put on after pt received AVS.  BP at home 120-160s.  Some palpitations,\"strong beats\" = doesn't feel like racing, fast.   Device check today showing HR 50-60s few elevated.   Hematoma RUQ - tender to touch - slowing getting smaller per pt      Past Medical History:   Diagnosis Date    Anxiety     Diarrhea     Dryness of periwound skin     Gout, joint     Hemodialysis patient (HCC)     Hernia of abdominal cavity     Hypertension     Kidney disease     Mouth dryness      Past Surgical History:   Procedure Laterality Date    AV FISTULA CREATION Left     CARDIAC PROCEDURE Bilateral 06/06/2024    Left and right heart cath / coronary angiography performed by Cristian Gomez MD at [facility] CARDIAC CATH LAB    CARDIAC PROCEDURE N/A 07/29/2024

## 2024-08-13 NOTE — PATIENT INSTRUCTIONS
You may receive a survey regarding the care you received during your visit.  Your input is valuable to us.  We encourage you to complete and return your survey.  We hope you will choose us in the future for your healthcare needs.    Your nurses today were Valentin.  Office hours:   Mon-Thurs 8-4:30  Friday 8-12  Phone: [phone]    Continue:  Continue current medications  Daily weights and record  Fluid restriction of 2 Liters per day  Limit sodium in diet to around 4471-7735 mg/day  Monitor BP  Activity as tolerated     Call the Heart Failure Clinic for any of the following symptoms:   Weight gain of 3 pounds in 1 day or 5 pounds in 1 week  Increased shortness of breath  Shortness of breath while laying down  Cough  Chest pain  Swelling in feet, ankles or legs  Bloating in abdomen  Fatigue

## 2024-08-15 ENCOUNTER — TELEPHONE (OUTPATIENT)
Dept: CARDIOLOGY CLINIC | Age: 69
End: 2024-08-15

## 2024-08-15 RX ORDER — METOPROLOL SUCCINATE 25 MG/1
25 TABLET, EXTENDED RELEASE ORAL DAILY
Qty: 30 TABLET | Refills: 3 | Status: SHIPPED | OUTPATIENT
Start: 2024-08-15

## 2024-08-16 NOTE — TELEPHONE ENCOUNTER
Let Tenisha know I was able to talk w/ Jorge Luis  Would like to start low dose Toprol 25/day  Rx sent to [facility]  Call if BPs start trending less than 110 or dizziness.

## 2024-08-29 ENCOUNTER — HOSPITAL ENCOUNTER (OUTPATIENT)
Age: 69
Discharge: HOME | End: 2024-08-31
Attending: INTERNAL MEDICINE
Payer: MEDICARE

## 2024-08-29 ENCOUNTER — HOSPITAL ENCOUNTER (OUTPATIENT)
Age: 69
Discharge: HOME | End: 2024-08-29
Attending: INTERNAL MEDICINE
Payer: MEDICARE

## 2024-08-29 VITALS
WEIGHT: 135 LBS | BODY MASS INDEX: 22.49 KG/M2 | HEIGHT: 65 IN | DIASTOLIC BLOOD PRESSURE: 68 MMHG | SYSTOLIC BLOOD PRESSURE: 122 MMHG

## 2024-08-29 DIAGNOSIS — I35.0 NONRHEUMATIC AORTIC VALVE STENOSIS: ICD-10-CM

## 2024-08-29 LAB
ANION GAP SERPL CALC-SCNC: 11 MEQ/L (ref 8–16)
BUN SERPL-MCNC: 28 MG/DL (ref 7–22)
CALCIUM SERPL-MCNC: 9.1 MG/DL (ref 8.5–10.5)
CHLORIDE SERPL-SCNC: 97 MEQ/L (ref 98–111)
CO2 SERPL-SCNC: 31 MEQ/L (ref 23–33)
CREAT SERPL-MCNC: 3.9 MG/DL (ref 0.4–1.2)
ECHO AO ASC DIAM: 3.8 CM
ECHO AO ASCENDING AORTA INDEX: 2.28 CM/M2
ECHO AV ACCELERATION TIME: 85 MS
ECHO AV AREA PEAK VELOCITY: 1.2 CM2
ECHO AV AREA VTI: 1.4 CM2
ECHO AV AREA/BSA PEAK VELOCITY: 0.7 CM2/M2
ECHO AV AREA/BSA VTI: 0.8 CM2/M2
ECHO AV MEAN GRADIENT: 12 MMHG
ECHO AV MEAN VELOCITY: 1.6 M/S
ECHO AV PEAK GRADIENT: 24 MMHG
ECHO AV PEAK VELOCITY: 2.5 M/S
ECHO AV VELOCITY RATIO: 0.4
ECHO AV VTI: 53.2 CM
ECHO BSA: 1.68 M2
ECHO EST RA PRESSURE: 5 MMHG
ECHO LA AREA 2C: 21.5 CM2
ECHO LA AREA 4C: 23.2 CM2
ECHO LA DIAMETER INDEX: 2.63 CM/M2
ECHO LA DIAMETER: 4.4 CM
ECHO LA MAJOR AXIS: 6.5 CM
ECHO LA MINOR AXIS: 5.9 CM
ECHO LA VOL BP: 66 ML (ref 22–52)
ECHO LA VOL MOD A2C: 65 ML (ref 22–52)
ECHO LA VOL MOD A4C: 64 ML (ref 22–52)
ECHO LA VOL/BSA BIPLANE: 40 ML/M2 (ref 16–34)
ECHO LA VOLUME INDEX MOD A2C: 39 ML/M2 (ref 16–34)
ECHO LA VOLUME INDEX MOD A4C: 38 ML/M2 (ref 16–34)
ECHO LV E' LATERAL VELOCITY: 5 CM/S
ECHO LV E' SEPTAL VELOCITY: 4 CM/S
ECHO LV EJECTION FRACTION BIPLANE: 58 % (ref 55–100)
ECHO LV FRACTIONAL SHORTENING: 31 % (ref 28–44)
ECHO LV INTERNAL DIMENSION DIASTOLE INDEX: 3.29 CM/M2
ECHO LV INTERNAL DIMENSION DIASTOLIC: 5.5 CM (ref 3.9–5.3)
ECHO LV INTERNAL DIMENSION SYSTOLIC INDEX: 2.28 CM/M2
ECHO LV INTERNAL DIMENSION SYSTOLIC: 3.8 CM
ECHO LV ISOVOLUMETRIC RELAXATION TIME (IVRT): 77 MS
ECHO LV IVSD: 1 CM (ref 0.6–0.9)
ECHO LV MASS 2D: 227.4 G (ref 67–162)
ECHO LV MASS INDEX 2D: 136.2 G/M2 (ref 43–95)
ECHO LV POSTERIOR WALL DIASTOLIC: 1.1 CM (ref 0.6–0.9)
ECHO LV RELATIVE WALL THICKNESS RATIO: 0.4
ECHO LVOT AREA: 2.8 CM2
ECHO LVOT AV VTI INDEX: 0.47
ECHO LVOT DIAM: 1.9 CM
ECHO LVOT MEAN GRADIENT: 2 MMHG
ECHO LVOT PEAK GRADIENT: 4 MMHG
ECHO LVOT PEAK VELOCITY: 1 M/S
ECHO LVOT STROKE VOLUME INDEX: 42.8 ML/M2
ECHO LVOT SV: 71.4 ML
ECHO LVOT VTI: 25.2 CM
ECHO MV A VELOCITY: 1.46 M/S
ECHO MV AREA VTI: 1 CM2
ECHO MV E DECELERATION TIME (DT): 356 MS
ECHO MV E VELOCITY: 1.29 M/S
ECHO MV E/A RATIO: 0.88
ECHO MV E/E' LATERAL: 25.8
ECHO MV E/E' RATIO (AVERAGED): 29.03
ECHO MV E/E' SEPTAL: 32.25
ECHO MV LVOT VTI INDEX: 2.98
ECHO MV MAX VELOCITY: 2 M/S
ECHO MV MEAN GRADIENT: 6 MMHG
ECHO MV MEAN VELOCITY: 1.2 M/S
ECHO MV PEAK GRADIENT: 16 MMHG
ECHO MV REGURGITANT PEAK GRADIENT: 96 MMHG
ECHO MV REGURGITANT PEAK VELOCITY: 4.9 M/S
ECHO MV VTI: 75.1 CM
ECHO PULMONARY ARTERY END DIASTOLIC PRESSURE: 6 MMHG
ECHO PV MAX VELOCITY: 0.9 M/S
ECHO PV PEAK GRADIENT: 3 MMHG
ECHO PV REGURGITANT MAX VELOCITY: 1.2 M/S
ECHO RIGHT VENTRICULAR SYSTOLIC PRESSURE (RVSP): 34 MMHG
ECHO RV INTERNAL DIMENSION: 3.2 CM
ECHO RV TAPSE: 2.4 CM (ref 1.7–?)
ECHO TV E WAVE: 0.4 M/S
ECHO TV REGURGITANT MAX VELOCITY: 2.71 M/S
ECHO TV REGURGITANT PEAK GRADIENT: 29 MMHG
GFR SERPL CREATININE-BSD FRML MDRD: 12 ML/MIN/1.73M2
GLUCOSE SERPL-MCNC: 93 MG/DL (ref 70–108)
POTASSIUM SERPL-SCNC: 4.6 MEQ/L (ref 3.5–5.2)
SODIUM SERPL-SCNC: 139 MEQ/L (ref 135–145)

## 2024-08-29 PROCEDURE — 93306 TTE W/DOPPLER COMPLETE: CPT

## 2024-08-29 PROCEDURE — 36415 COLL VENOUS BLD VENIPUNCTURE: CPT

## 2024-08-29 PROCEDURE — 80048 BASIC METABOLIC PNL TOTAL CA: CPT

## 2024-09-04 ENCOUNTER — TELEPHONE (OUTPATIENT)
Dept: CARDIOLOGY CLINIC | Age: 69
End: 2024-09-04

## 2024-09-04 ENCOUNTER — OFFICE VISIT (OUTPATIENT)
Dept: CARDIOLOGY CLINIC | Age: 69
End: 2024-09-04

## 2024-09-04 VITALS
DIASTOLIC BLOOD PRESSURE: 63 MMHG | HEIGHT: 65 IN | SYSTOLIC BLOOD PRESSURE: 114 MMHG | BODY MASS INDEX: 22.47 KG/M2 | OXYGEN SATURATION: 100 % | HEART RATE: 57 BPM

## 2024-09-04 DIAGNOSIS — I44.7 LEFT BUNDLE BRANCH BLOCK: ICD-10-CM

## 2024-09-04 DIAGNOSIS — Z95.2 S/P TAVR (TRANSCATHETER AORTIC VALVE REPLACEMENT): Primary | ICD-10-CM

## 2024-09-04 DIAGNOSIS — Z95.0 S/P PLACEMENT OF LEADLESS CARDIAC PACEMAKER: ICD-10-CM

## 2024-09-04 DIAGNOSIS — I44.30 ATRIOVENTRICULAR BLOCK: ICD-10-CM

## 2024-09-04 DIAGNOSIS — N18.6 ESRD (END STAGE RENAL DISEASE) ON DIALYSIS (HCC): ICD-10-CM

## 2024-09-04 DIAGNOSIS — Z99.2 ESRD (END STAGE RENAL DISEASE) ON DIALYSIS (HCC): ICD-10-CM

## 2024-09-04 DIAGNOSIS — I50.32 CHRONIC HEART FAILURE WITH PRESERVED EJECTION FRACTION (HCC): ICD-10-CM

## 2024-09-18 ENCOUNTER — TELEPHONE (OUTPATIENT)
Dept: CARDIOLOGY CLINIC | Age: 69
End: 2024-09-18

## 2024-09-20 DIAGNOSIS — R00.2 PALPITATIONS: Primary | ICD-10-CM

## 2024-09-20 RX ORDER — METOPROLOL SUCCINATE 25 MG/1
25 TABLET, EXTENDED RELEASE ORAL 2 TIMES DAILY
Qty: 60 TABLET | Refills: 3 | Status: SHIPPED | OUTPATIENT
Start: 2024-09-20

## 2024-10-15 ENCOUNTER — OFFICE VISIT (OUTPATIENT)
Dept: CARDIOLOGY CLINIC | Age: 69
End: 2024-10-15

## 2024-10-15 VITALS
SYSTOLIC BLOOD PRESSURE: 159 MMHG | HEART RATE: 95 BPM | HEIGHT: 65 IN | DIASTOLIC BLOOD PRESSURE: 92 MMHG | BODY MASS INDEX: 22.42 KG/M2 | WEIGHT: 134.6 LBS

## 2024-10-15 DIAGNOSIS — I44.7 LEFT BUNDLE BRANCH BLOCK: ICD-10-CM

## 2024-10-15 DIAGNOSIS — Z95.2 S/P TAVR (TRANSCATHETER AORTIC VALVE REPLACEMENT): Primary | ICD-10-CM

## 2024-10-15 DIAGNOSIS — Z95.0 S/P PLACEMENT OF LEADLESS CARDIAC PACEMAKER: ICD-10-CM

## 2024-10-15 DIAGNOSIS — I48.0 PAROXYSMAL ATRIAL FIBRILLATION (HCC): ICD-10-CM

## 2024-10-15 DIAGNOSIS — I44.30 ATRIOVENTRICULAR BLOCK: ICD-10-CM

## 2024-10-15 NOTE — PATIENT INSTRUCTIONS
Continue current medications as prescribed.    Stay as active as you can.     Eat heart healthy diet.     Follow-up with your PCP as scheduled.    Follow-up with Dr. Bell on 1/7/2025  as scheduled or sooner if need.     I will call after discussing the atrial fib with Dr. Recinos.

## 2024-10-15 NOTE — PROGRESS NOTES
[facility] [place] [facility]  [facility] CARDIOLOGY  [address]  [address]  [place] [address]  Dept: [phone]  Dept Fax: [phone]  Loc: [phone]    Visit Date: 10/15/2024    Ms. Solis is a 69 y.o. female  who presented for: evaluation 30 day post TAVR follow-up    Primary Cardiologist: Dr. Bell  Structural heart: Ha Kang MD    Chief Complaint   Patient presents with    Follow-up     Concerns of low BP    Dizziness    Shortness of Breath       HPI:   HPI    Couple phone calls to office - just not feeling right - Toprol XL was adjusted; then having dizziness and nausea; stopped. Feeling somewhat better on 9/18. Some recurrent palpitations - still just feeling \"off\" when spoke with patient on 10/18 - office appointment scheduled for today.  Need to interrogate PM - check for arrhythmias/general HR/PM function.     Last seen in office on 9/4/2024 per this writer for 30 day post TAVR follow-up - s/p PPM placement. Per office note:  Assessment/Plan   Severe Symptomatic Aortic Stenosis   Assessment/Plan   Severe Symptomatic Aortic Stenosis - s/p TAVR 7/29/24: Successful percutaneous transfemoral transcatheter aortic valve replacement with Evolut FX 29 facilittated by a 20 x 4.5 TruDilation pre-implantation balloon aortic valvuloplasty               - 30 day post TAVR follow-up echo notes device in appropriate position, no AS or PVL  New LBBB post TAVR - CHB on event monitor with 13 sec pause - syncopal event; Mircra pacemaker placed 8/6/24 - no recurrent episodes  pEF 55 - 60% per 9/24 echo; no evidence of decompensated HF  Mild to moderate MR per echo 9/24  Non-obstructive CAD  Palpitations - improved on Toprol XL  Hypotension - concern during HD - Nephrology managing anti-hypertensive meds     Overall doing well. Still with some palpitations - heart pounding - does take her breath momentarily. Did improve with Torprol XL. Discussed may need to titrate dose for control -

## 2024-10-17 ENCOUNTER — TELEPHONE (OUTPATIENT)
Dept: CARDIOLOGY CLINIC | Age: 69
End: 2024-10-17

## 2024-10-17 NOTE — TELEPHONE ENCOUNTER
Patient to get event monitor placed here at [facility]'s October 23rd at 0815 am.    Called [facility] to let them know

## 2024-10-17 NOTE — TELEPHONE ENCOUNTER
[facility] called can not place monitor until after 10/28/24    Is this ok?

## 2024-10-23 ENCOUNTER — HOSPITAL ENCOUNTER (OUTPATIENT)
Age: 69
Discharge: HOME | End: 2024-10-25
Attending: NURSE PRACTITIONER
Payer: MEDICARE

## 2024-10-23 DIAGNOSIS — I48.0 PAROXYSMAL ATRIAL FIBRILLATION (HCC): ICD-10-CM

## 2024-10-23 PROCEDURE — 93270 REMOTE 30 DAY ECG REV/REPORT: CPT

## 2024-10-29 ENCOUNTER — OFFICE VISIT (OUTPATIENT)
Age: 69
End: 2024-10-29

## 2024-10-29 VITALS
BODY MASS INDEX: 23.89 KG/M2 | DIASTOLIC BLOOD PRESSURE: 82 MMHG | SYSTOLIC BLOOD PRESSURE: 146 MMHG | WEIGHT: 143.4 LBS | HEIGHT: 65 IN | HEART RATE: 59 BPM

## 2024-10-29 DIAGNOSIS — E03.9 ACQUIRED HYPOTHYROIDISM: ICD-10-CM

## 2024-10-29 DIAGNOSIS — E21.3 HYPERPARATHYROIDISM (HCC): Primary | ICD-10-CM

## 2024-10-29 NOTE — PROGRESS NOTES
[facility] [place] [facility]  [facility] ENDOCRINOLOGY  [address]  [place] [address]  Dept: [phone]  Loc: [phone]     Visit Date: 10/29/2024    Tenisha Solis is a 69 y.o. female who presents today for:  Chief Complaint   Patient presents with    Hyperparathyroidism              Subjective:      HPI     Tenisha Solis is a 69 y.o. , female who comes for Follow up for hyperparathyroidism and hypothyroidism.  Axel Evans APRN  Tenisha Solis is being seen for hyperparathyroidism associated with hypercalcemia. The patient has end-stage renal disease and is on hemodialysis.  She has received zoledronic acid in the past for hypercalcemia.  The patient is now taking Cinacalcet 60 mg daily.  We have previously discussed surgery with this patient and she declined it.  The patient follows closely with nephrologist.    Please note that the patient is also being treated for hypothyroidism and she takes 88 mcg of Synthroid daily for 6 days and half tablet on the seventh day.  Her symptoms include constipation, dry skin and fatigue.  This patient recently underwent a syncopal episode due to bradycardia.  She is status post pacemaker insertion.  Past Medical History:   Diagnosis Date    Anxiety     Diarrhea     Dryness of periwound skin     Gout, joint     Hemodialysis patient (HCC)     Hernia of abdominal cavity     Hypertension     Kidney disease     Mouth dryness       Past Surgical History:   Procedure Laterality Date    AV FISTULA CREATION Left     CARDIAC PROCEDURE Bilateral 06/06/2024    Left and right heart cath / coronary angiography performed by Cristian Gomez MD at [facility] CARDIAC CATH LAB    CARDIAC PROCEDURE N/A 07/29/2024    Transcatheter aortic valve replacement performed by Ha Kang MD at [facility] CARDIAC CATH LAB    EP DEVICE PROCEDURE N/A 8/6/2024    Insert PPM single ventricular performed by Arelis Recinos MD at [facility] CARDIAC CATH LAB

## 2024-11-14 LAB
ANION GAP, EXTERNAL: 13
BUN, EXTERNAL: 41
BUN/CREATININE RATIO, EXTERNAL: ABNORMAL
CALCIUM, EXTERNAL: 9.3
CHLORIDE, EXTERNAL: 97
CO2, EXTERNAL: 31
CREATININE, EXTERNAL: 4.6
EGFR IF AFA, EXTERNAL: ABNORMAL
EGFR IF NONAFRICAN AMERICAN: ABNORMAL
GLUCOSE SER, EXTERNAL: 98
MAGNESIUM: 2.2 MG/DL
POTASSIUM, EXTERNAL: 4.5
SODIUM, EXTERNAL: 136
T4 FREE: 1.11
TSH SERPL DL<=0.05 MIU/L-ACNC: 1.77 UIU/ML
VITAMIN D 25-HYDROXY: 19.2
VITAMIN D2, 25 HYDROXY: ABNORMAL
VITAMIN D3,25 HYDROXY: ABNORMAL

## 2024-11-16 NOTE — RESULT ENCOUNTER NOTE
Refill Request     CONFIRM preferrred pharmacy with the patient.    If Mail Order Rx - Pend for 90 day refill.      Last Seen: Last Seen Department: 11/14/2024  Last Seen by PCP: 10/7/2024    Last Written: 1.4.24,3.4.24    If no future appointment scheduled, route STAFF MESSAGE with patient name to the  Pool for scheduling.      Next Appointment:   Future Appointments   Date Time Provider Department Center   2/10/2025  9:30 AM Anne Marie Badillo MD [facility] DEP       Message sent to  to schedule appt with patient?  N/A      Requested Prescriptions     Pending Prescriptions Disp Refills    dilTIAZem (CARDIZEM CD) 240 MG extended release capsule [Pharmacy Med Name: DILTIAZEM 24H ER(CD) 240 MG CP] 90 capsule 2     Sig: TAKE 1 CAPSULE BY MOUTH EVERY DAY    pentoxifylline (TRENTAL) 400 MG extended release tablet [Pharmacy Med Name: PENTOXIFYLLINE  MG TAB] 90 tablet 2     Sig: TAKE 1 TABLET BY MOUTH EVERY DAY       Vitamin D level is low.  Please confirm if patient is on vitamin D.  I called and she did not .

## 2024-11-18 ENCOUNTER — TELEPHONE (OUTPATIENT)
Age: 69
End: 2024-11-18

## 2024-11-18 NOTE — TELEPHONE ENCOUNTER
----- Message from Dr. Robert Crow MD sent at 11/16/2024  2:39 PM EST -----  Vitamin D level is low.  Please confirm if patient is on vitamin D.  I called and she did not .

## 2024-11-19 NOTE — TELEPHONE ENCOUNTER
Dr. Crow: Tell her to confirm and call me back.  --------------------  Pt will be calling back to confirm if she is receiving vitamin D at dialysis.

## 2024-11-20 LAB — PTH INTACT: 559

## 2024-11-26 ENCOUNTER — TELEPHONE (OUTPATIENT)
Dept: CARDIOLOGY CLINIC | Age: 69
End: 2024-11-26

## 2024-12-04 ENCOUNTER — TELEPHONE (OUTPATIENT)
Dept: CARDIOLOGY CLINIC | Age: 69
End: 2024-12-04

## 2024-12-04 NOTE — TELEPHONE ENCOUNTER
Pt calling for Event Monitor results-Anything needed?    Interpretation Summary    Normal sinus rhythm   Wide QRS complex possible bundle branch block  Short episodes of likely SVT, possible A fib   No sustained arrhythmias

## 2024-12-13 NOTE — PROGRESS NOTES
1200: Report called to [facility] on [facility]. Pt placed for transport.  1225: pt transported to [facility] in stable condition.    97.3

## 2025-01-07 ENCOUNTER — OFFICE VISIT (OUTPATIENT)
Dept: CARDIOLOGY CLINIC | Age: 70
End: 2025-01-07
Payer: MEDICARE

## 2025-01-07 VITALS
DIASTOLIC BLOOD PRESSURE: 83 MMHG | HEIGHT: 65 IN | BODY MASS INDEX: 22.99 KG/M2 | HEART RATE: 74 BPM | SYSTOLIC BLOOD PRESSURE: 152 MMHG | WEIGHT: 138 LBS

## 2025-01-07 DIAGNOSIS — Z95.0 PACEMAKER: ICD-10-CM

## 2025-01-07 DIAGNOSIS — I10 PRIMARY HYPERTENSION: ICD-10-CM

## 2025-01-07 DIAGNOSIS — Z95.2 HISTORY OF TRANSCATHETER AORTIC VALVE REPLACEMENT (TAVR): Primary | ICD-10-CM

## 2025-01-07 DIAGNOSIS — I25.10 CORONARY ARTERY DISEASE INVOLVING NATIVE CORONARY ARTERY OF NATIVE HEART WITHOUT ANGINA PECTORIS: ICD-10-CM

## 2025-01-07 PROCEDURE — 3077F SYST BP >= 140 MM HG: CPT | Performed by: NUCLEAR MEDICINE

## 2025-01-07 PROCEDURE — 1090F PRES/ABSN URINE INCON ASSESS: CPT | Performed by: NUCLEAR MEDICINE

## 2025-01-07 PROCEDURE — 3017F COLORECTAL CA SCREEN DOC REV: CPT | Performed by: NUCLEAR MEDICINE

## 2025-01-07 PROCEDURE — 1159F MED LIST DOCD IN RCRD: CPT | Performed by: NUCLEAR MEDICINE

## 2025-01-07 PROCEDURE — G8420 CALC BMI NORM PARAMETERS: HCPCS | Performed by: NUCLEAR MEDICINE

## 2025-01-07 PROCEDURE — G8427 DOCREV CUR MEDS BY ELIG CLIN: HCPCS | Performed by: NUCLEAR MEDICINE

## 2025-01-07 PROCEDURE — 99214 OFFICE O/P EST MOD 30 MIN: CPT | Performed by: NUCLEAR MEDICINE

## 2025-01-07 PROCEDURE — G8399 PT W/DXA RESULTS DOCUMENT: HCPCS | Performed by: NUCLEAR MEDICINE

## 2025-01-07 PROCEDURE — 3079F DIAST BP 80-89 MM HG: CPT | Performed by: NUCLEAR MEDICINE

## 2025-01-07 PROCEDURE — 1036F TOBACCO NON-USER: CPT | Performed by: NUCLEAR MEDICINE

## 2025-01-07 PROCEDURE — M1308 PR FLU IMMUNIZE NO ADMIN: HCPCS | Performed by: NUCLEAR MEDICINE

## 2025-01-07 PROCEDURE — 1123F ACP DISCUSS/DSCN MKR DOCD: CPT | Performed by: NUCLEAR MEDICINE

## 2025-01-07 RX ORDER — ATORVASTATIN CALCIUM 20 MG/1
20 TABLET, FILM COATED ORAL DAILY
COMMUNITY
End: 2025-01-07 | Stop reason: SDUPTHER

## 2025-01-07 RX ORDER — ATORVASTATIN CALCIUM 20 MG/1
20 TABLET, FILM COATED ORAL DAILY
Qty: 90 TABLET | Refills: 3 | Status: SHIPPED | OUTPATIENT
Start: 2025-01-07

## 2025-01-07 NOTE — PROGRESS NOTES
voicedunderstanding. Instructed to continue current medications, diet and exercise. Continue risk factor modification and medical management. Patient agreed with treatment plan. Follow up as directed.    Electronically signedby Cristian Gomez MD on 1/7/2025 at 11:56 AM

## 2025-03-05 ENCOUNTER — TELEPHONE (OUTPATIENT)
Dept: CARDIOLOGY CLINIC | Age: 70
End: 2025-03-05

## 2025-04-29 ENCOUNTER — OFFICE VISIT (OUTPATIENT)
Age: 70
End: 2025-04-29

## 2025-04-29 VITALS
SYSTOLIC BLOOD PRESSURE: 148 MMHG | WEIGHT: 147.25 LBS | DIASTOLIC BLOOD PRESSURE: 78 MMHG | BODY MASS INDEX: 24.53 KG/M2 | HEART RATE: 63 BPM | HEIGHT: 65 IN

## 2025-04-29 DIAGNOSIS — E21.3 HYPERPARATHYROIDISM: Primary | ICD-10-CM

## 2025-04-29 DIAGNOSIS — E55.9 HYPOVITAMINOSIS D: ICD-10-CM

## 2025-04-29 DIAGNOSIS — E83.52 HYPERCALCEMIA: ICD-10-CM

## 2025-04-29 DIAGNOSIS — E03.9 ACQUIRED HYPOTHYROIDISM: ICD-10-CM

## 2025-04-29 RX ORDER — NIFEDIPINE 60 MG/1
60 TABLET, EXTENDED RELEASE ORAL DAILY
COMMUNITY
Start: 2025-03-04

## 2025-04-29 RX ORDER — LEVOTHYROXINE SODIUM 88 UG/1
88 TABLET ORAL DAILY
COMMUNITY
Start: 2025-02-04

## 2025-04-29 NOTE — PROGRESS NOTES
[facility] [place] [facility]  [facility] ENDOCRINOLOGY  [address]  [address]  [place] [address]  Dept: [phone]  Loc: [phone]     Visit Date: 4/29/2025    Tenisha Solis is a 69 y.o. female who presents today for:  Chief Complaint   Patient presents with    Follow-up     Hyperparathyroidism              Subjective:      HPI     Tenisha Solis is a 69 y.o. , female who comes for Follow up for hyperparathyroidism and hypothyroidism.  Axel Evans APRN  Tenisha Solis is being seen for hyperparathyroidism associated with hypercalcemia. The patient has end-stage renal disease and is on hemodialysis.  She has received zoledronic acid in the past for hypercalcemia.  The patient is now taking Cinacalcet 60 mg daily.  We have previously discussed surgery with this patient and she declined it.  The patient follows closely with nephrologist.  Her vitamin D level has been low.  She is currently taking 5000 international units daily.  Please note that the patient is also being treated for hypothyroidism and she takes 88 mcg of Synthroid daily for 6 days and half tablet on the seventh day.  The patient tells me that she has had labs at the dialysis center and she thinks that the thyroid level was included.  I do not have access to those labs and we will request them for my review.  Past Medical History:   Diagnosis Date    Anxiety     Diarrhea     Dryness of periwound skin     Gout, joint     Hemodialysis patient     Hernia of abdominal cavity     Hypertension     Kidney disease     Mouth dryness       Past Surgical History:   Procedure Laterality Date    AV FISTULA CREATION Left     CARDIAC PROCEDURE Bilateral 06/06/2024    Left and right heart cath / coronary angiography performed by Cristian Gomez MD at [facility] CARDIAC CATH LAB    CARDIAC PROCEDURE N/A 07/29/2024    Transcatheter aortic valve replacement performed by Ha Kang MD at [facility] CARDIAC CATH LAB    EP

## 2025-07-29 ENCOUNTER — HOSPITAL ENCOUNTER (OUTPATIENT)
Age: 70
Discharge: HOME | End: 2025-07-31
Attending: INTERNAL MEDICINE
Payer: MEDICARE

## 2025-07-29 DIAGNOSIS — Z95.2 S/P TAVR (TRANSCATHETER AORTIC VALVE REPLACEMENT): ICD-10-CM

## 2025-07-29 LAB
ECHO AO ASC DIAM: 3.3 CM
ECHO AV AREA PEAK VELOCITY: 1.4 CM2
ECHO AV AREA VTI: 1.4 CM2
ECHO AV MEAN GRADIENT: 10 MMHG
ECHO AV MEAN VELOCITY: 1.5 M/S
ECHO AV PEAK GRADIENT: 18 MMHG
ECHO AV PEAK VELOCITY: 2.1 M/S
ECHO AV VELOCITY RATIO: 0.48
ECHO AV VTI: 42.8 CM
ECHO EST RA PRESSURE: 3 MMHG
ECHO LA AREA 2C: 15.4 CM2
ECHO LA AREA 4C: 19.6 CM2
ECHO LA DIAMETER: 3.5 CM
ECHO LA MAJOR AXIS: 6.3 CM
ECHO LA MINOR AXIS: 5.2 CM
ECHO LA VOL BP: 45 ML (ref 22–52)
ECHO LA VOL MOD A2C: 35 ML (ref 22–52)
ECHO LA VOL MOD A4C: 49 ML (ref 22–52)
ECHO LV E' LATERAL VELOCITY: 4.2 CM/S
ECHO LV EDV A2C: 69 ML
ECHO LV EDV A4C: 60 ML
ECHO LV EF PHYSICIAN: 55 %
ECHO LV EJECTION FRACTION A2C: 53 %
ECHO LV EJECTION FRACTION A4C: 51 %
ECHO LV EJECTION FRACTION BIPLANE: 50 % (ref 55–100)
ECHO LV ESV A2C: 33 ML
ECHO LV ESV A4C: 29 ML
ECHO LV FRACTIONAL SHORTENING: 25 % (ref 28–44)
ECHO LV INTERNAL DIMENSION DIASTOLIC: 5.2 CM (ref 3.9–5.3)
ECHO LV INTERNAL DIMENSION SYSTOLIC: 3.9 CM
ECHO LV ISOVOLUMETRIC RELAXATION TIME (IVRT): 99 MS
ECHO LV IVSD: 1.1 CM (ref 0.6–0.9)
ECHO LV MASS 2D: 194.2 G (ref 67–162)
ECHO LV POSTERIOR WALL DIASTOLIC: 0.9 CM (ref 0.6–0.9)
ECHO LV RELATIVE WALL THICKNESS RATIO: 0.35
ECHO LVOT AREA: 2.8 CM2
ECHO LVOT AV VTI INDEX: 0.49
ECHO LVOT DIAM: 1.9 CM
ECHO LVOT MEAN GRADIENT: 3 MMHG
ECHO LVOT PEAK GRADIENT: 4 MMHG
ECHO LVOT PEAK VELOCITY: 1 M/S
ECHO LVOT SV: 58.9 ML
ECHO LVOT VTI: 20.8 CM
ECHO MV A VELOCITY: 1.19 M/S
ECHO MV AREA VTI: 1.1 CM2
ECHO MV E DECELERATION TIME (DT): 416 MS
ECHO MV E VELOCITY: 1.08 M/S
ECHO MV E/A RATIO: 0.91
ECHO MV E/E' LATERAL: 25.71
ECHO MV LVOT VTI INDEX: 2.5
ECHO MV MAX VELOCITY: 1.4 M/S
ECHO MV MEAN GRADIENT: 4 MMHG
ECHO MV MEAN VELOCITY: 0.8 M/S
ECHO MV PEAK GRADIENT: 8 MMHG
ECHO MV REGURGITANT PEAK GRADIENT: 81 MMHG
ECHO MV REGURGITANT PEAK VELOCITY: 4.5 M/S
ECHO MV VTI: 52.1 CM
ECHO PULMONARY ARTERY END DIASTOLIC PRESSURE: 5 MMHG
ECHO PV MAX VELOCITY: 0.5 M/S
ECHO PV PEAK GRADIENT: 1 MMHG
ECHO PV REGURGITANT MAX VELOCITY: 1.1 M/S
ECHO RIGHT VENTRICULAR SYSTOLIC PRESSURE (RVSP): 32 MMHG
ECHO RV INTERNAL DIMENSION: 3.7 CM
ECHO RV TAPSE: 1.7 CM (ref 1.7–?)
ECHO TV E WAVE: 0.4 M/S
ECHO TV REGURGITANT MAX VELOCITY: 2.68 M/S
ECHO TV REGURGITANT PEAK GRADIENT: 29 MMHG

## 2025-07-29 PROCEDURE — 93306 TTE W/DOPPLER COMPLETE: CPT | Performed by: INTERNAL MEDICINE

## 2025-07-29 PROCEDURE — 93306 TTE W/DOPPLER COMPLETE: CPT

## 2025-08-12 ENCOUNTER — CLINICAL SUPPORT (OUTPATIENT)
Dept: CARDIOLOGY CLINIC | Age: 70
End: 2025-08-12

## 2025-08-12 ENCOUNTER — TELEPHONE (OUTPATIENT)
Dept: CARDIOLOGY CLINIC | Age: 70
End: 2025-08-12

## 2025-08-12 DIAGNOSIS — Z95.0 PACEMAKER: Primary | ICD-10-CM

## (undated) DEVICE — PATTERSON TOTAL JOINT: Brand: MEDLINE INDUSTRIES, INC.

## (undated) DEVICE — GUIDEWIRE VASC L150CM DIA0.035IN FLX TIP L7CM PTFE STR FIX

## (undated) DEVICE — DRAPE KIT RAMAPR RADIATION SHIELD

## (undated) DEVICE — MEDTRONIC JL4.0 DIAGNOSTIC CATHETER

## (undated) DEVICE — DRESSING QUIKCLOT FEMORAL INTERVENTIONAL 1.5X1.5 IN

## (undated) DEVICE — CLOSURE SKIN FLX NONINVASIVE PRELOC TECHNOLOGY FOR 24IN

## (undated) DEVICE — BASIC SINGLE BASIN BTC-LF: Brand: MEDLINE INDUSTRIES, INC.

## (undated) DEVICE — SWAN-GANZ DOUBLE LUMEN MONITORING CATHETER: Brand: SWAN-GANZ

## (undated) DEVICE — SUTURE VCRL SZ 1 L36IN ABSRB UD CTX L48MM 1/2 CIR J977H

## (undated) DEVICE — DRESSING TRNSPAR W8XL12IN FLM SURESITE 123

## (undated) DEVICE — PINNACLE INTRODUCER SHEATH: Brand: PINNACLE

## (undated) DEVICE — ANGIO-SEAL VIP VASCULAR CLOSURE DEVICE: Brand: ANGIO-SEAL

## (undated) DEVICE — SOLUTION IRRIG 3000ML 0.9% SOD CHL USP UROMATIC PLAS CONT

## (undated) DEVICE — CARDIAC CATH LAB PACK LF

## (undated) DEVICE — DUAL CUT SAGITTAL BLADE

## (undated) DEVICE — ELECTRODE BLDE L4IN NONINSULATED EDGE

## (undated) DEVICE — DILATOR: Brand: COOK

## (undated) DEVICE — TRUE™ DILATATION BALLOON VALVULOPLASTY CATHETER, 20 MM X 4.5 CM, 110 CM CATHETER: Brand: TRUE DILATATION

## (undated) DEVICE — TOWEL,OR,DSP,ST,BLUE,DLX,4/PK,20PK/CS: Brand: MEDLINE

## (undated) DEVICE — DILATOR WITH AQ, HYDROPHILIC COATING: Brand: COOK

## (undated) DEVICE — Device: Brand: MEDEX

## (undated) DEVICE — PAD,NON-ADHERENT,3X8,STERILE,LF,1/PK: Brand: MEDLINE

## (undated) DEVICE — PACK-MAJOR

## (undated) DEVICE — IMPREGNATED GAUZE DRESSING: Brand: CUTICERIN 7.5X7.5CM CTN 50

## (undated) DEVICE — SPONGE GZ W4XL4IN COT 12 PLY TYP VII WVN C FLD DSGN STERILE

## (undated) DEVICE — 260 CM J TIP WIRE .035

## (undated) DEVICE — AMPLATZ SUPER STIFF 0.35 X 180 WIRE

## (undated) DEVICE — CATHETER ANGIO (ORDER IN MULTIPLES OF 5 EACHS) PIG INFINITI 5FR 110CM 0.038IN STRAIGHT

## (undated) DEVICE — CHECK-FLO PERFORMER INTRODUCER: Brand: PERFORMER

## (undated) DEVICE — PAD HIP IMMOB

## (undated) DEVICE — GUIDEWIRE VASC 0.035 INX275 CM X SM CRV LUBRIGREEN SS SAFARI

## (undated) DEVICE — Device: Brand: NOMOLINE™ LH ADULT NASAL CO2 CANNULA WITH O2 4M

## (undated) DEVICE — DRAPE,HIP,W/POUCHES,STERILE: Brand: MEDLINE

## (undated) DEVICE — INTRODUCER SHTH W51XH73XL557MM D13MM DIA7.8MM HYDRPHLC

## (undated) DEVICE — 4F (1.0MM ID) X 9CM STIFF4F (1.0 MICRO-STICK®INTRODUCER SE WITH NITINOL GUIDEWIREWITH NITIN WITH RADIOPAQUE TIPWITH RADIOPAQ: Brand: MICRO-STICK SETMICRO-STICK SET

## (undated) DEVICE — Device

## (undated) DEVICE — ELECTRODE PACE S STL BPLR BLLN TEMP CATH

## (undated) DEVICE — PERCLOSE™ PROSTYLE™ SUTURE-MEDIATED CLOSURE AND REPAIR SYSTEM: Brand: PERCLOSE™ PROSTYLE™

## (undated) DEVICE — CATHETER ANGIO AMPLATZ LT 1 6 FRX100 CM INFIN

## (undated) DEVICE — GOWN,SIRUS,NON REINFRCD,LARGE,SET IN SL: Brand: MEDLINE

## (undated) DEVICE — GUIDEWIRE 25/260/FC/PTFE/3J: Brand: GUIDEWIRE

## (undated) DEVICE — TIBURON NEONATAL DRAPE: Brand: CONVERTORS

## (undated) DEVICE — SHEATH INTRO L12CM DIA6FR W/ LUERLOCK HUB HEMSTAS VLV

## (undated) DEVICE — SUTURE ABSORBABLE MONOFILAMENT 1 CTX 36 CM 48 MM VIO PDS +

## (undated) DEVICE — SUTURE VCRL + SZ 2-0 L27IN ABSRB UD CP-1 1/2 CIR REV CUT VCP266H

## (undated) DEVICE — COVER,TABLE,44X90,STERILE: Brand: MEDLINE

## (undated) DEVICE — DELIV SYS D-EVOLUTFX-2329: Brand: EVOLUT™ FX

## (undated) DEVICE — REFLECTION FLEXIBLE DRILL 25MM: Brand: REFLECTION

## (undated) DEVICE — CORDIS PIG 145 110CM CATHETER

## (undated) DEVICE — CATHETER 5FR 3DRC MEDTRONIC 100CM

## (undated) DEVICE — SUTURE PDS II SZ 1 L36IN ABSRB VLT L48MM CTX 1/2 CIR Z371T

## (undated) DEVICE — SOLUTION IV 100ML 0.9% SOD CHL PLAS CONT USP VIAFLX 1 PER

## (undated) DEVICE — PAD, DEFIB, ADULT, RADIOTRANS, PHYSIO: Brand: MEDLINE

## (undated) DEVICE — 150CM STANDARD JWIRE

## (undated) DEVICE — DECANTER BAG 9": Brand: MEDLINE INDUSTRIES, INC.

## (undated) DEVICE — GLOVE SURG SZ 7.5 L11.73IN FNGR THK9.8MIL STRW LTX POLYMER

## (undated) DEVICE — LOADING SYS L-EVOLUTFX-2329: Brand: EVOLUT™ FX

## (undated) DEVICE — KIT ANGIO W/ AT P65 PREM HND CTRL FOR CNTRST DEL ANGIOTOUCH

## (undated) DEVICE — HI-TORQUE SUPRA CORE .035 PERIPHERAL GUIDE WIRE .035 X 190 CM: Brand: HI-TORQUE SUPRA CORE